# Patient Record
Sex: FEMALE | Race: WHITE | NOT HISPANIC OR LATINO | Employment: OTHER | URBAN - METROPOLITAN AREA
[De-identification: names, ages, dates, MRNs, and addresses within clinical notes are randomized per-mention and may not be internally consistent; named-entity substitution may affect disease eponyms.]

---

## 2017-01-24 RX ORDER — AMLODIPINE BESYLATE 10 MG/1
TABLET ORAL
Qty: 30 TABLET | Refills: 0 | Status: SHIPPED | OUTPATIENT
Start: 2017-01-24 | End: 2017-02-20 | Stop reason: SDUPTHER

## 2017-01-24 RX ORDER — LISINOPRIL 40 MG/1
TABLET ORAL
Qty: 30 TABLET | Refills: 0 | Status: SHIPPED | OUTPATIENT
Start: 2017-01-24 | End: 2017-02-20 | Stop reason: SDUPTHER

## 2017-02-20 RX ORDER — LISINOPRIL 40 MG/1
TABLET ORAL
Qty: 30 TABLET | Refills: 0 | Status: SHIPPED | OUTPATIENT
Start: 2017-02-20 | End: 2017-03-20 | Stop reason: SDUPTHER

## 2017-02-20 RX ORDER — AMLODIPINE BESYLATE 10 MG/1
TABLET ORAL
Qty: 30 TABLET | Refills: 0 | Status: SHIPPED | OUTPATIENT
Start: 2017-02-20 | End: 2017-03-07

## 2017-03-07 ENCOUNTER — OFFICE VISIT (OUTPATIENT)
Dept: FAMILY MEDICINE CLINIC | Facility: CLINIC | Age: 67
End: 2017-03-07

## 2017-03-07 VITALS
RESPIRATION RATE: 16 BRPM | DIASTOLIC BLOOD PRESSURE: 80 MMHG | HEART RATE: 85 BPM | SYSTOLIC BLOOD PRESSURE: 124 MMHG | TEMPERATURE: 97.7 F | BODY MASS INDEX: 28.93 KG/M2 | OXYGEN SATURATION: 98 % | WEIGHT: 180 LBS | HEIGHT: 66 IN

## 2017-03-07 DIAGNOSIS — R39.9 UTI SYMPTOMS: Primary | ICD-10-CM

## 2017-03-07 DIAGNOSIS — R10.9 ACUTE RIGHT FLANK PAIN: ICD-10-CM

## 2017-03-07 LAB
BILIRUB BLD-MCNC: NEGATIVE MG/DL
CLARITY, POC: CLEAR
COLOR UR: ABNORMAL
GLUCOSE UR STRIP-MCNC: NEGATIVE MG/DL
KETONES UR QL: NEGATIVE
LEUKOCYTE EST, POC: ABNORMAL
NITRITE UR-MCNC: NEGATIVE MG/ML
PH UR: 6 [PH] (ref 5–8)
PROT UR STRIP-MCNC: NEGATIVE MG/DL
RBC # UR STRIP: ABNORMAL /UL
SP GR UR: 1.02 (ref 1–1.03)
UROBILINOGEN UR QL: NORMAL

## 2017-03-07 PROCEDURE — 81003 URINALYSIS AUTO W/O SCOPE: CPT | Performed by: PHYSICIAN ASSISTANT

## 2017-03-07 PROCEDURE — 99213 OFFICE O/P EST LOW 20 MIN: CPT | Performed by: PHYSICIAN ASSISTANT

## 2017-03-07 RX ORDER — AMOXICILLIN AND CLAVULANATE POTASSIUM 875; 125 MG/1; MG/1
1 TABLET, FILM COATED ORAL 2 TIMES DAILY
Qty: 20 TABLET | Refills: 0 | Status: SHIPPED | OUTPATIENT
Start: 2017-03-07 | End: 2017-12-22

## 2017-03-07 NOTE — PROGRESS NOTES
Subjective   Leonard Best is a 66 y.o. female.     History of Present Illness   Leonard Best 66 y.o.female complains of urinary symptoms. she complains of back pain. She has had symptoms for 10 days. The symptoms are mild.  Patient denies fever, gross blood in urine, urgency, frequency, dysuria, nausea, vomiting and abdominal pain.  Patient has tried  Macrobid for relief of symptoms.  Patient does not have a history of recurrent UTI except when pregnant. Patient does not have a history of pyelonephritis.  She is improved but still there  Has one kidney that is smaller     Went to [facility] and no cx  2-28-17 had + nitrates, leuk, neg blood      The following portions of the patient's history were reviewed and updated as appropriate: allergies, current medications, past family history, past medical history, past social history, past surgical history and problem list.    Review of Systems   Constitutional: Negative for activity change, appetite change and unexpected weight change.   HENT: Negative for nosebleeds and trouble swallowing.    Eyes: Negative for pain and visual disturbance.   Respiratory: Negative for chest tightness, shortness of breath and wheezing.    Cardiovascular: Negative for chest pain and palpitations.   Gastrointestinal: Negative for abdominal pain and blood in stool.   Endocrine: Negative.    Genitourinary: Negative for difficulty urinating and hematuria.   Musculoskeletal: Positive for back pain. Negative for joint swelling.   Skin: Negative for color change and rash.   Allergic/Immunologic: Negative.    Neurological: Negative for syncope and speech difficulty.   Hematological: Negative for adenopathy.   Psychiatric/Behavioral: Negative for agitation and confusion.   All other systems reviewed and are negative.      Objective   Physical Exam   Constitutional: She is oriented to person, place, and time. She appears well-developed and well-nourished. No distress.   HENT:   Head: Normocephalic and  atraumatic.   Eyes: Conjunctivae and EOM are normal.   Neck: Neck supple.   Pulmonary/Chest: Effort normal.   Abdominal: Soft. Bowel sounds are normal. She exhibits no distension and no mass. There is no tenderness. There is no rebound and no guarding.   Musculoskeletal: Normal range of motion. She exhibits tenderness (mildly just below right flank).   No ROM pain   Neurological: She is alert and oriented to person, place, and time. She has normal reflexes.   Skin: Skin is warm and dry. No rash noted. She is not diaphoretic.   Psychiatric: She has a normal mood and affect. Her behavior is normal. Judgment and thought content normal.   Nursing note and vitals reviewed.      Assessment/Plan   Problems Addressed this Visit     None      Visit Diagnoses     UTI symptoms    -  Primary    Relevant Medications    amoxicillin-clavulanate (AUGMENTIN) 875-125 MG per tablet    Other Relevant Orders    POC Urinalysis Dipstick, Automated (Completed)    Urine Culture    Urinalysis With Microscopic    Acute right flank pain

## 2017-03-07 NOTE — PATIENT INSTRUCTIONS
ER if fever; sepsis    Urinary Tract Infection  Urinary tract infections (UTIs) can develop anywhere along your urinary tract. Your urinary tract is your body's drainage system for removing wastes and extra water. Your urinary tract includes two kidneys, two ureters, a bladder, and a urethra. Your kidneys are a pair of bean-shaped organs. Each kidney is about the size of your fist. They are located below your ribs, one on each side of your spine.  CAUSES  Infections are caused by microbes, which are microscopic organisms, including fungi, viruses, and bacteria. These organisms are so small that they can only be seen through a microscope. Bacteria are the microbes that most commonly cause UTIs.  SYMPTOMS   Symptoms of UTIs may vary by age and gender of the patient and by the location of the infection. Symptoms in young women typically include a frequent and intense urge to urinate and a painful, burning feeling in the bladder or urethra during urination. Older women and men are more likely to be tired, shaky, and weak and have muscle aches and abdominal pain. A fever may mean the infection is in your kidneys. Other symptoms of a kidney infection include pain in your back or sides below the ribs, nausea, and vomiting.  DIAGNOSIS  To diagnose a UTI, your caregiver will ask you about your symptoms. Your caregiver will also ask you to provide a urine sample. The urine sample will be tested for bacteria and white blood cells. White blood cells are made by your body to help fight infection.  TREATMENT   Typically, UTIs can be treated with medication. Because most UTIs are caused by a bacterial infection, they usually can be treated with the use of antibiotics. The choice of antibiotic and length of treatment depend on your symptoms and the type of bacteria causing your infection.  HOME CARE INSTRUCTIONS  · If you were prescribed antibiotics, take them exactly as your caregiver instructs you. Finish the medication even if  you feel better after you have only taken some of the medication.  · Drink enough water and fluids to keep your urine clear or pale yellow.  · Avoid caffeine, tea, and carbonated beverages. They tend to irritate your bladder.  · Empty your bladder often. Avoid holding urine for long periods of time.  · Empty your bladder before and after sexual intercourse.  · After a bowel movement, women should cleanse from front to back. Use each tissue only once.  SEEK MEDICAL CARE IF:   · You have back pain.  · You develop a fever.  · Your symptoms do not begin to resolve within 3 days.  SEEK IMMEDIATE MEDICAL CARE IF:   · You have severe back pain or lower abdominal pain.  · You develop chills.  · You have nausea or vomiting.  · You have continued burning or discomfort with urination.  MAKE SURE YOU:   · Understand these instructions.  · Will watch your condition.  · Will get help right away if you are not doing well or get worse.     This information is not intended to replace advice given to you by your health care provider. Make sure you discuss any questions you have with your health care provider.     Document Released: 09/27/2006 Document Revised: 09/07/2016 Document Reviewed: 11/07/2016  [employer] Interactive Patient Education ©2016 [employer]    Considering urology consult

## 2017-03-10 LAB
APPEARANCE UR: (no result)
BACTERIA #/AREA URNS HPF: ABNORMAL /[HPF]
BACTERIA UR CULT: ABNORMAL
BACTERIA UR CULT: ABNORMAL
BILIRUB UR QL STRIP: NEGATIVE
COLOR UR: YELLOW
CRYSTALS URNS MICRO: ABNORMAL
EPI CELLS #/AREA URNS HPF: ABNORMAL /HPF
GLUCOSE UR QL: NEGATIVE
HGB UR QL STRIP: NEGATIVE
KETONES UR QL STRIP: NEGATIVE
LEUKOCYTE ESTERASE UR QL STRIP: (no result)
MICRO URNS: (no result)
MUCOUS THREADS URNS QL MICRO: PRESENT
NITRITE UR QL STRIP: NEGATIVE
OTHER ANTIBIOTIC SUSC ISLT: ABNORMAL
PH UR STRIP: 6 [PH] (ref 5–7.5)
PROT UR QL STRIP: NEGATIVE
RBC #/AREA URNS HPF: ABNORMAL /HPF
SP GR UR: 1.01 (ref 1–1.03)
UNIDENT CRYS URNS QL MICRO: PRESENT
UROBILINOGEN UR STRIP-MCNC: 0.2 MG/DL (ref 0.2–1)
WBC #/AREA URNS HPF: ABNORMAL /HPF

## 2017-03-20 RX ORDER — AMLODIPINE BESYLATE 10 MG/1
TABLET ORAL
Qty: 30 TABLET | Refills: 0 | Status: SHIPPED | OUTPATIENT
Start: 2017-03-20 | End: 2017-04-20 | Stop reason: SDUPTHER

## 2017-03-20 RX ORDER — LISINOPRIL 40 MG/1
TABLET ORAL
Qty: 30 TABLET | Refills: 0 | Status: SHIPPED | OUTPATIENT
Start: 2017-03-20 | End: 2017-04-20 | Stop reason: SDUPTHER

## 2017-04-20 RX ORDER — LISINOPRIL 40 MG/1
TABLET ORAL
Qty: 30 TABLET | Refills: 0 | Status: SHIPPED | OUTPATIENT
Start: 2017-04-20 | End: 2017-05-18 | Stop reason: SDUPTHER

## 2017-04-20 RX ORDER — AMLODIPINE BESYLATE 10 MG/1
TABLET ORAL
Qty: 30 TABLET | Refills: 0 | Status: SHIPPED | OUTPATIENT
Start: 2017-04-20 | End: 2017-05-18 | Stop reason: SDUPTHER

## 2017-05-18 RX ORDER — LISINOPRIL 40 MG/1
TABLET ORAL
Qty: 30 TABLET | Refills: 0 | Status: SHIPPED | OUTPATIENT
Start: 2017-05-18 | End: 2017-06-14 | Stop reason: SDUPTHER

## 2017-05-18 RX ORDER — AMLODIPINE BESYLATE 10 MG/1
TABLET ORAL
Qty: 30 TABLET | Refills: 0 | Status: SHIPPED | OUTPATIENT
Start: 2017-05-18 | End: 2017-06-14 | Stop reason: SDUPTHER

## 2017-06-14 RX ORDER — AMLODIPINE BESYLATE 10 MG/1
TABLET ORAL
Qty: 30 TABLET | Refills: 0 | Status: SHIPPED | OUTPATIENT
Start: 2017-06-14 | End: 2017-07-18 | Stop reason: SDUPTHER

## 2017-06-14 RX ORDER — LISINOPRIL 40 MG/1
TABLET ORAL
Qty: 30 TABLET | Refills: 0 | Status: SHIPPED | OUTPATIENT
Start: 2017-06-14 | End: 2017-07-18 | Stop reason: SDUPTHER

## 2017-07-18 RX ORDER — LISINOPRIL 40 MG/1
TABLET ORAL
Qty: 30 TABLET | Refills: 0 | Status: SHIPPED | OUTPATIENT
Start: 2017-07-18 | End: 2017-08-14 | Stop reason: SDUPTHER

## 2017-07-18 RX ORDER — AMLODIPINE BESYLATE 10 MG/1
TABLET ORAL
Qty: 30 TABLET | Refills: 0 | Status: SHIPPED | OUTPATIENT
Start: 2017-07-18 | End: 2017-08-14 | Stop reason: SDUPTHER

## 2017-08-14 RX ORDER — LISINOPRIL 40 MG/1
TABLET ORAL
Qty: 30 TABLET | Refills: 0 | Status: SHIPPED | OUTPATIENT
Start: 2017-08-14 | End: 2017-09-07 | Stop reason: SDUPTHER

## 2017-08-14 RX ORDER — AMLODIPINE BESYLATE 10 MG/1
TABLET ORAL
Qty: 30 TABLET | Refills: 0 | Status: SHIPPED | OUTPATIENT
Start: 2017-08-14 | End: 2017-09-07 | Stop reason: SDUPTHER

## 2017-09-07 RX ORDER — LISINOPRIL 40 MG/1
TABLET ORAL
Qty: 30 TABLET | Refills: 0 | Status: SHIPPED | OUTPATIENT
Start: 2017-09-07 | End: 2017-10-05 | Stop reason: SDUPTHER

## 2017-09-07 RX ORDER — AMLODIPINE BESYLATE 10 MG/1
TABLET ORAL
Qty: 30 TABLET | Refills: 0 | Status: SHIPPED | OUTPATIENT
Start: 2017-09-07 | End: 2017-10-05 | Stop reason: SDUPTHER

## 2017-10-05 RX ORDER — LISINOPRIL 40 MG/1
TABLET ORAL
Qty: 30 TABLET | Refills: 0 | Status: SHIPPED | OUTPATIENT
Start: 2017-10-05 | End: 2017-11-05 | Stop reason: SDUPTHER

## 2017-10-05 RX ORDER — AMLODIPINE BESYLATE 10 MG/1
TABLET ORAL
Qty: 30 TABLET | Refills: 0 | Status: SHIPPED | OUTPATIENT
Start: 2017-10-05 | End: 2017-11-05 | Stop reason: SDUPTHER

## 2017-11-05 RX ORDER — AMLODIPINE BESYLATE 10 MG/1
TABLET ORAL
Qty: 30 TABLET | Refills: 0 | Status: SHIPPED | OUTPATIENT
Start: 2017-11-05 | End: 2017-12-06 | Stop reason: SDUPTHER

## 2017-11-05 RX ORDER — LISINOPRIL 40 MG/1
TABLET ORAL
Qty: 30 TABLET | Refills: 0 | Status: SHIPPED | OUTPATIENT
Start: 2017-11-05 | End: 2017-12-06 | Stop reason: SDUPTHER

## 2017-12-06 RX ORDER — AMLODIPINE BESYLATE 10 MG/1
TABLET ORAL
Qty: 15 TABLET | Refills: 0 | Status: SHIPPED | OUTPATIENT
Start: 2017-12-06 | End: 2018-01-09 | Stop reason: ALTCHOICE

## 2017-12-06 RX ORDER — LISINOPRIL 40 MG/1
TABLET ORAL
Qty: 15 TABLET | Refills: 0 | Status: SHIPPED | OUTPATIENT
Start: 2017-12-06 | End: 2017-12-26 | Stop reason: SDUPTHER

## 2017-12-22 ENCOUNTER — OFFICE VISIT (OUTPATIENT)
Dept: FAMILY MEDICINE CLINIC | Facility: CLINIC | Age: 67
End: 2017-12-22

## 2017-12-22 ENCOUNTER — APPOINTMENT (OUTPATIENT)
Dept: GENERAL RADIOLOGY | Facility: HOSPITAL | Age: 67
End: 2017-12-22

## 2017-12-22 ENCOUNTER — HOSPITAL ENCOUNTER (EMERGENCY)
Facility: HOSPITAL | Age: 67
Discharge: HOME | End: 2017-12-22
Attending: FAMILY MEDICINE | Admitting: FAMILY MEDICINE

## 2017-12-22 VITALS
SYSTOLIC BLOOD PRESSURE: 130 MMHG | BODY MASS INDEX: 31.34 KG/M2 | HEART RATE: 76 BPM | OXYGEN SATURATION: 98 % | HEIGHT: 66 IN | WEIGHT: 195 LBS | RESPIRATION RATE: 18 BRPM | DIASTOLIC BLOOD PRESSURE: 75 MMHG | TEMPERATURE: 98.2 F

## 2017-12-22 VITALS
OXYGEN SATURATION: 95 % | WEIGHT: 184 LBS | HEIGHT: 66 IN | BODY MASS INDEX: 29.57 KG/M2 | HEART RATE: 156 BPM | DIASTOLIC BLOOD PRESSURE: 88 MMHG | RESPIRATION RATE: 16 BRPM | SYSTOLIC BLOOD PRESSURE: 110 MMHG | TEMPERATURE: 97.8 F

## 2017-12-22 DIAGNOSIS — I10 ESSENTIAL HYPERTENSION: ICD-10-CM

## 2017-12-22 DIAGNOSIS — E78.2 MIXED HYPERLIPIDEMIA: ICD-10-CM

## 2017-12-22 DIAGNOSIS — I47.1 SVT (SUPRAVENTRICULAR TACHYCARDIA) (HCC): Primary | ICD-10-CM

## 2017-12-22 DIAGNOSIS — I47.1 SUPRAVENTRICULAR TACHYCARDIA (HCC): Primary | ICD-10-CM

## 2017-12-22 LAB
ALBUMIN SERPL-MCNC: 3.8 G/DL (ref 3.5–5.2)
ALBUMIN/GLOB SERPL: 1.1 G/DL
ALP SERPL-CCNC: 127 U/L (ref 39–117)
ALT SERPL W P-5'-P-CCNC: 16 U/L (ref 1–33)
ANION GAP SERPL CALCULATED.3IONS-SCNC: 13.6 MMOL/L
AST SERPL-CCNC: 20 U/L (ref 1–32)
BASOPHILS # BLD AUTO: 0.1 10*3/MM3 (ref 0–0.2)
BASOPHILS NFR BLD AUTO: 1.2 % (ref 0–1.5)
BILIRUB SERPL-MCNC: 0.3 MG/DL (ref 0.1–1.2)
BUN BLD-MCNC: 15 MG/DL (ref 8–23)
BUN/CREAT SERPL: 18.1 (ref 7–25)
CALCIUM SPEC-SCNC: 10.2 MG/DL (ref 8.6–10.5)
CHLORIDE SERPL-SCNC: 103 MMOL/L (ref 98–107)
CO2 SERPL-SCNC: 22.4 MMOL/L (ref 22–29)
CREAT BLD-MCNC: 0.83 MG/DL (ref 0.57–1)
DEPRECATED RDW RBC AUTO: 47 FL (ref 37–54)
EOSINOPHIL # BLD AUTO: 0.48 10*3/MM3 (ref 0–0.7)
EOSINOPHIL NFR BLD AUTO: 5.8 % (ref 0.3–6.2)
ERYTHROCYTE [DISTWIDTH] IN BLOOD BY AUTOMATED COUNT: 14 % (ref 11.7–13)
GFR SERPL CREATININE-BSD FRML MDRD: 69 ML/MIN/1.73
GLOBULIN UR ELPH-MCNC: 3.6 GM/DL
GLUCOSE BLD-MCNC: 101 MG/DL (ref 65–99)
HCT VFR BLD AUTO: 41.5 % (ref 35.6–45.5)
HGB BLD-MCNC: 13.3 G/DL (ref 11.9–15.5)
HOLD SPECIMEN: NORMAL
HOLD SPECIMEN: NORMAL
IMM GRANULOCYTES # BLD: 0.02 10*3/MM3 (ref 0–0.03)
IMM GRANULOCYTES NFR BLD: 0.2 % (ref 0–0.5)
LYMPHOCYTES # BLD AUTO: 2.35 10*3/MM3 (ref 0.9–4.8)
LYMPHOCYTES NFR BLD AUTO: 28.3 % (ref 19.6–45.3)
MAGNESIUM SERPL-MCNC: 2 MG/DL (ref 1.6–2.4)
MCH RBC QN AUTO: 29.4 PG (ref 26.9–32)
MCHC RBC AUTO-ENTMCNC: 32 G/DL (ref 32.4–36.3)
MCV RBC AUTO: 91.6 FL (ref 80.5–98.2)
MONOCYTES # BLD AUTO: 0.49 10*3/MM3 (ref 0.2–1.2)
MONOCYTES NFR BLD AUTO: 5.9 % (ref 5–12)
NEUTROPHILS # BLD AUTO: 4.86 10*3/MM3 (ref 1.9–8.1)
NEUTROPHILS NFR BLD AUTO: 58.6 % (ref 42.7–76)
PLATELET # BLD AUTO: 298 10*3/MM3 (ref 140–500)
PMV BLD AUTO: 10.5 FL (ref 6–12)
POTASSIUM BLD-SCNC: 4.2 MMOL/L (ref 3.5–5.2)
PROT SERPL-MCNC: 7.4 G/DL (ref 6–8.5)
RBC # BLD AUTO: 4.53 10*6/MM3 (ref 3.9–5.2)
SODIUM BLD-SCNC: 139 MMOL/L (ref 136–145)
TROPONIN T SERPL-MCNC: <0.01 NG/ML (ref 0–0.03)
TSH SERPL DL<=0.05 MIU/L-ACNC: 0.64 MIU/ML (ref 0.27–4.2)
WBC NRBC COR # BLD: 8.3 10*3/MM3 (ref 4.5–10.7)
WHOLE BLOOD HOLD SPECIMEN: NORMAL
WHOLE BLOOD HOLD SPECIMEN: NORMAL

## 2017-12-22 PROCEDURE — 84484 ASSAY OF TROPONIN QUANT: CPT | Performed by: FAMILY MEDICINE

## 2017-12-22 PROCEDURE — 83735 ASSAY OF MAGNESIUM: CPT | Performed by: FAMILY MEDICINE

## 2017-12-22 PROCEDURE — 84443 ASSAY THYROID STIM HORMONE: CPT | Performed by: FAMILY MEDICINE

## 2017-12-22 PROCEDURE — 93005 ELECTROCARDIOGRAM TRACING: CPT | Performed by: FAMILY MEDICINE

## 2017-12-22 PROCEDURE — 93000 ELECTROCARDIOGRAM COMPLETE: CPT | Performed by: PHYSICIAN ASSISTANT

## 2017-12-22 PROCEDURE — 99214 OFFICE O/P EST MOD 30 MIN: CPT | Performed by: PHYSICIAN ASSISTANT

## 2017-12-22 PROCEDURE — 93010 ELECTROCARDIOGRAM REPORT: CPT | Performed by: INTERNAL MEDICINE

## 2017-12-22 PROCEDURE — 99284 EMERGENCY DEPT VISIT MOD MDM: CPT

## 2017-12-22 PROCEDURE — 85025 COMPLETE CBC W/AUTO DIFF WBC: CPT | Performed by: FAMILY MEDICINE

## 2017-12-22 PROCEDURE — 80053 COMPREHEN METABOLIC PANEL: CPT | Performed by: FAMILY MEDICINE

## 2017-12-22 PROCEDURE — 71020 HC CHEST PA AND LATERAL: CPT

## 2017-12-22 RX ORDER — METOPROLOL SUCCINATE 25 MG/1
25 TABLET, EXTENDED RELEASE ORAL DAILY
Qty: 30 TABLET | Refills: 0 | Status: SHIPPED | OUTPATIENT
Start: 2017-12-22 | End: 2018-01-09 | Stop reason: SDUPTHER

## 2017-12-22 NOTE — PROGRESS NOTES
Procedure     ECG 12 Lead  Date/Time: 12/22/2017 10:03 AM  Performed by: NICOLE WAYNE  Authorized by: NICOLE WAYNE   Rhythm: ventricular tachycardia  Rate: tachycardic  Conduction: conduction normal  T Waves: T waves normal  QRS axis: normal  Clinical impression: abnormal ECG  Comments: EKG Interpretation Report    Heart rate:    150 beats/min, MA interval:  unable msec, QRS duration:  88 msec  QTu:288 msec, QTc:  455 msec

## 2017-12-22 NOTE — ED PROVIDER NOTES
" EMERGENCY DEPARTMENT ENCOUNTER    CHIEF COMPLAINT  Chief Complaint: Tachycardia  History given by: Pt  History limited by: Nothing  Room Number: 14/14  PMD: Felicia Gloria PA-C      HPI:  Pt is a 67 y.o. female with a hx of hypertension, vitamin D deficiency, and hyperlipidemia who presents complaining of tachycardia (155) onset PTA. She reports she was at her PCP's for a BP medication follow up PTA who suggested she report to the ED. She denies CP, SOA, dizziness, or any other symptoms at this time. She reports she has experienced similar episodes previously. She states her episodes like this usually resolve. She denies significant cardiac hx.    Duration:  PTA  Onset: sudden  Timing: constant  Location: heart  Radiation: none  Quality: \"155\"  Intensity/Severity: moderate  Progression: unchanged  Associated Symptoms: none  Aggravating Factors: none  Alleviating Factors: none  Previous Episodes: none  Treatment before arrival: Pt received no treatment PTA.    PAST MEDICAL HISTORY  Active Ambulatory Problems     Diagnosis Date Noted   • Hypertension 12/30/2015   • Right knee pain 12/30/2015   • Hyperlipidemia 07/11/2016   • Osteoarthritis, multiple sites 08/18/2010   • Primary osteoarthritis of right knee 08/15/2016     Resolved Ambulatory Problems     Diagnosis Date Noted   • No Resolved Ambulatory Problems     Past Medical History:   Diagnosis Date   • History of indigestion    • History of staph infection 1980   • History of transfusion    • Hyperlipidemia    • Hypertension    • Osteoarthritis of knee    • Osteoarthritis, multiple sites 08/18/2010   • Vitamin D deficiency        PAST SURGICAL HISTORY  Past Surgical History:   Procedure Laterality Date   • FRACTURE SURGERY      left wrist   • LAPAROSCOPIC TUBAL LIGATION     • VT TOTAL KNEE ARTHROPLASTY Right 8/15/2016    Procedure: RT TOTAL KNEE ARTHROPLASTY;  Surgeon: Marlon Wills MD;  Location: [facility];  Service: Orthopedics   • THYROID SURGERY  1986    " thyroid nodule removed--removed about a third of her thyroid   • TONSILLECTOMY     • WRIST SURGERY Left 03/2003       FAMILY HISTORY  Family History   Problem Relation Age of Onset   • Cirrhosis Father        SOCIAL HISTORY  Social History     Social History   • Marital status:      Spouse name: N/A   • Number of children: N/A   • Years of education: N/A     Occupational History   • Not on file.     Social History Main Topics   • Smoking status: Former Smoker     Packs/day: 1.00     Years: 20.00     Types: Cigarettes     Quit date: 03/2015   • Smokeless tobacco: Never Used   • Alcohol use 0.6 oz/week     1 Glasses of wine per week      Comment: OCCASIONAL   • Drug use: No   • Sexual activity: Defer     Other Topics Concern   • Not on file     Social History Narrative       ALLERGIES  Sulfa antibiotics; Ciprofloxacin; Levofloxacin; and Macrodantin [nitrofurantoin]    REVIEW OF SYSTEMS  Review of Systems   Constitutional: Negative for fever.   HENT: Negative for sore throat.    Eyes: Negative.    Respiratory: Negative for cough and shortness of breath.    Cardiovascular: Negative for chest pain.        Pt complains of tachycardia.   Gastrointestinal: Negative for abdominal pain, diarrhea and vomiting.   Genitourinary: Negative for dysuria.   Musculoskeletal: Negative for neck pain.   Skin: Negative for rash.   Allergic/Immunologic: Negative.    Neurological: Negative for weakness, numbness and headaches.   Hematological: Negative.    Psychiatric/Behavioral: Negative.    All other systems reviewed and are negative.      PHYSICAL EXAM  ED Triage Vitals   Temp Pulse Resp BP SpO2   -- -- -- -- --             Temp src Heart Rate Source Patient Position BP Location FiO2 (%)   -- -- -- -- --              Physical Exam   Constitutional: She is oriented to person, place, and time and well-developed, well-nourished, and in no distress. No distress.   HENT:   Head: Normocephalic and atraumatic.   Eyes: EOM are normal.  Pupils are equal, round, and reactive to light.   Neck: Normal range of motion. Neck supple.   Cardiovascular: Normal rate, regular rhythm and normal heart sounds.    Pulmonary/Chest: Effort normal and breath sounds normal. No respiratory distress.   Abdominal: Soft. There is no tenderness. There is no rebound and no guarding.   Musculoskeletal: Normal range of motion. She exhibits no edema.   Neurological: She is alert and oriented to person, place, and time. She has normal sensation and normal strength.   Skin: Skin is warm and dry. No rash noted.   Psychiatric: Mood and affect normal.   Nursing note and vitals reviewed.      LAB RESULTS  Lab Results (last 24 hours)     Procedure Component Value Units Date/Time    CBC & Differential [[id number]] Collected:  12/22/17 1226    Specimen:  Blood Updated:  12/22/17 1243    Narrative:       The following orders were created for panel order CBC & Differential.  Procedure                               Abnormality         Status                     ---------                               -----------         ------                     CBC Auto Differential[[id number]]        Abnormal            Final result                 Please view results for these tests on the individual orders.    Comprehensive Metabolic Panel [[id number]]  (Abnormal) Collected:  12/22/17 1226    Specimen:  Blood Updated:  12/22/17 1308     Glucose 101 (H) mg/dL      BUN 15 mg/dL      Creatinine 0.83 mg/dL      Sodium 139 mmol/L      Potassium 4.2 mmol/L      Chloride 103 mmol/L      CO2 22.4 mmol/L      Calcium 10.2 mg/dL      Total Protein 7.4 g/dL      Albumin 3.80 g/dL      ALT (SGPT) 16 U/L      AST (SGOT) 20 U/L      Alkaline Phosphatase 127 (H) U/L      Total Bilirubin 0.3 mg/dL      eGFR Non African Amer 69 mL/min/1.73      Globulin 3.6 gm/dL      A/G Ratio 1.1 g/dL      BUN/Creatinine Ratio 18.1     Anion Gap 13.6 mmol/L     Troponin [[id number]]  (Normal) Collected:  12/22/17 1226    Specimen:   Blood Updated:  12/22/17 1316     Troponin T <0.010 ng/mL     Narrative:       Troponin T Reference Ranges:  Less than 0.03 ng/mL:    Negative for AMI  0.03 to 0.09 ng/mL:      Indeterminant for AMI  Greater than 0.09 ng/mL: Positive for AMI    TSH [[id number]]  (Normal) Collected:  12/22/17 1226    Specimen:  Blood Updated:  12/22/17 1316     TSH 0.644 mIU/mL     Magnesium [[id number]]  (Normal) Collected:  12/22/17 1226    Specimen:  Blood Updated:  12/22/17 1308     Magnesium 2.0 mg/dL     CBC Auto Differential [[id number]]  (Abnormal) Collected:  12/22/17 1226    Specimen:  Blood Updated:  12/22/17 1243     WBC 8.30 10*3/mm3      RBC 4.53 10*6/mm3      Hemoglobin 13.3 g/dL      Hematocrit 41.5 %      MCV 91.6 fL      MCH 29.4 pg      MCHC 32.0 (L) g/dL      RDW 14.0 (H) %      RDW-SD 47.0 fl      MPV 10.5 fL      Platelets 298 10*3/mm3      Neutrophil % 58.6 %      Lymphocyte % 28.3 %      Monocyte % 5.9 %      Eosinophil % 5.8 %      Basophil % 1.2 %      Immature Grans % 0.2 %      Neutrophils, Absolute 4.86 10*3/mm3      Lymphocytes, Absolute 2.35 10*3/mm3      Monocytes, Absolute 0.49 10*3/mm3      Eosinophils, Absolute 0.48 10*3/mm3      Basophils, Absolute 0.10 10*3/mm3      Immature Grans, Absolute 0.02 10*3/mm3           I ordered the above labs and reviewed the results    RADIOLOGY  XR Chest 2 View   Final Result   No focal pulmonary consolidation. Follow-up as clinical   indications persist.       This report was finalized on 12/22/2017 1:11 PM by Dr. Greyson Gayle MD.               I ordered the above noted radiological studies. Interpreted by radiologist. Reviewed by me in PACS.       PROCEDURES  Procedures    EKG           EKG time: 1220  Rhythm/Rate: SVT, 152  P waves and KS: nml  QRS, axis: Q waves in lead 3   ST and T waves: Nonspecific ST Changes     Interpreted Contemporaneously by me, independently viewed  New rhythm compared to prior 6/14/2016    EKG           EKG time: 1226  Rhythm/Rate:  NSR. 88  P waves and OK: nml  QRS, axis: nml   ST and T waves: Nonspecific ST changes     Interpreted Contemporaneously by me, independently viewed  Unchanged compared to prior 6/14/2016      PROGRESS AND CONSULTS  ED Course     1233 Ordered CXR, TSH, CMP, Troponin, and Magnesium for further evaluation    1330 BP- 135/83 HR- 93 Temp- 98.2 °F (36.8 °C) (Oral) O2 sat- 97%. Rechecked the patient who is in NAD and is resting comfortably. Informed pt of unremarkable lab and imaging results including WBC, TSH, CXR, Troponin, and Magnesium.  She remains asymptomatic. Suggested pt follow up with PCP and Cardiology. Will discharge with metoprolol to replace amlodipine. Pt understands and agrees with the plan. All questions answered.    MEDICAL DECISION MAKING  Results were reviewed/discussed with the patient and they were also made aware of online access. Pt also made aware that some labs, such as cultures, will not be resulted during ER visit and follow up with PMD is necessary.     MDM  Number of Diagnoses or Management Options     Amount and/or Complexity of Data Reviewed  Clinical lab tests: ordered and reviewed (WBC - 8.30, TSH - .644, Magnesium - 2.0, Troponin - <.010)  Tests in the radiology section of CPT®: reviewed and ordered (CXR shows NAD.)  Tests in the medicine section of CPT®: ordered and reviewed (See EKG procedure note.)  Decide to obtain previous medical records or to obtain history from someone other than the patient: yes  Review and summarize past medical records: yes  Independent visualization of images, tracings, or specimens: yes    Patient Progress  Patient progress: stable         DIAGNOSIS  Final diagnoses:   SVT (supraventricular tachycardia)       DISPOSITION  DISCHARGE    Patient discharged in stable condition.    Reviewed implications of results, diagnosis, meds, responsibility to follow up, warning signs and symptoms of possible worsening, potential complications and reasons to return to  ER.    Patient/Family voiced understanding of above instructions.    Discussed plan for discharge, as there is no emergent indication for admission.  Pt/family is agreeable and understands need for follow up and repeat testing.  Pt is aware that discharge does not mean that nothing is wrong but it indicates no emergency is present that requires admission and they must continue care with follow-up as given below or physician of their choice.     FOLLOW-UP  Mackenzie Abreu MD  [address] [facility] [address]  [address]  [place] [address]  [phone]          Felicia Gloria PA-C  [address] [facility]  [id number]  [facility] [address]  [phone]      Call your MD for a recheck next week of your blood pressure.  We may need to adjust what you are on.         Medication List      New Prescriptions          metoprolol succinate XL 25 MG 24 hr tablet   Commonly known as:  TOPROL-XL   Take 1 tablet by mouth Daily.         Stop          amLODIPine 10 MG tablet   Commonly known as:  NORVASC               Latest Documented Vital Signs:  As of 1:29 PM  BP- 135/83 HR- 93 Temp- 98.2 °F (36.8 °C) (Oral) O2 sat- 97%    --  Documentation assistance provided by haven Trivedi for Dr. Mosqueda.  Information recorded by the scribe was done at my direction and has been verified and validated by me.     Raoul Trivedi  12/22/17 [id number]       Cheko Mosqueda MD  12/22/17 [id number]

## 2017-12-22 NOTE — PROGRESS NOTES
"Subjective   Leonard Best is a 67 y.o. female.     History of Present Illness   Leonard Best 67 y.o. female who presents today for med refill and heart rate is over 150 bpm.  She feels \"like her insides are shaking\".  She woke up this way and has had it in past.  Did EKG and is in SVT. I personally discussed this patient's care with Dr. Garcia.  We reviewed the patient history, exam findings, and plan.  He did approve this plan of care.    Send out by EMS now  EMS here and patient on way  NAD and no SOA    she has a history of   Patient Active Problem List   Diagnosis   • Hypertension   • Right knee pain   • Hyperlipidemia   • Osteoarthritis, multiple sites   • Primary osteoarthritis of right knee   .        The following portions of the patient's history were reviewed and updated as appropriate: allergies, current medications, past family history, past medical history, past social history, past surgical history and problem list.    Review of Systems   Constitutional: Negative for activity change, appetite change and unexpected weight change.   HENT: Negative for nosebleeds and trouble swallowing.    Eyes: Negative for pain and visual disturbance.   Respiratory: Negative for chest tightness, shortness of breath and wheezing.    Cardiovascular: Positive for palpitations. Negative for chest pain.   Gastrointestinal: Negative for abdominal pain and blood in stool.   Endocrine: Negative.    Genitourinary: Negative for difficulty urinating and hematuria.   Musculoskeletal: Positive for arthralgias. Negative for joint swelling.   Skin: Negative for color change and rash.   Allergic/Immunologic: Negative.    Neurological: Negative for syncope and speech difficulty.   Hematological: Negative for adenopathy.   Psychiatric/Behavioral: Negative for agitation and confusion.   All other systems reviewed and are negative.      Objective   Physical Exam   Constitutional: She is oriented to person, place, and time. She " appears well-developed and well-nourished. No distress (no SOA).   HENT:   Head: Normocephalic and atraumatic.   Eyes: Conjunctivae and EOM are normal. Pupils are equal, round, and reactive to light. Right eye exhibits no discharge. Left eye exhibits no discharge. No scleral icterus.   Neck: Normal range of motion. Neck supple. No tracheal deviation present. No thyromegaly present.   Cardiovascular: Regular rhythm, intact distal pulses and normal pulses.  Exam reveals gallop.    No murmur heard.  Very fast heart rate but regular   Pulmonary/Chest: Effort normal and breath sounds normal. No respiratory distress. She has no wheezes. She has no rales.   Musculoskeletal: Normal range of motion.   Neurological: She is alert and oriented to person, place, and time. She exhibits normal muscle tone. Coordination normal.   Skin: Skin is warm. No rash noted. No erythema. No pallor.   Psychiatric: She has a normal mood and affect. Her behavior is normal. Judgment and thought content normal.   Nursing note and vitals reviewed.      Assessment/Plan   Leonard was seen today for hypertension.    Diagnoses and all orders for this visit:    Supraventricular tachycardia  -     ECG 12 Lead

## 2017-12-26 RX ORDER — LISINOPRIL 40 MG/1
40 TABLET ORAL DAILY
Qty: 30 TABLET | Refills: 0 | Status: SHIPPED | OUTPATIENT
Start: 2017-12-26 | End: 2018-01-09 | Stop reason: SDUPTHER

## 2017-12-27 ENCOUNTER — TELEPHONE (OUTPATIENT)
Dept: SOCIAL WORK | Facility: HOSPITAL | Age: 67
End: 2017-12-27

## 2017-12-27 NOTE — TELEPHONE ENCOUNTER
F/u phone call; spoke w/patient who reports feeling better, no further episodes; filled RX and taking as prescribed; Scheduled a f/u appt next week w/cardiology. No further concerns at this time. Chaya Pereyra RN

## 2018-01-09 ENCOUNTER — OFFICE VISIT (OUTPATIENT)
Dept: FAMILY MEDICINE CLINIC | Facility: CLINIC | Age: 68
End: 2018-01-09

## 2018-01-09 VITALS
SYSTOLIC BLOOD PRESSURE: 130 MMHG | HEART RATE: 76 BPM | HEIGHT: 66 IN | TEMPERATURE: 98.9 F | OXYGEN SATURATION: 98 % | BODY MASS INDEX: 31.34 KG/M2 | DIASTOLIC BLOOD PRESSURE: 82 MMHG | RESPIRATION RATE: 16 BRPM | WEIGHT: 195 LBS

## 2018-01-09 DIAGNOSIS — Z09 HOSPITAL DISCHARGE FOLLOW-UP: Primary | ICD-10-CM

## 2018-01-09 DIAGNOSIS — I10 ESSENTIAL HYPERTENSION: ICD-10-CM

## 2018-01-09 DIAGNOSIS — I47.1 PAROXYSMAL SVT (SUPRAVENTRICULAR TACHYCARDIA) (HCC): ICD-10-CM

## 2018-01-09 DIAGNOSIS — Z12.31 ENCOUNTER FOR SCREENING MAMMOGRAM FOR BREAST CANCER: ICD-10-CM

## 2018-01-09 DIAGNOSIS — E78.2 MIXED HYPERLIPIDEMIA: ICD-10-CM

## 2018-01-09 PROBLEM — E55.9 VITAMIN D DEFICIENCY: Status: ACTIVE | Noted: 2018-01-09

## 2018-01-09 PROCEDURE — 99214 OFFICE O/P EST MOD 30 MIN: CPT | Performed by: PHYSICIAN ASSISTANT

## 2018-01-09 PROCEDURE — G0009 ADMIN PNEUMOCOCCAL VACCINE: HCPCS | Performed by: PHYSICIAN ASSISTANT

## 2018-01-09 PROCEDURE — 90732 PPSV23 VACC 2 YRS+ SUBQ/IM: CPT | Performed by: PHYSICIAN ASSISTANT

## 2018-01-09 RX ORDER — LISINOPRIL 40 MG/1
40 TABLET ORAL DAILY
Qty: 30 TABLET | Refills: 5 | Status: SHIPPED | OUTPATIENT
Start: 2018-01-09 | End: 2018-07-14 | Stop reason: SDUPTHER

## 2018-01-09 RX ORDER — METOPROLOL SUCCINATE 25 MG/1
25 TABLET, EXTENDED RELEASE ORAL DAILY
Qty: 30 TABLET | Refills: 5 | Status: SHIPPED | OUTPATIENT
Start: 2018-01-09 | End: 2018-07-14 | Stop reason: SDUPTHER

## 2018-01-09 NOTE — PROGRESS NOTES
Transitional Care Follow Up Visit  Subjective     Leonard Best is a 67 y.o. female who presents for a transitional care management visit.    Within 48 business hours after discharge our office contacted her via telephone to coordinate her care and needs.      I reviewed and discussed the details of that call along with the discharge summary, hospital problems, inpatient lab results, inpatient diagnostic studies, and consultation reports with Leonard.     Current outpatient and discharge medications have been reconciled for the patient.    No flowsheet data found.  Risk for Readmission (LACE) No Data Recorded    History of Present Illness   Course During Hospital Stay:  Sent by EMS from here to [facility] 12-22-17 for SVT  She had negative cardiac enzymes, normal TSH,   Lab Results   Component Value Date    GLUCOSE 101 (H) 12/22/2017    BUN 15 12/22/2017    CREATININE 0.83 12/22/2017    EGFRIFNONA 69 12/22/2017    BCR 18.1 12/22/2017    K 4.2 12/22/2017    CO2 22.4 12/22/2017    CALCIUM 10.2 12/22/2017    ALBUMIN 3.80 12/22/2017    LABIL2 1.1 12/22/2017    AST 20 12/22/2017    ALT 16 12/22/2017     I will need to update LFTs; and needs lipids; will show free T4  Her Amlodipine was changed to Toprol for rate control.  I need her to see Dr. Abreu for f/u.  No more symptomatic SVT     Had echo 9-7-15 at [facility] and EF 62%; grade I diastolic dysfunction    XR CHEST 2 VW-      HISTORY: Female who is 67 years-old,  supraventricular tachycardia      TECHNIQUE: Frontal and lateral views of the chest      COMPARISON: 06/14/2016      FINDINGS: Heart size is normal. Aorta is calcified. Pulmonary  vasculature is unremarkable. No focal pulmonary consolidation, pleural  effusion, or pneumothorax. Old granulomatous disease is seen. No acute  osseous process.      IMPRESSION:  No focal pulmonary consolidation. Follow-up as clinical  indications persist.  The following portions of the patient's history were reviewed and updated as appropriate:  allergies, current medications, past family history, past medical history, past social history, past surgical history and problem list.    Review of Systems   Constitutional: Negative for activity change, appetite change and unexpected weight change.   HENT: Positive for congestion. Negative for nosebleeds and trouble swallowing.    Eyes: Negative for pain and visual disturbance.   Respiratory: Negative for chest tightness, shortness of breath and wheezing.    Cardiovascular: Negative for chest pain and palpitations.   Gastrointestinal: Negative for abdominal pain and blood in stool.   Endocrine: Negative.    Genitourinary: Negative for difficulty urinating and hematuria.   Musculoskeletal: Negative for joint swelling.   Skin: Negative for color change and rash.   Allergic/Immunologic: Negative.    Neurological: Negative for syncope and speech difficulty.   Hematological: Negative for adenopathy.   Psychiatric/Behavioral: Negative for agitation and confusion.   All other systems reviewed and are negative.      Objective   Physical Exam   Constitutional: She is oriented to person, place, and time. She appears well-developed and well-nourished. No distress.   HENT:   Head: Normocephalic and atraumatic.   Eyes: Conjunctivae and EOM are normal. Pupils are equal, round, and reactive to light. Right eye exhibits no discharge. Left eye exhibits no discharge. No scleral icterus.   Neck: Normal range of motion. Neck supple. No tracheal deviation present. No thyromegaly present.   Cardiovascular: Normal rate, regular rhythm, normal heart sounds, intact distal pulses and normal pulses.  Exam reveals no gallop.    No murmur heard.  Pulmonary/Chest: Effort normal and breath sounds normal. No respiratory distress. She has no wheezes. She has no rales.   Musculoskeletal: Normal range of motion.   Lymphadenopathy:     She has no cervical adenopathy.   Neurological: She is alert and oriented to person, place, and time. She exhibits  normal muscle tone. Coordination normal.   Skin: Skin is warm. No rash noted. No erythema. No pallor.   Psychiatric: She has a normal mood and affect. Her behavior is normal. Judgment and thought content normal.   Nursing note and vitals reviewed.      Assessment/Plan   Problems Addressed this Visit        Cardiovascular and Mediastinum    Hypertension    Relevant Medications    metoprolol succinate XL (TOPROL-XL) 25 MG 24 hr tablet    lisinopril (PRINIVIL,ZESTRIL) 40 MG tablet    Other Relevant Orders    Comprehensive metabolic panel    Lipid panel    T4, Free    Vitamin D 25 Hydroxy    Hyperlipidemia    Relevant Orders    Comprehensive metabolic panel    Lipid panel    T4, Free    Vitamin D 25 Hydroxy      Other Visit Diagnoses     Hospital discharge follow-up    -  Primary    Relevant Orders    Comprehensive metabolic panel    Lipid panel    T4, Free    Vitamin D 25 Hydroxy    Encounter for screening mammogram for breast cancer        Relevant Orders    Mammo Screening Bilateral With CAD    Comprehensive metabolic panel    Lipid panel    T4, Free    Vitamin D 25 Hydroxy    Paroxysmal SVT (supraventricular tachycardia)        Relevant Medications    metoprolol succinate XL (TOPROL-XL) 25 MG 24 hr tablet    Other Relevant Orders    Ambulatory Referral to Cardiology    Comprehensive metabolic panel    Lipid panel    T4, Free    Vitamin D 25 Hydroxy

## 2018-01-09 NOTE — PATIENT INSTRUCTIONS
Low glycemic index diet  Exercise 30 minutes most days of the week  Make sure you get results on any labs or tests we ordered today  We discussed medications and how to take them as prescribed  Sleep 6-8 hours each night if possible  If you have not signed up for [employer], please activate your code ASAP from your After Visit Summary today    LDL goal <100  LDL goal if heart disease <70  HDL goal >60  Triglyceride goal <150  BP goal =<130/80  Fasting glucose <100

## 2018-02-06 ENCOUNTER — HOSPITAL ENCOUNTER (OUTPATIENT)
Dept: MAMMOGRAPHY | Facility: HOSPITAL | Age: 68
Discharge: HOME | End: 2018-02-06
Admitting: PHYSICIAN ASSISTANT

## 2018-02-06 DIAGNOSIS — Z12.31 ENCOUNTER FOR SCREENING MAMMOGRAM FOR BREAST CANCER: ICD-10-CM

## 2018-02-06 PROCEDURE — 77067 SCR MAMMO BI INCL CAD: CPT

## 2018-02-07 LAB
25(OH)D3+25(OH)D2 SERPL-MCNC: 26.3 NG/ML (ref 30–100)
ALBUMIN SERPL-MCNC: 4.6 G/DL (ref 3.5–5.2)
ALBUMIN/GLOB SERPL: 1.6 G/DL
ALP SERPL-CCNC: 130 U/L (ref 39–117)
ALT SERPL-CCNC: 10 U/L (ref 1–33)
AST SERPL-CCNC: 13 U/L (ref 1–32)
BILIRUB SERPL-MCNC: 0.4 MG/DL (ref 0.1–1.2)
BUN SERPL-MCNC: 23 MG/DL (ref 8–23)
BUN/CREAT SERPL: 28.4 (ref 7–25)
CALCIUM SERPL-MCNC: 9.7 MG/DL (ref 8.6–10.5)
CHLORIDE SERPL-SCNC: 101 MMOL/L (ref 98–107)
CHOLEST SERPL-MCNC: 346 MG/DL (ref 0–200)
CO2 SERPL-SCNC: 26.1 MMOL/L (ref 22–29)
CREAT SERPL-MCNC: 0.81 MG/DL (ref 0.57–1)
GFR SERPLBLD CREATININE-BSD FMLA CKD-EPI: 71 ML/MIN/1.73
GFR SERPLBLD CREATININE-BSD FMLA CKD-EPI: 86 ML/MIN/1.73
GLOBULIN SER CALC-MCNC: 2.8 GM/DL
GLUCOSE SERPL-MCNC: 89 MG/DL (ref 65–99)
HDLC SERPL-MCNC: 64 MG/DL (ref 40–60)
LDLC SERPL CALC-MCNC: 215 MG/DL (ref 0–100)
POTASSIUM SERPL-SCNC: 4.9 MMOL/L (ref 3.5–5.2)
PROT SERPL-MCNC: 7.4 G/DL (ref 6–8.5)
SODIUM SERPL-SCNC: 138 MMOL/L (ref 136–145)
T4 FREE SERPL-MCNC: 0.95 NG/DL (ref 0.93–1.7)
TRIGL SERPL-MCNC: 336 MG/DL (ref 0–150)
VLDLC SERPL CALC-MCNC: 67.2 MG/DL (ref 5–40)

## 2018-02-14 DIAGNOSIS — I10 ESSENTIAL HYPERTENSION: ICD-10-CM

## 2018-02-14 DIAGNOSIS — E78.2 MIXED HYPERLIPIDEMIA: Primary | ICD-10-CM

## 2018-02-14 RX ORDER — ROSUVASTATIN CALCIUM 20 MG/1
20 TABLET, COATED ORAL DAILY
Qty: 30 TABLET | Refills: 11 | Status: SHIPPED | OUTPATIENT
Start: 2018-02-14 | End: 2019-02-25 | Stop reason: SDUPTHER

## 2018-02-15 ENCOUNTER — OFFICE VISIT (OUTPATIENT)
Dept: CARDIOLOGY | Facility: CLINIC | Age: 68
End: 2018-02-15

## 2018-02-15 VITALS
HEART RATE: 75 BPM | DIASTOLIC BLOOD PRESSURE: 84 MMHG | BODY MASS INDEX: 31.66 KG/M2 | SYSTOLIC BLOOD PRESSURE: 136 MMHG | HEIGHT: 66 IN | WEIGHT: 197 LBS

## 2018-02-15 DIAGNOSIS — I47.1 PSVT (PAROXYSMAL SUPRAVENTRICULAR TACHYCARDIA) (HCC): Primary | ICD-10-CM

## 2018-02-15 DIAGNOSIS — I10 ESSENTIAL HYPERTENSION: ICD-10-CM

## 2018-02-15 PROBLEM — I47.10 PSVT (PAROXYSMAL SUPRAVENTRICULAR TACHYCARDIA): Status: ACTIVE | Noted: 2018-02-15

## 2018-02-15 PROCEDURE — 99204 OFFICE O/P NEW MOD 45 MIN: CPT | Performed by: INTERNAL MEDICINE

## 2018-02-15 PROCEDURE — 93000 ELECTROCARDIOGRAM COMPLETE: CPT | Performed by: INTERNAL MEDICINE

## 2018-02-15 NOTE — PROGRESS NOTES
Subjective:     Encounter Date:02/15/2018      Patient ID: Leonard Best is a 67 y.o. female.    Chief Complaint:SVT.  History of Present Illness    67-year-old female who presents today for evaluation of an SVT.  Patient said for many many years she's felt her heart race.  She can usually hold her breath and break the palpitations.  She said that she came in the other day to your office and was having a sensation of feeling jittery inside.  This is the typical sensation she has when her heart is racing.  By time she got to the emergency room her symptoms had resolved.  She was changed to a beta blocker and presents today for evaluation.  Patient had one brief episode several weeks ago but it did not last for prolonged period time.  EKG was read as atrial fibrillation however is very regular and her symptoms are more consistent with a possible AVNRT.    Review of Systems   All other systems reviewed and are negative.        ECG 12 Lead  Date/Time: 2/15/2018 2:51 PM  Performed by: DEJUAN BENITEZ  Authorized by: DEJUAN BENITEZ   Comparison: compared with previous ECG from 12/22/2017  Comparison to previous ECG: svt  Rhythm: sinus rhythm  Clinical impression: normal ECG               Objective:     Physical Exam   Constitutional: She is oriented to person, place, and time. She appears well-developed.   HENT:   Head: Normocephalic.   Eyes: Conjunctivae are normal.   Neck: Normal range of motion.   Cardiovascular: Normal rate, regular rhythm and normal heart sounds.    Pulmonary/Chest: Breath sounds normal.   Abdominal: Soft. Bowel sounds are normal.   Musculoskeletal: Normal range of motion. She exhibits no edema.   Neurological: She is alert and oriented to person, place, and time.   Skin: Skin is warm and dry.   Psychiatric: She has a normal mood and affect. Her behavior is normal.   Vitals reviewed.      Lab Review:       Assessment:          Diagnosis Plan   1. PSVT (paroxysmal supraventricular  tachycardia)            Plan:       1.  SVT. Review of EKG it very difficult to definitively tell what the rhythm is.  It is extremely regular and I do not think it represents atrial fibrillation.  Obviously could represent atrial flutter but again no signs of that and the fact that there is resolution with hold her breath it would be more consistent with a PSVT.  She usually does vagal maneuvers to break it.  At this point she is on a beta blocker doing relatively well.  I would continue this for now.  Her echo was unremarkable several years ago and she says this is been going on most of her life.  I did tell her the natural course is that it will occur more often and last longer.  One concern is she can always pass out or come close to it and therefore reviewed what to do if she goes into this fast heart rate.  I would continue beta blocker follow-up in one year sooner if she has any issues.  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  Hypertension blood pressures good  3.  Follow-up one year sooner if issues

## 2018-05-09 ENCOUNTER — RESULTS ENCOUNTER (OUTPATIENT)
Dept: FAMILY MEDICINE CLINIC | Facility: CLINIC | Age: 68
End: 2018-05-09

## 2018-05-09 DIAGNOSIS — I10 ESSENTIAL HYPERTENSION: ICD-10-CM

## 2018-05-09 DIAGNOSIS — E78.2 MIXED HYPERLIPIDEMIA: ICD-10-CM

## 2018-07-14 RX ORDER — LISINOPRIL 40 MG/1
TABLET ORAL
Qty: 30 TABLET | Refills: 0 | Status: SHIPPED | OUTPATIENT
Start: 2018-07-14 | End: 2018-08-14

## 2018-07-14 RX ORDER — METOPROLOL SUCCINATE 25 MG/1
TABLET, EXTENDED RELEASE ORAL
Qty: 30 TABLET | Refills: 0 | Status: SHIPPED | OUTPATIENT
Start: 2018-07-14 | End: 2018-08-14 | Stop reason: SDUPTHER

## 2018-08-14 RX ORDER — LISINOPRIL 40 MG/1
TABLET ORAL
Qty: 30 TABLET | Refills: 0 | Status: SHIPPED | OUTPATIENT
Start: 2018-08-14 | End: 2018-09-17 | Stop reason: SDUPTHER

## 2018-08-14 RX ORDER — METOPROLOL SUCCINATE 25 MG/1
TABLET, EXTENDED RELEASE ORAL
Qty: 30 TABLET | Refills: 0 | Status: SHIPPED | OUTPATIENT
Start: 2018-08-14 | End: 2018-09-17 | Stop reason: SDUPTHER

## 2018-09-17 RX ORDER — METOPROLOL SUCCINATE 25 MG/1
TABLET, EXTENDED RELEASE ORAL
Qty: 30 TABLET | Refills: 0 | Status: SHIPPED | OUTPATIENT
Start: 2018-09-17 | End: 2018-10-15 | Stop reason: SDUPTHER

## 2018-09-17 RX ORDER — LISINOPRIL 40 MG/1
TABLET ORAL
Qty: 30 TABLET | Refills: 0 | Status: SHIPPED | OUTPATIENT
Start: 2018-09-17 | End: 2018-10-15 | Stop reason: SDUPTHER

## 2018-10-15 RX ORDER — LISINOPRIL 40 MG/1
TABLET ORAL
Qty: 15 TABLET | Refills: 0 | Status: SHIPPED | OUTPATIENT
Start: 2018-10-15 | End: 2018-11-02 | Stop reason: SDUPTHER

## 2018-10-15 RX ORDER — METOPROLOL SUCCINATE 25 MG/1
TABLET, EXTENDED RELEASE ORAL
Qty: 15 TABLET | Refills: 0 | Status: SHIPPED | OUTPATIENT
Start: 2018-10-15 | End: 2018-11-02 | Stop reason: SDUPTHER

## 2018-11-02 ENCOUNTER — OFFICE VISIT (OUTPATIENT)
Dept: FAMILY MEDICINE CLINIC | Facility: CLINIC | Age: 68
End: 2018-11-02

## 2018-11-02 VITALS
WEIGHT: 195 LBS | DIASTOLIC BLOOD PRESSURE: 80 MMHG | BODY MASS INDEX: 31.34 KG/M2 | HEIGHT: 66 IN | RESPIRATION RATE: 16 BRPM | SYSTOLIC BLOOD PRESSURE: 132 MMHG | OXYGEN SATURATION: 96 % | TEMPERATURE: 98.4 F | HEART RATE: 76 BPM

## 2018-11-02 DIAGNOSIS — E55.9 VITAMIN D DEFICIENCY: ICD-10-CM

## 2018-11-02 DIAGNOSIS — I10 ESSENTIAL HYPERTENSION: Primary | ICD-10-CM

## 2018-11-02 DIAGNOSIS — M15.9 PRIMARY OSTEOARTHRITIS INVOLVING MULTIPLE JOINTS: ICD-10-CM

## 2018-11-02 DIAGNOSIS — E78.2 MIXED HYPERLIPIDEMIA: ICD-10-CM

## 2018-11-02 DIAGNOSIS — I47.1 PSVT (PAROXYSMAL SUPRAVENTRICULAR TACHYCARDIA) (HCC): ICD-10-CM

## 2018-11-02 PROCEDURE — 99213 OFFICE O/P EST LOW 20 MIN: CPT | Performed by: PHYSICIAN ASSISTANT

## 2018-11-02 PROCEDURE — G0439 PPPS, SUBSEQ VISIT: HCPCS | Performed by: PHYSICIAN ASSISTANT

## 2018-11-02 RX ORDER — METOPROLOL SUCCINATE 25 MG/1
25 TABLET, EXTENDED RELEASE ORAL DAILY
Qty: 30 TABLET | Refills: 5 | Status: SHIPPED | OUTPATIENT
Start: 2018-11-02 | End: 2019-05-02 | Stop reason: SDUPTHER

## 2018-11-02 RX ORDER — LISINOPRIL 40 MG/1
40 TABLET ORAL DAILY
Qty: 30 TABLET | Refills: 5 | Status: SHIPPED | OUTPATIENT
Start: 2018-11-02 | End: 2019-05-02 | Stop reason: SDUPTHER

## 2018-11-02 NOTE — PROGRESS NOTES
Subjective   Leonard Best is a 68 y.o. female.     History of Present Illness   Leonard Best 68 y.o. female who presents today for routine follow up check and medication refills.  she has a history of   Patient Active Problem List   Diagnosis   • Hypertension   • Right knee pain   • Hyperlipidemia   • Osteoarthritis, multiple sites   • Primary osteoarthritis of right knee   • Vitamin D deficiency   • PSVT (paroxysmal supraventricular tachycardia) (CMS/HCC)   .  Since the last visit, she has overall felt well.  She has Hypertenision and is well controlled on medication, Hyperlipidemia and here to discuss treatment and Vitamin D deficiency and will update labs to confirm level is at goal >30.  she has been compliant with current medications have reviewed them.  The patient had fatigue and weight gain from Toprol.  I did add Crestor and need f/u labs for this.  Has seen Cardio and does have SVT; f/u yearly    Results for orders placed or performed in visit on 01/09/18   Comprehensive metabolic panel   Result Value Ref Range    Glucose 89 65 - 99 mg/dL    BUN 23 8 - 23 mg/dL    Creatinine 0.81 0.57 - 1.00 mg/dL    eGFR Non African Am 71 >60 mL/min/1.73    eGFR African Am 86 >60 mL/min/1.73    BUN/Creatinine Ratio 28.4 (H) 7.0 - 25.0    Sodium 138 136 - 145 mmol/L    Potassium 4.9 3.5 - 5.2 mmol/L    Chloride 101 98 - 107 mmol/L    Total CO2 26.1 22.0 - 29.0 mmol/L    Calcium 9.7 8.6 - 10.5 mg/dL    Total Protein 7.4 6.0 - 8.5 g/dL    Albumin 4.60 3.50 - 5.20 g/dL    Globulin 2.8 gm/dL    A/G Ratio 1.6 g/dL    Total Bilirubin 0.4 0.1 - 1.2 mg/dL    Alkaline Phosphatase 130 (H) 39 - 117 U/L    AST (SGOT) 13 1 - 32 U/L    ALT (SGPT) 10 1 - 33 U/L   Lipid panel   Result Value Ref Range    Total Cholesterol 346 (H) 0 - 200 mg/dL    Triglycerides 336 (H) 0 - 150 mg/dL    HDL Cholesterol 64 (H) 40 - 60 mg/dL    VLDL Cholesterol 67.2 (H) 5 - 40 mg/dL    LDL Cholesterol  215 (H) 0 - 100 mg/dL   T4, Free   Result Value Ref  Range    Free T4 0.95 0.93 - 1.70 ng/dL   Vitamin D 25 Hydroxy   Result Value Ref Range    25 Hydroxy, Vitamin D 26.3 (L) 30.0 - 100.0 ng/mL     Can do low dose lung CT  Declines DEXA  She will do Cologuard  I will order labs for FEb  The following portions of the patient's history were reviewed and updated as appropriate: allergies, current medications, past family history, past medical history, past social history, past surgical history and problem list.    Review of Systems   Constitutional: Positive for fatigue and unexpected weight change. Negative for activity change and appetite change.   HENT: Negative for nosebleeds and trouble swallowing.    Eyes: Negative for pain and visual disturbance.   Respiratory: Negative for chest tightness, shortness of breath and wheezing.    Cardiovascular: Negative for chest pain and palpitations.   Gastrointestinal: Negative for abdominal pain and blood in stool.   Endocrine: Negative.    Genitourinary: Negative for difficulty urinating and hematuria.   Musculoskeletal: Positive for back pain. Negative for joint swelling.   Skin: Negative for color change and rash.   Allergic/Immunologic: Negative.    Neurological: Negative for syncope and speech difficulty.   Hematological: Negative for adenopathy.   Psychiatric/Behavioral: Negative for agitation and confusion.   All other systems reviewed and are negative.      Objective   Physical Exam   Constitutional: She is oriented to person, place, and time. She appears well-developed and well-nourished. No distress.   HENT:   Head: Normocephalic and atraumatic.   Eyes: Pupils are equal, round, and reactive to light. Conjunctivae and EOM are normal. Right eye exhibits no discharge. Left eye exhibits no discharge. No scleral icterus.   Neck: Normal range of motion. Neck supple. No tracheal deviation present. No thyromegaly present.   Cardiovascular: Normal rate, regular rhythm, normal heart sounds, intact distal pulses and normal  pulses.  Exam reveals no gallop.    No murmur heard.  Pulmonary/Chest: Effort normal and breath sounds normal. No respiratory distress. She has no wheezes. She has no rales.   Musculoskeletal: Normal range of motion.   Neurological: She is alert and oriented to person, place, and time. She exhibits normal muscle tone. Coordination normal.   Skin: Skin is warm. No rash noted. No erythema. No pallor.   Psychiatric: She has a normal mood and affect. Her behavior is normal. Judgment and thought content normal.   Nursing note and vitals reviewed.      Assessment/Plan   Leonard was seen today for hyperlipidemia.    Diagnoses and all orders for this visit:    Essential hypertension    Vitamin D deficiency    PSVT (paroxysmal supraventricular tachycardia) (CMS/[facility])    Primary osteoarthritis involving multiple joints    Mixed hyperlipidemia    Other orders  -     lisinopril (PRINIVIL,ZESTRIL) 40 MG tablet; Take 1 tablet by mouth Daily. For BP  -     metoprolol succinate XL (TOPROL-XL) 25 MG 24 hr tablet; Take 1 tablet by mouth Daily. For BP and heart

## 2018-11-02 NOTE — PATIENT INSTRUCTIONS
Low glycemic index diet  Exercise 30 minutes most days of the week  Make sure you get results on any labs or tests we ordered today  We discussed medications and how to take them as prescribed  Sleep 6-8 hours each night if possible  If you have not signed up for [employer], please activate your code ASAP from your After Visit Summary today    LDL goal <100  LDL goal if heart disease <70  HDL goal >60  Triglyceride goal <150  BP goal =<130/80  Fasting glucose <100            Exercising to Lose Weight  Exercising can help you to lose weight. In order to lose weight through exercise, you need to do vigorous-intensity exercise. You can tell that you are exercising with vigorous intensity if you are breathing very hard and fast and cannot hold a conversation while exercising.  Moderate-intensity exercise helps to maintain your current weight. You can tell that you are exercising at a moderate level if you have a higher heart rate and faster breathing, but you are still able to hold a conversation.  How often should I exercise?  Choose an activity that you enjoy and set realistic goals. Your health care provider can help you to make an activity plan that works for you. Exercise regularly as directed by your health care provider. This may include:  · Doing resistance training twice each week, such as:  ? Push-ups.  ? Sit-ups.  ? Lifting weights.  ? Using resistance bands.  · Doing a given intensity of exercise for a given amount of time. Choose from these options:  ? 150 minutes of moderate-intensity exercise every week.  ? 75 minutes of vigorous-intensity exercise every week.  ? A mix of moderate-intensity and vigorous-intensity exercise every week.    Children, pregnant women, people who are out of shape, people who are overweight, and older adults may need to consult a health care provider for individual recommendations. If you have any sort of medical condition, be sure to consult your health care provider before  starting a new exercise program.  What are some activities that can help me to lose weight?  · Walking at a rate of at least 4.5 miles an hour.  · Jogging or running at a rate of 5 miles per hour.  · Biking at a rate of at least 10 miles per hour.  · Lap swimming.  · Roller-skating or in-line skating.  · Cross-country skiing.  · Vigorous competitive sports, such as football, basketball, and soccer.  · Jumping rope.  · Aerobic dancing.  How can I be more active in my day-to-day activities?  · Use the stairs instead of the elevator.  · Take a walk during your lunch break.  · If you drive, park your car farther away from work or school.  · If you take public transportation, get off one stop early and walk the rest of the way.  · Make all of your phone calls while standing up and walking around.  · Get up, stretch, and walk around every 30 minutes throughout the day.  What guidelines should I follow while exercising?  · Do not exercise so much that you hurt yourself, feel dizzy, or get very short of breath.  · Consult your health care provider prior to starting a new exercise program.  · Wear comfortable clothes and shoes with good support.  · Drink plenty of water while you exercise to prevent dehydration or heat stroke. Body water is lost during exercise and must be replaced.  · Work out until you breathe faster and your heart beats faster.  This information is not intended to replace advice given to you by your health care provider. Make sure you discuss any questions you have with your health care provider.  Document Released: 01/20/2012 Document Revised: 05/25/2017 Document Reviewed: 05/21/2015  [employer]vier Interactive Patient Education © 2018 [employer]      Fall Prevention in the Home  Falls can cause injuries. They can happen to people of all ages. There are many things you can do to make your home safe and to help prevent falls.  What can I do on the outside of my home?  · Regularly fix the edges of walkways  and driveways and fix any cracks.  · Remove anything that might make you trip as you walk through a door, such as a raised step or threshold.  · Trim any bushes or trees on the path to your home.  · Use bright outdoor lighting.  · Clear any walking paths of anything that might make someone trip, such as rocks or tools.  · Regularly check to see if handrails are loose or broken. Make sure that both sides of any steps have handrails.  · Any raised decks and porches should have guardrails on the edges.  · Have any leaves, snow, or ice cleared regularly.  · Use sand or salt on walking paths during winter.  · Clean up any spills in your garage right away. This includes oil or grease spills.  What can I do in the bathroom?  · Use night lights.  · Install grab bars by the toilet and in the tub and shower. Do not use towel bars as grab bars.  · Use non-skid mats or decals in the tub or shower.  · If you need to sit down in the shower, use a plastic, non-slip stool.  · Keep the floor dry. Clean up any water that spills on the floor as soon as it happens.  · Remove soap buildup in the tub or shower regularly.  · Attach bath mats securely with double-sided non-slip rug tape.  · Do not have throw rugs and other things on the floor that can make you trip.  What can I do in the bedroom?  · Use night lights.  · Make sure that you have a light by your bed that is easy to reach.  · Do not use any sheets or blankets that are too big for your bed. They should not hang down onto the floor.  · Have a firm chair that has side arms. You can use this for support while you get dressed.  · Do not have throw rugs and other things on the floor that can make you trip.  What can I do in the kitchen?  · Clean up any spills right away.  · Avoid walking on wet floors.  · Keep items that you use a lot in easy-to-reach places.  · If you need to reach something above you, use a strong step stool that has a grab bar.  · Keep electrical cords out of the  way.  · Do not use floor polish or wax that makes floors slippery. If you must use wax, use non-skid floor wax.  · Do not have throw rugs and other things on the floor that can make you trip.  What can I do with my stairs?  · Do not leave any items on the stairs.  · Make sure that there are handrails on both sides of the stairs and use them. Fix handrails that are broken or loose. Make sure that handrails are as long as the stairways.  · Check any carpeting to make sure that it is firmly attached to the stairs. Fix any carpet that is loose or worn.  · Avoid having throw rugs at the top or bottom of the stairs. If you do have throw rugs, attach them to the floor with carpet tape.  · Make sure that you have a light switch at the top of the stairs and the bottom of the stairs. If you do not have them, ask someone to add them for you.  What else can I do to help prevent falls?  · Wear shoes that:  ? Do not have high heels.  ? Have rubber bottoms.  ? Are comfortable and fit you well.  ? Are closed at the toe. Do not wear sandals.  · If you use a stepladder:  ? Make sure that it is fully opened. Do not climb a closed stepladder.  ? Make sure that both sides of the stepladder are locked into place.  ? Ask someone to hold it for you, if possible.  · Clearly amol and make sure that you can see:  ? Any grab bars or handrails.  ? First and last steps.  ? Where the edge of each step is.  · Use tools that help you move around (mobility aids) if they are needed. These include:  ? Canes.  ? Walkers.  ? Scooters.  ? Crutches.  · Turn on the lights when you go into a dark area. Replace any light bulbs as soon as they burn out.  · Set up your furniture so you have a clear path. Avoid moving your furniture around.  · If any of your floors are uneven, fix them.  · If there are any pets around you, be aware of where they are.  · Review your medicines with your doctor. Some medicines can make you feel dizzy. This can increase your chance  of falling.  Ask your doctor what other things that you can do to help prevent falls.  This information is not intended to replace advice given to you by your health care provider. Make sure you discuss any questions you have with your health care provider.  Document Released: 10/14/2010 Document Revised: 05/25/2017 Document Reviewed: 01/22/2016  [employer] Interactive Patient Education © 2018 [employer]  Low glycemic index diet  Exercise 30 minutes most days of the week  Make sure you get results on any labs or tests we ordered today  We discussed medications and how to take them as prescribed  Sleep 6-8 hours each night if possible  If you have not signed up for [employer], please activate your code ASAP from your After Visit Summary today    LDL goal <100  LDL goal if heart disease <70  HDL goal >60  Triglyceride goal <150  BP goal =<130/80  Fasting glucose <100

## 2018-11-02 NOTE — PROGRESS NOTES
QUICK REFERENCE INFORMATION:  The ABCs of the Annual Wellness Visit    Subsequent Medicare Wellness Visit    HEALTH RISK ASSESSMENT    1950    Recent Hospitalizations:  No hospitalization(s) within the last year..        Current Medical Providers:  Patient Care Team:  Felicia Gloria PA-C as PCP - General  Felicia Gloria PA-C as PCP - Family Medicine  Felicia Gloria PA-C as PCP - Claims Attributed        Smoking Status:  History   Smoking Status   • Former Smoker   • Packs/day: 1.00   • Years: 20.00   • Types: Cigarettes   • Quit date: 03/2015   Smokeless Tobacco   • Never Used     Comment: CAFFEINE USE       Alcohol Consumption:  History   Alcohol Use   • 0.6 oz/week   • 1 Glasses of wine per week     Comment: OCCASIONAL       Depression Screen:   PHQ-2/PHQ-9 Depression Screening 11/2/2018   Little interest or pleasure in doing things 0   Feeling down, depressed, or hopeless -   Total Score 0       Health Habits and Functional and Cognitive Screening:  Functional & Cognitive Status 11/2/2018   Do you have difficulty preparing food and eating? No   Do you have difficulty bathing yourself, getting dressed or grooming yourself? No   Do you have difficulty using the toilet? No   Do you have difficulty moving around from place to place? No   Do you have trouble with steps or getting out of a bed or a chair? No   In the past year have you fallen or experienced a near fall? No   Current Diet Limited Junk Food   Dental Exam Up to date   Eye Exam Up to date   Exercise (times per week) 5 times per week   Current Exercise Activities Include Walking   Do you need help using the phone?  No   Are you deaf or do you have serious difficulty hearing?  No   Do you need help with transportation? No   Do you need help shopping? No   Do you need help preparing meals?  No   Do you need help with housework?  No   Do you need help with laundry? No   Do you need help taking your medications? No   Do you need help managing money? No    Do you ever drive or ride in a car without wearing a seat belt? No   Have you felt unusual stress, anger or loneliness in the last month? Yes   Who do you live with? Spouse   If you need help, do you have trouble finding someone available to you? No   Have you been bothered in the last four weeks by sexual problems? No   Do you have difficulty concentrating, remembering or making decisions? No           Does the patient have evidence of cognitive impairment? No    Aspirin use counseling: not on it      Recent Lab Results:  CMP:  Lab Results   Component Value Date    GLU 89 02/06/2018    BUN 23 02/06/2018    CREATININE 0.81 02/06/2018    EGFRIFNONA 71 02/06/2018    EGFRIFAFRI 86 02/06/2018    BCR 28.4 (H) 02/06/2018     02/06/2018    K 4.9 02/06/2018    CO2 26.1 02/06/2018    CALCIUM 9.7 02/06/2018    PROTENTOTREF 7.4 02/06/2018    ALBUMIN 4.60 02/06/2018    LABGLOBREF 2.8 02/06/2018    LABIL2 1.6 02/06/2018    BILITOT 0.4 02/06/2018    ALKPHOS 130 (H) 02/06/2018    AST 13 02/06/2018    ALT 10 02/06/2018     Lipid Panel:  Lab Results   Component Value Date    TRIG 336 (H) 02/06/2018    HDL 64 (H) 02/06/2018    VLDL 67.2 (H) 02/06/2018     HbA1c:       Visual Acuity:  No exam data present    Age-appropriate Screening Schedule:  Refer to the list below for future screening recommendations based on patient's age, sex and/or medical conditions. Orders for these recommended tests are listed in the plan section. The patient has been provided with a written plan.    Health Maintenance   Topic Date Due   • ZOSTER VACCINE (2 of 2) 09/05/2016   • DXA SCAN  07/11/2018   • MAMMOGRAM  02/06/2019   • LIPID PANEL  02/06/2019   • COLONOSCOPY  07/11/2026   • TDAP/TD VACCINES (2 - Td) 07/11/2026   • PNEUMOCOCCAL VACCINES (65+ LOW/MEDIUM RISK)  Completed   • INFLUENZA VACCINE  Excluded        Subjective   History of Present Illness    Leonard Best is a 68 y.o. female who presents for an Subsequent Wellness Visit.    The  "following portions of the patient's history were reviewed and updated as appropriate: allergies, current medications, past family history, past medical history, past social history, past surgical history and problem list.    Outpatient Medications Prior to Visit   Medication Sig Dispense Refill   • Calcium Carbonate-Vitamin D ([employer] CALCIUM/VIT D SOFT CHEWS PO) Take  by mouth.     • lisinopril (PRINIVIL,ZESTRIL) 40 MG tablet TAKE 1 TABLET BY MOUTH ONCE DAILY FOR BLOOD PRESSURE 15 tablet 0   • metoprolol succinate XL (TOPROL-XL) 25 MG 24 hr tablet TAKE 1 TABLET BY MOUTH ONCE DAILY FOR  BLOOD  PRESSURE  AND  HEART  RATE 15 tablet 0   • rosuvastatin (CRESTOR) 20 MG tablet Take 1 tablet by mouth Daily. For cholesterol 30 tablet 11     No facility-administered medications prior to visit.        Patient Active Problem List   Diagnosis   • Hypertension   • Right knee pain   • Hyperlipidemia   • Osteoarthritis, multiple sites   • Primary osteoarthritis of right knee   • Vitamin D deficiency   • PSVT (paroxysmal supraventricular tachycardia) (CMS/[facility])       Advance Care Planning:  has NO advance directive - information provided to the patient today    Identification of Risk Factors:  Risk factors include: weight  and caretaker stress.    Review of Systems    Compared to one year ago, the patient feels her physical health is the same.  Compared to one year ago, the patient feels her mental health is the same.    Objective     Physical Exam    Vitals:    11/02/18 1318   BP: 132/80   BP Location: Left arm   Patient Position: Sitting   Cuff Size: Large Adult   Pulse: 76   Resp: 16   Temp: 98.4 °F (36.9 °C)   TempSrc: Oral   SpO2: 96%   Weight: 88.5 kg (195 lb)   Height: 167.6 cm (66\")   PainSc: 0-No pain       Patient's Body mass index is 31.47 kg/m². BMI is above normal parameters. Recommendations include: exercise counseling.      Assessment/Plan   Patient Self-Management and Personalized Health Advice  The patient has been " provided with information about: exercise and designing advance directives and preventive services including:   · Advance directive, Nutrition counseling provided, Shingrix; DEXA declined; declines flu shot.    Visit Diagnoses:  No diagnosis found.    No orders of the defined types were placed in this encounter.      Outpatient Encounter Prescriptions as of 11/2/2018   Medication Sig Dispense Refill   • Calcium Carbonate-Vitamin D ([employer] CALCIUM/VIT D SOFT CHEWS PO) Take  by mouth.     • lisinopril (PRINIVIL,ZESTRIL) 40 MG tablet TAKE 1 TABLET BY MOUTH ONCE DAILY FOR BLOOD PRESSURE 15 tablet 0   • metoprolol succinate XL (TOPROL-XL) 25 MG 24 hr tablet TAKE 1 TABLET BY MOUTH ONCE DAILY FOR  BLOOD  PRESSURE  AND  HEART  RATE 15 tablet 0   • rosuvastatin (CRESTOR) 20 MG tablet Take 1 tablet by mouth Daily. For cholesterol 30 tablet 11     No facility-administered encounter medications on file as of 11/2/2018.        Reviewed use of high risk medication in the elderly: yes  Reviewed for potential of harmful drug interactions in the elderly: yes    Follow Up:  No Follow-up on file.     An After Visit Summary and PPPS with all of these plans were given to the patient.

## 2019-02-01 ENCOUNTER — RESULTS ENCOUNTER (OUTPATIENT)
Dept: FAMILY MEDICINE CLINIC | Facility: CLINIC | Age: 69
End: 2019-02-01

## 2019-02-01 DIAGNOSIS — E55.9 VITAMIN D DEFICIENCY: ICD-10-CM

## 2019-02-01 DIAGNOSIS — I47.1 PSVT (PAROXYSMAL SUPRAVENTRICULAR TACHYCARDIA) (HCC): ICD-10-CM

## 2019-02-01 DIAGNOSIS — M15.9 PRIMARY OSTEOARTHRITIS INVOLVING MULTIPLE JOINTS: ICD-10-CM

## 2019-02-01 DIAGNOSIS — I10 ESSENTIAL HYPERTENSION: ICD-10-CM

## 2019-02-01 DIAGNOSIS — E78.2 MIXED HYPERLIPIDEMIA: ICD-10-CM

## 2019-02-25 RX ORDER — ROSUVASTATIN CALCIUM 20 MG/1
20 TABLET, COATED ORAL DAILY
Qty: 15 TABLET | Refills: 0 | Status: SHIPPED | OUTPATIENT
Start: 2019-02-25 | End: 2019-07-19 | Stop reason: SDUPTHER

## 2019-03-18 ENCOUNTER — APPOINTMENT (OUTPATIENT)
Dept: CT IMAGING | Facility: HOSPITAL | Age: 69
End: 2019-03-18

## 2019-03-18 ENCOUNTER — HOSPITAL ENCOUNTER (EMERGENCY)
Facility: HOSPITAL | Age: 69
Discharge: HOME | End: 2019-03-19
Attending: EMERGENCY MEDICINE | Admitting: EMERGENCY MEDICINE

## 2019-03-18 DIAGNOSIS — N17.9 AKI (ACUTE KIDNEY INJURY) (HCC): ICD-10-CM

## 2019-03-18 DIAGNOSIS — N30.90 CYSTITIS: ICD-10-CM

## 2019-03-18 DIAGNOSIS — N20.1 RIGHT URETERAL STONE: Primary | ICD-10-CM

## 2019-03-18 LAB
ALBUMIN SERPL-MCNC: 4.5 G/DL (ref 3.5–5.2)
ALBUMIN/GLOB SERPL: 1.3 G/DL
ALP SERPL-CCNC: 112 U/L (ref 39–117)
ALT SERPL W P-5'-P-CCNC: 11 U/L (ref 1–33)
ANION GAP SERPL CALCULATED.3IONS-SCNC: 14.2 MMOL/L
AST SERPL-CCNC: 12 U/L (ref 1–32)
BASOPHILS # BLD AUTO: 0.04 10*3/MM3 (ref 0–0.2)
BASOPHILS NFR BLD AUTO: 0.2 % (ref 0–1.5)
BILIRUB SERPL-MCNC: 0.4 MG/DL (ref 0.2–1.2)
BUN BLD-MCNC: 34 MG/DL (ref 8–23)
BUN/CREAT SERPL: 16.3 (ref 7–25)
CALCIUM SPEC-SCNC: 10.2 MG/DL (ref 8.6–10.5)
CHLORIDE SERPL-SCNC: 105 MMOL/L (ref 98–107)
CO2 SERPL-SCNC: 19.8 MMOL/L (ref 22–29)
CREAT BLD-MCNC: 2.08 MG/DL (ref 0.57–1)
DEPRECATED RDW RBC AUTO: 47.8 FL (ref 37–54)
EOSINOPHIL # BLD AUTO: 0 10*3/MM3 (ref 0–0.4)
EOSINOPHIL NFR BLD AUTO: 0 % (ref 0.3–6.2)
ERYTHROCYTE [DISTWIDTH] IN BLOOD BY AUTOMATED COUNT: 13.2 % (ref 12.3–15.4)
GFR SERPL CREATININE-BSD FRML MDRD: 24 ML/MIN/1.73
GLOBULIN UR ELPH-MCNC: 3.5 GM/DL
GLUCOSE BLD-MCNC: 148 MG/DL (ref 65–99)
HCT VFR BLD AUTO: 43.3 % (ref 34–46.6)
HGB BLD-MCNC: 13.7 G/DL (ref 12–15.9)
IMM GRANULOCYTES # BLD AUTO: 0.1 10*3/MM3 (ref 0–0.05)
IMM GRANULOCYTES NFR BLD AUTO: 0.6 % (ref 0–0.5)
LIPASE SERPL-CCNC: 12 U/L (ref 13–60)
LYMPHOCYTES # BLD AUTO: 0.35 10*3/MM3 (ref 0.7–3.1)
LYMPHOCYTES NFR BLD AUTO: 2.1 % (ref 19.6–45.3)
MCH RBC QN AUTO: 31 PG (ref 26.6–33)
MCHC RBC AUTO-ENTMCNC: 31.6 G/DL (ref 31.5–35.7)
MCV RBC AUTO: 98 FL (ref 79–97)
MONOCYTES # BLD AUTO: 0.83 10*3/MM3 (ref 0.1–0.9)
MONOCYTES NFR BLD AUTO: 5 % (ref 5–12)
NEUTROPHILS # BLD AUTO: 15.16 10*3/MM3 (ref 1.4–7)
NEUTROPHILS NFR BLD AUTO: 92.1 % (ref 42.7–76)
NRBC BLD AUTO-RTO: 0 /100 WBC (ref 0–0)
PLATELET # BLD AUTO: 263 10*3/MM3 (ref 140–450)
PMV BLD AUTO: 9.1 FL (ref 6–12)
POTASSIUM BLD-SCNC: 4.1 MMOL/L (ref 3.5–5.2)
PROT SERPL-MCNC: 8 G/DL (ref 6–8.5)
RBC # BLD AUTO: 4.42 10*6/MM3 (ref 3.77–5.28)
SODIUM BLD-SCNC: 139 MMOL/L (ref 136–145)
WBC NRBC COR # BLD: 16.48 10*3/MM3 (ref 3.4–10.8)

## 2019-03-18 PROCEDURE — 96361 HYDRATE IV INFUSION ADD-ON: CPT

## 2019-03-18 PROCEDURE — 83690 ASSAY OF LIPASE: CPT | Performed by: EMERGENCY MEDICINE

## 2019-03-18 PROCEDURE — 85025 COMPLETE CBC W/AUTO DIFF WBC: CPT | Performed by: EMERGENCY MEDICINE

## 2019-03-18 PROCEDURE — 87086 URINE CULTURE/COLONY COUNT: CPT | Performed by: EMERGENCY MEDICINE

## 2019-03-18 PROCEDURE — 87088 URINE BACTERIA CULTURE: CPT | Performed by: EMERGENCY MEDICINE

## 2019-03-18 PROCEDURE — 81001 URINALYSIS AUTO W/SCOPE: CPT | Performed by: EMERGENCY MEDICINE

## 2019-03-18 PROCEDURE — 96376 TX/PRO/DX INJ SAME DRUG ADON: CPT

## 2019-03-18 PROCEDURE — 96374 THER/PROPH/DIAG INJ IV PUSH: CPT

## 2019-03-18 PROCEDURE — 99284 EMERGENCY DEPT VISIT MOD MDM: CPT

## 2019-03-18 PROCEDURE — 87186 SC STD MICRODIL/AGAR DIL: CPT | Performed by: EMERGENCY MEDICINE

## 2019-03-18 PROCEDURE — 25010000002 MORPHINE PER 10 MG: Performed by: EMERGENCY MEDICINE

## 2019-03-18 PROCEDURE — 80053 COMPREHEN METABOLIC PANEL: CPT | Performed by: EMERGENCY MEDICINE

## 2019-03-18 PROCEDURE — 74176 CT ABD & PELVIS W/O CONTRAST: CPT

## 2019-03-18 RX ORDER — SODIUM CHLORIDE 0.9 % (FLUSH) 0.9 %
10 SYRINGE (ML) INJECTION AS NEEDED
Status: DISCONTINUED | OUTPATIENT
Start: 2019-03-18 | End: 2019-03-19 | Stop reason: HOSPADM

## 2019-03-18 RX ORDER — ONDANSETRON 4 MG/1
4 TABLET, ORALLY DISINTEGRATING ORAL EVERY 8 HOURS PRN
Qty: 15 TABLET | Refills: 0 | Status: SHIPPED | OUTPATIENT
Start: 2019-03-18 | End: 2019-03-23

## 2019-03-18 RX ORDER — HYDROCODONE BITARTRATE AND ACETAMINOPHEN 5; 325 MG/1; MG/1
1 TABLET ORAL EVERY 6 HOURS PRN
Qty: 12 TABLET | Refills: 0 | Status: SHIPPED | OUTPATIENT
Start: 2019-03-18 | End: 2019-03-21

## 2019-03-18 RX ORDER — MORPHINE SULFATE 10 MG/ML
8 INJECTION INTRAMUSCULAR; INTRAVENOUS; SUBCUTANEOUS ONCE
Status: COMPLETED | OUTPATIENT
Start: 2019-03-18 | End: 2019-03-18

## 2019-03-18 RX ADMIN — MORPHINE SULFATE 8 MG: 10 INJECTION INTRAVENOUS at 22:29

## 2019-03-18 RX ADMIN — MORPHINE SULFATE 8 MG: 10 INJECTION INTRAVENOUS at 23:57

## 2019-03-18 RX ADMIN — SODIUM CHLORIDE, POTASSIUM CHLORIDE, SODIUM LACTATE AND CALCIUM CHLORIDE 1000 ML: 600; 310; 30; 20 INJECTION, SOLUTION INTRAVENOUS at 23:15

## 2019-03-19 VITALS
BODY MASS INDEX: 29.65 KG/M2 | OXYGEN SATURATION: 96 % | DIASTOLIC BLOOD PRESSURE: 94 MMHG | SYSTOLIC BLOOD PRESSURE: 166 MMHG | HEART RATE: 94 BPM | RESPIRATION RATE: 16 BRPM | TEMPERATURE: 99.3 F | HEIGHT: 68 IN

## 2019-03-19 LAB
BACTERIA UR QL AUTO: ABNORMAL /HPF
BILIRUB UR QL STRIP: NEGATIVE
CLARITY UR: CLEAR
COLOR UR: YELLOW
GLUCOSE UR STRIP-MCNC: NEGATIVE MG/DL
HGB UR QL STRIP.AUTO: ABNORMAL
HYALINE CASTS UR QL AUTO: ABNORMAL /LPF
KETONES UR QL STRIP: NEGATIVE
LEUKOCYTE ESTERASE UR QL STRIP.AUTO: ABNORMAL
NITRITE UR QL STRIP: NEGATIVE
PH UR STRIP.AUTO: 5.5 [PH] (ref 5–8)
PROT UR QL STRIP: ABNORMAL
RBC # UR: ABNORMAL /HPF
REF LAB TEST METHOD: ABNORMAL
SP GR UR STRIP: 1.02 (ref 1–1.03)
SQUAMOUS #/AREA URNS HPF: ABNORMAL /HPF
UROBILINOGEN UR QL STRIP: ABNORMAL
WBC UR QL AUTO: ABNORMAL /HPF

## 2019-03-19 RX ORDER — CEFDINIR 300 MG/1
300 CAPSULE ORAL EVERY 12 HOURS SCHEDULED
Status: COMPLETED | OUTPATIENT
Start: 2019-03-19 | End: 2019-03-19

## 2019-03-19 RX ORDER — CEFDINIR 300 MG/1
300 CAPSULE ORAL 2 TIMES DAILY
Qty: 28 CAPSULE | Refills: 0 | Status: SHIPPED | OUTPATIENT
Start: 2019-03-19 | End: 2019-04-02

## 2019-03-19 RX ORDER — CEFTRIAXONE SODIUM 1 G/50ML
1 INJECTION, SOLUTION INTRAVENOUS ONCE
Status: DISCONTINUED | OUTPATIENT
Start: 2019-03-19 | End: 2019-03-19

## 2019-03-19 RX ADMIN — CEFDINIR 300 MG: 300 CAPSULE ORAL at 00:58

## 2019-03-19 NOTE — ED TRIAGE NOTES
Pt was seen at [facility] earlier sent here for further evaluation. She was given 4mg zofran odt and toradol 15mg at the [facility]

## 2019-03-19 NOTE — ED PROVIDER NOTES
EMERGENCY DEPARTMENT ENCOUNTER    Room Number:  11/11  Date seen:  3/19/2019  Time seen: 9:52 PM  PCP: Felicia Gloria PA-C  Historian: patient      HPI:  Chief Complaint: radiating lower back pain  A complete HPI/ROS/PMH/PSH/SH/FH are unobtainable due to: n/a  Context: Leonard Best is a 68 y.o. female who presents to the ED c/o radiating lower back pain that started at 0345 this morning and progressively worsened throughout the day. Pt states that the pain radiates around her R flank into the R side of her abdomen. Pt also c/o nausea and vomiting with white sputum. Pt denies dysuria and blood in her urine. Pt states she has hx of HTN and smoking, but adds that she does not smoke anymore.       Pain Location: lower back  Radiation: around her R flank into the R side of her abdomen  Quality: radiating lower back pain  Intensity/Severity: moderate  Duration: since 0345 this morning  Onset quality: gradual  Timing: constant  Progression: worsening  Aggravating Factors: none  Alleviating Factors: none  Previous Episodes: none  Treatment before arrival: none  Associated Symptoms: R flank pain, R-sided abd pain, nausea, and vomiting (with white sputum)    PAST MEDICAL HISTORY  Active Ambulatory Problems     Diagnosis Date Noted   • Hypertension 12/30/2015   • Right knee pain 12/30/2015   • Hyperlipidemia 07/11/2016   • Osteoarthritis, multiple sites 08/18/2010   • Primary osteoarthritis of right knee 08/15/2016   • Vitamin D deficiency 01/09/2018   • PSVT (paroxysmal supraventricular tachycardia) (CMS/[facility]) 02/15/2018     Resolved Ambulatory Problems     Diagnosis Date Noted   • No Resolved Ambulatory Problems     Past Medical History:   Diagnosis Date   • History of indigestion    • History of staph infection 1980   • History of transfusion    • Hyperlipidemia    • Hypertension    • Kidney stone    • Mitral regurgitation    • Osteoarthritis of knee    • Osteoarthritis, multiple sites 08/18/2010   • PSVT (paroxysmal  supraventricular tachycardia) (CMS/HCC)    • Renal disorder    • Tricuspid regurgitation    • Vitamin D deficiency          PAST SURGICAL HISTORY  Past Surgical History:   Procedure Laterality Date   • CATARACT EXTRACTION     • FRACTURE SURGERY      left wrist   • LAPAROSCOPIC TUBAL LIGATION     • AL TOTAL KNEE ARTHROPLASTY Right 8/15/2016    Procedure: RT TOTAL KNEE ARTHROPLASTY;  Surgeon: Marlon Wills MD;  Location: [facility];  Service: Orthopedics   • THYROID SURGERY      thyroid nodule removed--removed about a third of her thyroid   • TONSILLECTOMY     • WRIST SURGERY Left 2003         FAMILY HISTORY  Family History   Problem Relation Age of Onset   • Cirrhosis Father          SOCIAL HISTORY  Social History     Socioeconomic History   • Marital status:      Spouse name: Not on file   • Number of children: Not on file   • Years of education: Not on file   • Highest education level: Not on file   Social Needs   • Financial resource strain: Not on file   • Food insecurity - worry: Not on file   • Food insecurity - inability: Not on file   • Transportation needs - medical: Not on file   • Transportation needs - non-medical: Not on file   Occupational History   • Not on file   Tobacco Use   • Smoking status: Former Smoker     Packs/day: 1.00     Years: 20.00     Pack years: 20.00     Types: Cigarettes     Last attempt to quit: 2015     Years since quittin.0   • Smokeless tobacco: Never Used   • Tobacco comment: CAFFEINE USE   Substance and Sexual Activity   • Alcohol use: Yes     Alcohol/week: 0.6 oz     Types: 1 Glasses of wine per week     Comment: OCCASIONAL   • Drug use: No   • Sexual activity: Defer     Birth control/protection: None   Other Topics Concern   • Not on file   Social History Narrative   • Not on file         ALLERGIES  Sulfa antibiotics; Ciprofloxacin; Levofloxacin; and Macrodantin [nitrofurantoin]        REVIEW OF SYSTEMS  Review of Systems   Constitutional: Negative for  fever.   HENT: Negative for sore throat.    Respiratory: Negative for shortness of breath.    Cardiovascular: Negative for chest pain.   Gastrointestinal: Positive for abdominal pain ( R-sided), nausea and vomiting ( white sputum).   Endocrine: Negative for polyuria.   Genitourinary: Positive for flank pain ( R-sided). Negative for dysuria and hematuria.   Musculoskeletal: Positive for back pain ( radiating lower back pain). Negative for neck pain.   Skin: Negative for rash.   Neurological: Negative for headaches.   All other systems reviewed and are negative.           PHYSICAL EXAM  ED Triage Vitals   Temp Heart Rate Resp BP SpO2   03/18/19 1945 03/18/19 1945 03/18/19 1945 03/18/19 2113 03/18/19 1945   99.3 °F (37.4 °C) 64 18 116/66 95 %      Temp src Heart Rate Source Patient Position BP Location FiO2 (%)   03/18/19 1945 03/18/19 1945 03/18/19 2113 03/18/19 2113 --   Tympanic Monitor Sitting Right arm          GENERAL: uncomfortable, nontoxic  HENT: nares patent  EYES: no scleral icterus  CV: regular rhythm, regular rate  RESPIRATORY: normal effort ctab  ABDOMEN: soft, RLQ tenderness, no rebound, no guarding, no CVA tenderness  MUSCULOSKELETAL: no deformity  NEURO: alert, DUQUE, FC  SKIN: warm, dry, no rash    Vital signs and nursing notes reviewed.      LAB RESULTS  Recent Results (from the past 24 hour(s))   Comprehensive Metabolic Panel    Collection Time: 03/18/19 10:19 PM   Result Value Ref Range    Glucose 148 (H) 65 - 99 mg/dL    BUN 34 (H) 8 - 23 mg/dL    Creatinine 2.08 (H) 0.57 - 1.00 mg/dL    Sodium 139 136 - 145 mmol/L    Potassium 4.1 3.5 - 5.2 mmol/L    Chloride 105 98 - 107 mmol/L    CO2 19.8 (L) 22.0 - 29.0 mmol/L    Calcium 10.2 8.6 - 10.5 mg/dL    Total Protein 8.0 6.0 - 8.5 g/dL    Albumin 4.50 3.50 - 5.20 g/dL    ALT (SGPT) 11 1 - 33 U/L    AST (SGOT) 12 1 - 32 U/L    Alkaline Phosphatase 112 39 - 117 U/L    Total Bilirubin 0.4 0.2 - 1.2 mg/dL    eGFR Non African Amer 24 (L) >60 mL/min/1.73     Globulin 3.5 gm/dL    A/G Ratio 1.3 g/dL    BUN/Creatinine Ratio 16.3 7.0 - 25.0    Anion Gap 14.2 mmol/L   Lipase    Collection Time: 03/18/19 10:19 PM   Result Value Ref Range    Lipase 12 (L) 13 - 60 U/L   CBC Auto Differential    Collection Time: 03/18/19 10:19 PM   Result Value Ref Range    WBC 16.48 (H) 3.40 - 10.80 10*3/mm3    RBC 4.42 3.77 - 5.28 10*6/mm3    Hemoglobin 13.7 12.0 - 15.9 g/dL    Hematocrit 43.3 34.0 - 46.6 %    MCV 98.0 (H) 79.0 - 97.0 fL    MCH 31.0 26.6 - 33.0 pg    MCHC 31.6 31.5 - 35.7 g/dL    RDW 13.2 12.3 - 15.4 %    RDW-SD 47.8 37.0 - 54.0 fl    MPV 9.1 6.0 - 12.0 fL    Platelets 263 140 - 450 10*3/mm3    Neutrophil % 92.1 (H) 42.7 - 76.0 %    Lymphocyte % 2.1 (L) 19.6 - 45.3 %    Monocyte % 5.0 5.0 - 12.0 %    Eosinophil % 0.0 (L) 0.3 - 6.2 %    Basophil % 0.2 0.0 - 1.5 %    Immature Grans % 0.6 (H) 0.0 - 0.5 %    Neutrophils, Absolute 15.16 (H) 1.40 - 7.00 10*3/mm3    Lymphocytes, Absolute 0.35 (L) 0.70 - 3.10 10*3/mm3    Monocytes, Absolute 0.83 0.10 - 0.90 10*3/mm3    Eosinophils, Absolute 0.00 0.00 - 0.40 10*3/mm3    Basophils, Absolute 0.04 0.00 - 0.20 10*3/mm3    Immature Grans, Absolute 0.10 (H) 0.00 - 0.05 10*3/mm3    nRBC 0.0 0.0 - 0.0 /100 WBC   Urinalysis With Culture If Indicated - Urine, Clean Catch    Collection Time: 03/18/19 11:54 PM   Result Value Ref Range    Color, UA Yellow Yellow, Straw    Appearance, UA Clear Clear    pH, UA 5.5 5.0 - 8.0    Specific Gravity, UA 1.023 1.005 - 1.030    Glucose, UA Negative Negative    Ketones, UA Negative Negative    Bilirubin, UA Negative Negative    Blood, UA Moderate (2+) (A) Negative    Protein,  mg/dL (2+) (A) Negative    Leuk Esterase, UA Small (1+) (A) Negative    Nitrite, UA Negative Negative    Urobilinogen, UA 0.2 E.U./dL 0.2 - 1.0 E.U./dL   Urinalysis, Microscopic Only - Urine, Clean Catch    Collection Time: 03/18/19 11:54 PM   Result Value Ref Range    RBC, UA 3-5 (A) None Seen, 0-2 /HPF    WBC, UA 31-50 (A) None  Seen, 0-2 /HPF    Bacteria, UA 4+ (A) None Seen /HPF    Squamous Epithelial Cells, UA 0-2 None Seen, 0-2 /HPF    Hyaline Casts, UA 0-2 None Seen /LPF    Methodology Automated Microscopy        Ordered the above labs and reviewed the results.        RADIOLOGY  CT Abdomen Pelvis Without Contrast   Final Result       1. Severe right-sided hydroureteronephrosis. This is secondary to at   least 2 stones seen within the distal right ureter. The more distal is   located approximately 4 mm from the right UVJ. There is extensive   periureteral and perinephric soft tissue stranding, with trace free   fluid also noted within the right upper quadrant.       Radiation dose reduction techniques were utilized, including automated   exposure control and exposure modulation based on body size.       This report was finalized on 3/18/2019 11:17 PM by Dr. Ginny Barba M.D.                 Ordered the above noted radiological studies. Reviewed by me in PACS.  Spoke with Dr. Barba (radiologist) regarding CT abd/pelvis scan results.          PROCEDURES  Procedures            MEDICATIONS GIVEN IN ER  Medications   sodium chloride 0.9 % flush 10 mL (not administered)   Morphine injection 8 mg (8 mg Intravenous Given 3/18/19 2229)   lactated ringers bolus 1,000 mL (0 mL Intravenous Stopped 3/19/19 0059)   Morphine injection 8 mg (8 mg Intravenous Given 3/18/19 2357)   cefdinir (OMNICEF) capsule 300 mg (300 mg Oral Given 3/19/19 0058)         PROGRESS AND CONSULTS     2157 Morphine injection ordered for pain management. CT A/P and Labs ordered for further evaluation.     2305 LR Bolus ordered for further tx.     2318 Call placed to Urology.     2341 Dr. Worley asked for patient to come to [place] office at 8am for management of stone.       MEDICAL DECISION MAKING      MDM  Number of Diagnoses or Management Options  OBINNA (acute kidney injury) (CMS/[facility]):   Cystitis:   Right ureteral stone:   Diagnosis management comments: Patient  with R distal UVJ stones with severe hydronephrosis. Urine with 4+ bacteria. She is not systemically ill and instead looks extremely comfortable on my multiple rechecks. I discussed case with Dr. Worley who plans to see patient at 8am at [facility]. I gave patient dose of antibiotics in ED. She is allergic to sulfa so no Flomax. Pain is well controlled. I believe that there is no additive benefit to admission if patient is systemically well appearing, tolerating PO, pain is controlled, and she is going to get definitive therapy with urology in 7 hours. UCx pending. She is historically sensitive to 3rd generation cephalosporins.        Amount and/or Complexity of Data Reviewed  Clinical lab tests: ordered and reviewed (WBC - 16.48)  Tests in the radiology section of CPT®: ordered and reviewed (Severe hydronephrosis with multiple distal R UVJ stones)  Decide to obtain previous medical records or to obtain history from someone other than the patient: yes  Discuss the patient with other providers: yes (Dr. Worley (urology))  Independent visualization of images, tracings, or specimens: yes (Severe hydronephrosis with multiple distal R UVJ stones)               DIAGNOSIS  Final diagnoses:   Right ureteral stone   Cystitis   OBINAN (acute kidney injury) (CMS/[facility])         DISPOSITION  Discharge            Latest Documented Vital Signs:  As of 1:46 AM  BP- 166/94 HR- 94 Temp- 99.3 °F (37.4 °C) (Tympanic) O2 sat- 96%        --  Documentation assistance provided by haven Ya for Dr. Faizan MD.  Information recorded by the scribe was done at my direction and has been verified and validated by me.                 Yuri Ya  03/18/19 [id number]       Trey Gloria II, MD  03/19/19 [id number]

## 2019-03-21 LAB — BACTERIA SPEC AEROBE CULT: ABNORMAL

## 2019-05-02 RX ORDER — METOPROLOL SUCCINATE 25 MG/1
TABLET, EXTENDED RELEASE ORAL
Qty: 30 TABLET | Refills: 0 | Status: SHIPPED | OUTPATIENT
Start: 2019-05-02 | End: 2019-06-06 | Stop reason: SDUPTHER

## 2019-05-02 RX ORDER — LISINOPRIL 40 MG/1
40 TABLET ORAL DAILY
Qty: 30 TABLET | Refills: 0 | Status: SHIPPED | OUTPATIENT
Start: 2019-05-02 | End: 2019-06-06 | Stop reason: SDUPTHER

## 2019-06-07 RX ORDER — LISINOPRIL 40 MG/1
40 TABLET ORAL DAILY
Qty: 30 TABLET | Refills: 0 | Status: SHIPPED | OUTPATIENT
Start: 2019-06-07 | End: 2019-07-09

## 2019-06-07 RX ORDER — METOPROLOL SUCCINATE 25 MG/1
TABLET, EXTENDED RELEASE ORAL
Qty: 30 TABLET | Refills: 0 | Status: SHIPPED | OUTPATIENT
Start: 2019-06-07 | End: 2019-07-09 | Stop reason: SDUPTHER

## 2019-07-09 RX ORDER — METOPROLOL SUCCINATE 25 MG/1
TABLET, EXTENDED RELEASE ORAL
Qty: 30 TABLET | Refills: 0 | Status: SHIPPED | OUTPATIENT
Start: 2019-07-09 | End: 2019-08-11 | Stop reason: SDUPTHER

## 2019-07-09 RX ORDER — LISINOPRIL 40 MG/1
TABLET ORAL
Qty: 30 TABLET | Refills: 0 | Status: SHIPPED | OUTPATIENT
Start: 2019-07-09 | End: 2019-07-20

## 2019-07-19 ENCOUNTER — OFFICE VISIT (OUTPATIENT)
Dept: FAMILY MEDICINE CLINIC | Facility: CLINIC | Age: 69
End: 2019-07-19

## 2019-07-19 VITALS
BODY MASS INDEX: 30.7 KG/M2 | HEART RATE: 68 BPM | DIASTOLIC BLOOD PRESSURE: 84 MMHG | HEIGHT: 66 IN | SYSTOLIC BLOOD PRESSURE: 124 MMHG | TEMPERATURE: 98.7 F | WEIGHT: 191 LBS | OXYGEN SATURATION: 96 % | RESPIRATION RATE: 16 BRPM

## 2019-07-19 DIAGNOSIS — E55.9 VITAMIN D DEFICIENCY: ICD-10-CM

## 2019-07-19 DIAGNOSIS — E03.9 HYPOTHYROIDISM, ACQUIRED: ICD-10-CM

## 2019-07-19 DIAGNOSIS — E53.8 LOW SERUM VITAMIN B12: ICD-10-CM

## 2019-07-19 DIAGNOSIS — D22.9 ATYPICAL NEVI: ICD-10-CM

## 2019-07-19 DIAGNOSIS — M15.9 PRIMARY OSTEOARTHRITIS INVOLVING MULTIPLE JOINTS: ICD-10-CM

## 2019-07-19 DIAGNOSIS — I10 ESSENTIAL HYPERTENSION: Primary | ICD-10-CM

## 2019-07-19 DIAGNOSIS — R79.9 ABNORMAL FINDING OF BLOOD CHEMISTRY: ICD-10-CM

## 2019-07-19 DIAGNOSIS — E53.8 FOLIC ACID DEFICIENCY: ICD-10-CM

## 2019-07-19 DIAGNOSIS — I47.1 PSVT (PAROXYSMAL SUPRAVENTRICULAR TACHYCARDIA) (HCC): ICD-10-CM

## 2019-07-19 DIAGNOSIS — N18.4 CKD (CHRONIC KIDNEY DISEASE) STAGE 4, GFR 15-29 ML/MIN (HCC): ICD-10-CM

## 2019-07-19 PROCEDURE — 99214 OFFICE O/P EST MOD 30 MIN: CPT | Performed by: PHYSICIAN ASSISTANT

## 2019-07-19 RX ORDER — CEPHALEXIN 250 MG/1
CAPSULE ORAL
Refills: 2 | COMMUNITY
Start: 2019-07-09 | End: 2019-11-07

## 2019-07-19 RX ORDER — ROSUVASTATIN CALCIUM 20 MG/1
20 TABLET, COATED ORAL DAILY
Qty: 30 TABLET | Refills: 11 | Status: SHIPPED | OUTPATIENT
Start: 2019-07-19 | End: 2019-07-20 | Stop reason: SDUPTHER

## 2019-07-19 NOTE — PATIENT INSTRUCTIONS

## 2019-07-19 NOTE — PROGRESS NOTES
Subjective   Leonard Best is a 69 y.o. female.     History of Present Illness   Leonard Best 69 y.o. female who presents today for routine follow up check and medication refills.  she has a history of   Patient Active Problem List   Diagnosis   • Hypertension   • Right knee pain   • Hyperlipidemia   • Osteoarthritis, multiple sites   • Primary osteoarthritis of right knee   • Vitamin D deficiency   • PSVT (paroxysmal supraventricular tachycardia) (CMS/HCC)   .  Since the last visit, she has overall felt fairly well.  She has Hypertenision and is well controlled on medication, Hyperlipidemia and working on this with diet and exercise and Vitamin D deficiency and will update labs to confirm level is at goal >30.  she has not been taking Crestor.  The patient denies medication side effects.  Some concern with recent depression and can consider SSRI; was on Celexa and would do Lexapro    Results for orders placed or performed during the hospital encounter of 03/18/19   Urine Culture - Urine, Urine, Clean Catch   Result Value Ref Range    Urine Culture (A)      >100,000 CFU/mL Klebsiella pneumoniae ssp pneumoniae       Susceptibility    Klebsiella pneumoniae ssp pneumoniae - ANDRA     Ampicillin 16 Resistant ug/ml     Ampicillin + Sulbactam 4 Susceptible ug/ml     Cefazolin <=4 Susceptible ug/ml     Cefepime <=1 Susceptible ug/ml     Ceftazidime <=1 Susceptible ug/ml     Ceftriaxone <=1 Susceptible ug/ml     Gentamicin <=1 Susceptible ug/ml     Levofloxacin <=0.12 Susceptible ug/ml     Nitrofurantoin 64 Intermediate ug/ml     Piperacillin + Tazobactam <=4 Susceptible ug/ml     Tetracycline <=1 Susceptible ug/ml     Trimethoprim + Sulfamethoxazole <=20 Susceptible ug/ml   Comprehensive Metabolic Panel   Result Value Ref Range    Glucose 148 (H) 65 - 99 mg/dL    BUN 34 (H) 8 - 23 mg/dL    Creatinine 2.08 (H) 0.57 - 1.00 mg/dL    Sodium 139 136 - 145 mmol/L    Potassium 4.1 3.5 - 5.2 mmol/L    Chloride 105 98 - 107  mmol/L    CO2 19.8 (L) 22.0 - 29.0 mmol/L    Calcium 10.2 8.6 - 10.5 mg/dL    Total Protein 8.0 6.0 - 8.5 g/dL    Albumin 4.50 3.50 - 5.20 g/dL    ALT (SGPT) 11 1 - 33 U/L    AST (SGOT) 12 1 - 32 U/L    Alkaline Phosphatase 112 39 - 117 U/L    Total Bilirubin 0.4 0.2 - 1.2 mg/dL    eGFR Non African Amer 24 (L) >60 mL/min/1.73    Globulin 3.5 gm/dL    A/G Ratio 1.3 g/dL    BUN/Creatinine Ratio 16.3 7.0 - 25.0    Anion Gap 14.2 mmol/L   Lipase   Result Value Ref Range    Lipase 12 (L) 13 - 60 U/L   CBC Auto Differential   Result Value Ref Range    WBC 16.48 (H) 3.40 - 10.80 10*3/mm3    RBC 4.42 3.77 - 5.28 10*6/mm3    Hemoglobin 13.7 12.0 - 15.9 g/dL    Hematocrit 43.3 34.0 - 46.6 %    MCV 98.0 (H) 79.0 - 97.0 fL    MCH 31.0 26.6 - 33.0 pg    MCHC 31.6 31.5 - 35.7 g/dL    RDW 13.2 12.3 - 15.4 %    RDW-SD 47.8 37.0 - 54.0 fl    MPV 9.1 6.0 - 12.0 fL    Platelets 263 140 - 450 10*3/mm3    Neutrophil % 92.1 (H) 42.7 - 76.0 %    Lymphocyte % 2.1 (L) 19.6 - 45.3 %    Monocyte % 5.0 5.0 - 12.0 %    Eosinophil % 0.0 (L) 0.3 - 6.2 %    Basophil % 0.2 0.0 - 1.5 %    Immature Grans % 0.6 (H) 0.0 - 0.5 %    Neutrophils, Absolute 15.16 (H) 1.40 - 7.00 10*3/mm3    Lymphocytes, Absolute 0.35 (L) 0.70 - 3.10 10*3/mm3    Monocytes, Absolute 0.83 0.10 - 0.90 10*3/mm3    Eosinophils, Absolute 0.00 0.00 - 0.40 10*3/mm3    Basophils, Absolute 0.04 0.00 - 0.20 10*3/mm3    Immature Grans, Absolute 0.10 (H) 0.00 - 0.05 10*3/mm3    nRBC 0.0 0.0 - 0.0 /100 WBC   Urinalysis With Culture If Indicated - Urine, Clean Catch   Result Value Ref Range    Color, UA Yellow Yellow, Straw    Appearance, UA Clear Clear    pH, UA 5.5 5.0 - 8.0    Specific Gravity, UA 1.023 1.005 - 1.030    Glucose, UA Negative Negative    Ketones, UA Negative Negative    Bilirubin, UA Negative Negative    Blood, UA Moderate (2+) (A) Negative    Protein,  mg/dL (2+) (A) Negative    Leuk Esterase, UA Small (1+) (A) Negative    Nitrite, UA Negative Negative     Urobilinogen, UA 0.2 E.U./dL 0.2 - 1.0 E.U./dL   Urinalysis, Microscopic Only - Urine, Clean Catch   Result Value Ref Range    RBC, UA 3-5 (A) None Seen, 0-2 /HPF    WBC, UA 31-50 (A) None Seen, 0-2 /HPF    Bacteria, UA 4+ (A) None Seen /HPF    Squamous Epithelial Cells, UA 0-2 None Seen, 0-2 /HPF    Hyaline Casts, UA 0-2 None Seen /LPF    Methodology Automated Microscopy      Nevi left cheek larger--refer to derm  Still back pain at times    I need her to see Dr Woods to f/u PVST hx  She is seeing Dr. Rincon for UTI and stones--on ab for 3mos    I still need her to take Crestor  I need f/u from March ER with stones --creatinine was 2----must have labs today to cont ACE.    Decline mammo and DEXA  Partial thyroidectomy for nodules  Consider CTA chest  The following portions of the patient's history were reviewed and updated as appropriate: allergies, current medications, past family history, past medical history, past social history, past surgical history and problem list.    Review of Systems   Constitutional: Negative for activity change, appetite change and unexpected weight change.   HENT: Negative for nosebleeds and trouble swallowing.    Eyes: Negative for pain and visual disturbance.   Respiratory: Negative for chest tightness, shortness of breath and wheezing.    Cardiovascular: Negative for chest pain and palpitations.   Gastrointestinal: Negative for abdominal pain and blood in stool.   Endocrine: Negative.    Genitourinary: Negative for difficulty urinating and hematuria.   Musculoskeletal: Negative for joint swelling.   Skin: Negative for color change and rash.   Allergic/Immunologic: Negative.    Neurological: Negative for syncope and speech difficulty.   Hematological: Negative for adenopathy.   Psychiatric/Behavioral: Negative for agitation and confusion.   All other systems reviewed and are negative.      Objective   Physical Exam   Constitutional: She is oriented to person, place, and time. She  appears well-developed and well-nourished. No distress.   HENT:   Head: Normocephalic and atraumatic.   Eyes: Conjunctivae and EOM are normal. Pupils are equal, round, and reactive to light. Right eye exhibits no discharge. Left eye exhibits no discharge. No scleral icterus.   Neck: Normal range of motion. Neck supple. No tracheal deviation present. No thyromegaly present.   Cardiovascular: Normal rate, regular rhythm, normal heart sounds, intact distal pulses and normal pulses. Exam reveals no gallop.   No murmur heard.  Pulmonary/Chest: Effort normal and breath sounds normal. No respiratory distress. She has no wheezes. She has no rales.   Musculoskeletal: Normal range of motion.   Neurological: She is alert and oriented to person, place, and time. She exhibits normal muscle tone. Coordination normal.   Skin: Skin is warm. No rash noted. No erythema. No pallor.   Psychiatric: She has a normal mood and affect. Her behavior is normal. Judgment and thought content normal.   Nursing note and vitals reviewed.      Assessment/Plan   Leonard was seen today for hypertension and med management.    Diagnoses and all orders for this visit:    Essential hypertension  -     Comprehensive metabolic panel  -     Lipid panel  -     CBC and Differential  -     TSH  -     Hemoglobin A1c  -     T3, Free  -     T4, Free  -     Vitamin B12  -     Folate  -     Vitamin D 25 Hydroxy  -     Ambulatory Referral to Dermatology    PSVT (paroxysmal supraventricular tachycardia) (CMS/HCC)  -     Comprehensive metabolic panel  -     Lipid panel  -     CBC and Differential  -     TSH  -     Hemoglobin A1c  -     T3, Free  -     T4, Free  -     Vitamin B12  -     Folate  -     Vitamin D 25 Hydroxy  -     Ambulatory Referral to Dermatology    Primary osteoarthritis involving multiple joints  -     Comprehensive metabolic panel  -     Lipid panel  -     CBC and Differential  -     TSH  -     Hemoglobin A1c  -     T3, Free  -     T4, Free  -      Vitamin B12  -     Folate  -     Vitamin D 25 Hydroxy  -     Ambulatory Referral to Dermatology    Vitamin D deficiency  -     Comprehensive metabolic panel  -     Lipid panel  -     CBC and Differential  -     TSH  -     Hemoglobin A1c  -     T3, Free  -     T4, Free  -     Vitamin B12  -     Folate  -     Vitamin D 25 Hydroxy  -     Ambulatory Referral to Dermatology    Atypical nevi  -     Ambulatory Referral to Dermatology    Abnormal finding of blood chemistry   -     Hemoglobin A1c        In summary, Leonard Best, was seen today.  she was seen for  Hypertenision and is well controlled on medication, Hyperlipidemia and working on this with diet and exercise and Vitamin D deficiency and will update labs to confirm level is at goal >30,  Restart Crestor  Labs and then refill bp med  F/u cardio

## 2019-07-20 PROBLEM — E53.8 LOW SERUM VITAMIN B12: Status: ACTIVE | Noted: 2019-07-20

## 2019-07-20 PROBLEM — E03.9 HYPOTHYROIDISM, ACQUIRED: Status: ACTIVE | Noted: 2019-07-20

## 2019-07-20 PROBLEM — E53.8 FOLIC ACID DEFICIENCY: Status: ACTIVE | Noted: 2019-07-20

## 2019-07-20 LAB
25(OH)D3+25(OH)D2 SERPL-MCNC: 30.8 NG/ML (ref 30–100)
ALBUMIN SERPL-MCNC: 4.2 G/DL (ref 3.5–5.2)
ALBUMIN/GLOB SERPL: 1.5 G/DL
ALP SERPL-CCNC: 112 U/L (ref 39–117)
ALT SERPL-CCNC: 8 U/L (ref 1–33)
AST SERPL-CCNC: 12 U/L (ref 1–32)
BASOPHILS # BLD AUTO: 0.09 10*3/MM3 (ref 0–0.2)
BASOPHILS NFR BLD AUTO: 1.3 % (ref 0–1.5)
BILIRUB SERPL-MCNC: <0.2 MG/DL (ref 0.2–1.2)
BUN SERPL-MCNC: 24 MG/DL (ref 8–23)
BUN/CREAT SERPL: 13 (ref 7–25)
CALCIUM SERPL-MCNC: 9.8 MG/DL (ref 8.6–10.5)
CHLORIDE SERPL-SCNC: 106 MMOL/L (ref 98–107)
CHOLEST SERPL-MCNC: 308 MG/DL (ref 0–200)
CO2 SERPL-SCNC: 20.3 MMOL/L (ref 22–29)
CREAT SERPL-MCNC: 1.84 MG/DL (ref 0.57–1)
EOSINOPHIL # BLD AUTO: 0.34 10*3/MM3 (ref 0–0.4)
EOSINOPHIL NFR BLD AUTO: 4.8 % (ref 0.3–6.2)
ERYTHROCYTE [DISTWIDTH] IN BLOOD BY AUTOMATED COUNT: 15.8 % (ref 12.3–15.4)
FOLATE SERPL-MCNC: 4.45 NG/ML (ref 4.78–24.2)
GLOBULIN SER CALC-MCNC: 2.8 GM/DL
GLUCOSE SERPL-MCNC: 86 MG/DL (ref 65–99)
HBA1C MFR BLD: 4.91 % (ref 4.8–5.6)
HCT VFR BLD AUTO: 43.3 % (ref 34–46.6)
HDLC SERPL-MCNC: 56 MG/DL (ref 40–60)
HGB BLD-MCNC: 13.3 G/DL (ref 12–15.9)
IMM GRANULOCYTES # BLD AUTO: 0.07 10*3/MM3 (ref 0–0.05)
IMM GRANULOCYTES NFR BLD AUTO: 1 % (ref 0–0.5)
LDLC SERPL CALC-MCNC: 217 MG/DL (ref 0–100)
LYMPHOCYTES # BLD AUTO: 1.73 10*3/MM3 (ref 0.7–3.1)
LYMPHOCYTES NFR BLD AUTO: 24.6 % (ref 19.6–45.3)
MCH RBC QN AUTO: 31.2 PG (ref 26.6–33)
MCHC RBC AUTO-ENTMCNC: 30.7 G/DL (ref 31.5–35.7)
MCV RBC AUTO: 101.6 FL (ref 79–97)
MONOCYTES # BLD AUTO: 0.52 10*3/MM3 (ref 0.1–0.9)
MONOCYTES NFR BLD AUTO: 7.4 % (ref 5–12)
NEUTROPHILS # BLD AUTO: 4.27 10*3/MM3 (ref 1.7–7)
NEUTROPHILS NFR BLD AUTO: 60.9 % (ref 42.7–76)
NRBC BLD AUTO-RTO: 0 /100 WBC (ref 0–0.2)
PLATELET # BLD AUTO: 294 10*3/MM3 (ref 140–450)
POTASSIUM SERPL-SCNC: 5.2 MMOL/L (ref 3.5–5.2)
PROT SERPL-MCNC: 7 G/DL (ref 6–8.5)
RBC # BLD AUTO: 4.26 10*6/MM3 (ref 3.77–5.28)
SODIUM SERPL-SCNC: 141 MMOL/L (ref 136–145)
T3FREE SERPL-MCNC: 2.1 PG/ML (ref 2–4.4)
T4 FREE SERPL-MCNC: 0.81 NG/DL (ref 0.93–1.7)
TRIGL SERPL-MCNC: 176 MG/DL (ref 0–150)
TSH SERPL DL<=0.005 MIU/L-ACNC: 0.92 MIU/ML (ref 0.27–4.2)
VIT B12 SERPL-MCNC: 320 PG/ML (ref 211–946)
VLDLC SERPL CALC-MCNC: 35.2 MG/DL
WBC # BLD AUTO: 7.02 10*3/MM3 (ref 3.4–10.8)

## 2019-07-20 RX ORDER — ROSUVASTATIN CALCIUM 20 MG/1
TABLET, COATED ORAL
Qty: 30 TABLET | Refills: 11 | Status: SHIPPED | OUTPATIENT
Start: 2019-07-20 | End: 2020-11-17

## 2019-07-20 RX ORDER — MAGNESIUM 200 MG
1000 TABLET ORAL DAILY
Qty: 90 EACH | Refills: 3 | Status: SHIPPED | OUTPATIENT
Start: 2019-07-20 | End: 2020-08-24

## 2019-07-20 RX ORDER — AMLODIPINE BESYLATE 5 MG/1
5 TABLET ORAL DAILY
Qty: 30 TABLET | Refills: 1 | Status: SHIPPED | OUTPATIENT
Start: 2019-07-20 | End: 2019-09-14 | Stop reason: SDUPTHER

## 2019-07-20 RX ORDER — LEVOTHYROXINE SODIUM 0.03 MG/1
25 TABLET ORAL DAILY
Qty: 30 TABLET | Refills: 11 | Status: SHIPPED | OUTPATIENT
Start: 2019-07-20 | End: 2020-03-14 | Stop reason: SDUPTHER

## 2019-07-20 RX ORDER — FOLIC ACID 1 MG/1
1 TABLET ORAL DAILY
Qty: 90 TABLET | Refills: 1 | Status: SHIPPED | OUTPATIENT
Start: 2019-07-20 | End: 2020-01-15

## 2019-07-26 LAB
BUN SERPL-MCNC: 20 MG/DL (ref 8–23)
BUN/CREAT SERPL: 11.8 (ref 7–25)
CALCIUM SERPL-MCNC: 9.6 MG/DL (ref 8.6–10.5)
CHLORIDE SERPL-SCNC: 105 MMOL/L (ref 98–107)
CO2 SERPL-SCNC: 23.1 MMOL/L (ref 22–29)
CREAT SERPL-MCNC: 1.7 MG/DL (ref 0.57–1)
GLUCOSE SERPL-MCNC: 111 MG/DL (ref 65–99)
POTASSIUM SERPL-SCNC: 4.6 MMOL/L (ref 3.5–5.2)
SODIUM SERPL-SCNC: 141 MMOL/L (ref 136–145)

## 2019-08-11 RX ORDER — METOPROLOL SUCCINATE 25 MG/1
TABLET, EXTENDED RELEASE ORAL
Qty: 30 TABLET | Refills: 0 | Status: SHIPPED | OUTPATIENT
Start: 2019-08-11 | End: 2019-09-10 | Stop reason: SDUPTHER

## 2019-09-10 RX ORDER — METOPROLOL SUCCINATE 25 MG/1
TABLET, EXTENDED RELEASE ORAL
Qty: 30 TABLET | Refills: 0 | Status: SHIPPED | OUTPATIENT
Start: 2019-09-10 | End: 2019-10-09 | Stop reason: SDUPTHER

## 2019-09-15 RX ORDER — AMLODIPINE BESYLATE 5 MG/1
TABLET ORAL
Qty: 30 TABLET | Refills: 0 | Status: SHIPPED | OUTPATIENT
Start: 2019-09-15 | End: 2019-10-09 | Stop reason: SDUPTHER

## 2019-10-09 RX ORDER — METOPROLOL SUCCINATE 25 MG/1
TABLET, EXTENDED RELEASE ORAL
Qty: 30 TABLET | Refills: 0 | Status: SHIPPED | OUTPATIENT
Start: 2019-10-09 | End: 2019-11-07 | Stop reason: SDUPTHER

## 2019-10-09 RX ORDER — AMLODIPINE BESYLATE 5 MG/1
TABLET ORAL
Qty: 30 TABLET | Refills: 0 | Status: SHIPPED | OUTPATIENT
Start: 2019-10-09 | End: 2019-11-07 | Stop reason: SDUPTHER

## 2019-11-07 ENCOUNTER — OFFICE VISIT (OUTPATIENT)
Dept: FAMILY MEDICINE CLINIC | Facility: CLINIC | Age: 69
End: 2019-11-07

## 2019-11-07 VITALS
HEIGHT: 66 IN | HEART RATE: 85 BPM | WEIGHT: 202 LBS | BODY MASS INDEX: 32.47 KG/M2 | OXYGEN SATURATION: 100 % | RESPIRATION RATE: 16 BRPM | SYSTOLIC BLOOD PRESSURE: 130 MMHG | DIASTOLIC BLOOD PRESSURE: 80 MMHG | TEMPERATURE: 98.4 F

## 2019-11-07 DIAGNOSIS — R60.0 PEDAL EDEMA: ICD-10-CM

## 2019-11-07 DIAGNOSIS — R00.0 SINUS TACHYCARDIA: ICD-10-CM

## 2019-11-07 DIAGNOSIS — R06.02 SHORTNESS OF BREATH: Primary | ICD-10-CM

## 2019-11-07 PROCEDURE — 71046 X-RAY EXAM CHEST 2 VIEWS: CPT | Performed by: PHYSICIAN ASSISTANT

## 2019-11-07 PROCEDURE — 99213 OFFICE O/P EST LOW 20 MIN: CPT | Performed by: PHYSICIAN ASSISTANT

## 2019-11-07 PROCEDURE — 93000 ELECTROCARDIOGRAM COMPLETE: CPT | Performed by: PHYSICIAN ASSISTANT

## 2019-11-07 RX ORDER — METOPROLOL SUCCINATE 25 MG/1
25 TABLET, EXTENDED RELEASE ORAL DAILY
Qty: 90 TABLET | Refills: 1 | Status: SHIPPED | OUTPATIENT
Start: 2019-11-07 | End: 2020-02-24 | Stop reason: SDUPTHER

## 2019-11-07 RX ORDER — AMLODIPINE BESYLATE 5 MG/1
5 TABLET ORAL DAILY
Qty: 90 TABLET | Refills: 1 | Status: SHIPPED | OUTPATIENT
Start: 2019-11-07 | End: 2020-02-24 | Stop reason: SDUPTHER

## 2019-11-07 NOTE — PROGRESS NOTES
Procedure     ECG 12 Lead  Date/Time: 11/7/2019 3:39 PM  Performed by: Felicia Gloria PA-C  Authorized by: Felicia Gloria PA-C   Comparison: compared with previous ECG from 2/15/2018  Similar to previous ECG  Rhythm: sinus rhythm  Rate: normal  Conduction: conduction normal  ST Segments: ST segments normal  T Waves: T waves normal  T inversion: V1 and III  QRS axis: normal  Other: no other findings    Clinical impression: normal ECG  Comments: EKG Interpretation Report    Heart rate:    81 beats/min, [place] interval:  162 msec, QRS duration:  88 msec  QTu:352 msec, QTc:  408 msec

## 2019-11-07 NOTE — PROGRESS NOTES
"Subjective   Leonard Best is a 69 y.o. female.     History of Present Illness   Leonard Best 69 y.o. female who presents for evaluation of SOA. Symptoms include new episodes of SOA.  This significant Saturday and was walking on side walk and had \"air hunger\".  Had to stop and rest; this lasted a few minutes and then fine; did not happen again.  Occas SOA with exertion, not every time. No hx VTE or fam VTE.  Had car trip Oct 15, 19..  Onset of symptoms was several days ago, not happened again but some SOA with exertion at times since that time. Patient denies chest pain, calf pain, sweats, jaw pain, arm pain.   Evaluation to date: none.    No smoking for 6 years  X-Ray  Interpretation report in house X-rays that I personally viewed    Relevant Clinical Issues/Diagnoses/Indications:  SOA and former smoker      Clinical Findings:  Normal heart size; no mass, no infiltrates          Comparative Data:  Not here          Date of Previous X-ray:    Change on current X-ray:      She has been to DR Woods in past and saw her after I sent her for SVT  She is noting more pedal edema is on feet in last few mos    Results for orders placed or performed in visit on 07/19/19   Comprehensive metabolic panel   Result Value Ref Range    Glucose 86 65 - 99 mg/dL    BUN 24 (H) 8 - 23 mg/dL    Creatinine 1.84 (H) 0.57 - 1.00 mg/dL    eGFR Non African Am 27 (L) >60 mL/min/1.73    eGFR African Am 33 (L) >60 mL/min/1.73    BUN/Creatinine Ratio 13.0 7.0 - 25.0    Sodium 141 136 - 145 mmol/L    Potassium 5.2 3.5 - 5.2 mmol/L    Chloride 106 98 - 107 mmol/L    Total CO2 20.3 (L) 22.0 - 29.0 mmol/L    Calcium 9.8 8.6 - 10.5 mg/dL    Total Protein 7.0 6.0 - 8.5 g/dL    Albumin 4.20 3.50 - 5.20 g/dL    Globulin 2.8 gm/dL    A/G Ratio 1.5 g/dL    Total Bilirubin <0.2 (L) 0.2 - 1.2 mg/dL    Alkaline Phosphatase 112 39 - 117 U/L    AST (SGOT) 12 1 - 32 U/L    ALT (SGPT) 8 1 - 33 U/L   Lipid panel   Result Value Ref Range    Total Cholesterol " 308 (H) 0 - 200 mg/dL    Triglycerides 176 (H) 0 - 150 mg/dL    HDL Cholesterol 56 40 - 60 mg/dL    VLDL Cholesterol 35.2 mg/dL    LDL Cholesterol  217 (H) 0 - 100 mg/dL   TSH   Result Value Ref Range    TSH 0.915 0.270 - 4.200 mIU/mL   Hemoglobin A1c   Result Value Ref Range    Hemoglobin A1C 4.91 4.80 - 5.60 %   T3, Free   Result Value Ref Range    T3, Free 2.1 2.0 - 4.4 pg/mL   T4, Free   Result Value Ref Range    Free T4 0.81 (L) 0.93 - 1.70 ng/dL   Vitamin B12   Result Value Ref Range    Vitamin B-12 320 211 - 946 pg/mL   Folate   Result Value Ref Range    Folate 4.45 (L) 4.78 - 24.20 ng/mL   Vitamin D 25 Hydroxy   Result Value Ref Range    25 Hydroxy, Vitamin D 30.8 30.0 - 100.0 ng/ml   Basic Metabolic Panel   Result Value Ref Range    Glucose 111 (H) 65 - 99 mg/dL    BUN 20 8 - 23 mg/dL    Creatinine 1.70 (H) 0.57 - 1.00 mg/dL    eGFR Non African Am 30 (L) >60 mL/min/1.73    eGFR  Am 36 (L) >60 mL/min/1.73    BUN/Creatinine Ratio 11.8 7.0 - 25.0    Sodium 141 136 - 145 mmol/L    Potassium 4.6 3.5 - 5.2 mmol/L    Chloride 105 98 - 107 mmol/L    Total CO2 23.1 22.0 - 29.0 mmol/L    Calcium 9.6 8.6 - 10.5 mg/dL   CBC and Differential   Result Value Ref Range    WBC 7.02 3.40 - 10.80 10*3/mm3    RBC 4.26 3.77 - 5.28 10*6/mm3    Hemoglobin 13.3 12.0 - 15.9 g/dL    Hematocrit 43.3 34.0 - 46.6 %    .6 (H) 79.0 - 97.0 fL    MCH 31.2 26.6 - 33.0 pg    MCHC 30.7 (L) 31.5 - 35.7 g/dL    RDW 15.8 (H) 12.3 - 15.4 %    Platelets 294 140 - 450 10*3/mm3    Neutrophil Rel % 60.9 42.7 - 76.0 %    Lymphocyte Rel % 24.6 19.6 - 45.3 %    Monocyte Rel % 7.4 5.0 - 12.0 %    Eosinophil Rel % 4.8 0.3 - 6.2 %    Basophil Rel % 1.3 0.0 - 1.5 %    Neutrophils Absolute 4.27 1.70 - 7.00 10*3/mm3    Lymphocytes Absolute 1.73 0.70 - 3.10 10*3/mm3    Monocytes Absolute 0.52 0.10 - 0.90 10*3/mm3    Eosinophils Absolute 0.34 0.00 - 0.40 10*3/mm3    Basophils Absolute 0.09 0.00 - 0.20 10*3/mm3    Immature Granulocyte Rel % 1.0 (H)  0.0 - 0.5 %    Immature Grans Absolute 0.07 (H) 0.00 - 0.05 10*3/mm3    nRBC 0.0 0.0 - 0.2 /100 WBC     Sees DR Daily CKD stage 3; he postulates from renal stones    Quit smoking 6 years ago  D/w DR Garcia and looked at xray with me and nothing acute  Refer cardio and ? If need stress test; can consider pulm if neg workup  ER if happens again  EKG ok  The following portions of the patient's history were reviewed and updated as appropriate: allergies, current medications, past family history, past medical history, past social history, past surgical history and problem list.    Review of Systems   Constitutional: Negative for activity change, appetite change and unexpected weight change.   HENT: Negative for nosebleeds and trouble swallowing.    Eyes: Negative for pain and visual disturbance.   Respiratory: Negative for chest tightness, shortness of breath and wheezing.    Cardiovascular: Negative for chest pain and palpitations.   Gastrointestinal: Negative for abdominal pain and blood in stool.   Endocrine: Negative.    Genitourinary: Negative for difficulty urinating and hematuria.   Musculoskeletal: Negative for joint swelling.   Skin: Negative for color change and rash.   Allergic/Immunologic: Negative.    Neurological: Negative for syncope and speech difficulty.   Hematological: Negative for adenopathy.   Psychiatric/Behavioral: Negative for agitation and confusion.   All other systems reviewed and are negative.      Objective   Physical Exam   Constitutional: She is oriented to person, place, and time. She appears well-developed and well-nourished. No distress.   HENT:   Head: Normocephalic and atraumatic.   Eyes: Conjunctivae and EOM are normal. Pupils are equal, round, and reactive to light. Right eye exhibits no discharge. Left eye exhibits no discharge. No scleral icterus.   Neck: Normal range of motion. Neck supple. No tracheal deviation present. No thyromegaly present.   Cardiovascular: Normal rate,  regular rhythm, normal heart sounds, intact distal pulses and normal pulses. Exam reveals no gallop.   No murmur heard.  Did have some sinus tachycardia but EKG fine   Pulmonary/Chest: Effort normal and breath sounds normal. No respiratory distress. She has no wheezes. She has no rales.   Musculoskeletal: Normal range of motion.   Neurological: She is alert and oriented to person, place, and time. She exhibits normal muscle tone. Coordination normal.   Skin: Skin is warm. No rash noted. No erythema. No pallor.   Psychiatric: She has a normal mood and affect. Her behavior is normal. Judgment and thought content normal.   Nursing note and vitals reviewed.      Assessment/Plan   Problems Addressed this Visit     None      Visit Diagnoses     Shortness of breath    -  Primary    Relevant Orders    XR Chest PA & Lateral (In Office)    Pedal edema              D/w DR Garcia and looked at xray with me and nothing acute  Refer cardio and ? If need stress test; can consider pulm if neg workup  ER if happens again  EKG ok

## 2020-01-15 RX ORDER — FOLIC ACID 1 MG/1
TABLET ORAL
Qty: 90 TABLET | Refills: 0 | Status: SHIPPED | OUTPATIENT
Start: 2020-01-15 | End: 2020-05-13

## 2020-02-24 ENCOUNTER — OFFICE VISIT (OUTPATIENT)
Dept: FAMILY MEDICINE CLINIC | Facility: CLINIC | Age: 70
End: 2020-02-24

## 2020-02-24 VITALS
HEIGHT: 66 IN | OXYGEN SATURATION: 98 % | SYSTOLIC BLOOD PRESSURE: 110 MMHG | DIASTOLIC BLOOD PRESSURE: 74 MMHG | BODY MASS INDEX: 32.47 KG/M2 | RESPIRATION RATE: 16 BRPM | TEMPERATURE: 98.4 F | WEIGHT: 202 LBS | HEART RATE: 76 BPM

## 2020-02-24 DIAGNOSIS — N18.4 CKD (CHRONIC KIDNEY DISEASE) STAGE 4, GFR 15-29 ML/MIN (HCC): ICD-10-CM

## 2020-02-24 DIAGNOSIS — E53.8 FOLIC ACID DEFICIENCY: ICD-10-CM

## 2020-02-24 DIAGNOSIS — E55.9 VITAMIN D DEFICIENCY: ICD-10-CM

## 2020-02-24 DIAGNOSIS — R82.90 ABNORMAL URINE ODOR: Primary | ICD-10-CM

## 2020-02-24 DIAGNOSIS — I47.1 PSVT (PAROXYSMAL SUPRAVENTRICULAR TACHYCARDIA) (HCC): ICD-10-CM

## 2020-02-24 DIAGNOSIS — R73.01 IMPAIRED FASTING GLUCOSE: ICD-10-CM

## 2020-02-24 DIAGNOSIS — E53.8 LOW SERUM VITAMIN B12: ICD-10-CM

## 2020-02-24 DIAGNOSIS — E78.2 MIXED HYPERLIPIDEMIA: ICD-10-CM

## 2020-02-24 DIAGNOSIS — I10 ESSENTIAL HYPERTENSION: ICD-10-CM

## 2020-02-24 DIAGNOSIS — E03.9 HYPOTHYROIDISM, ACQUIRED: ICD-10-CM

## 2020-02-24 PROBLEM — N20.0 KIDNEY STONE: Status: ACTIVE | Noted: 2019-09-05

## 2020-02-24 PROBLEM — N17.9 ACUTE KIDNEY FAILURE (HCC): Status: ACTIVE | Noted: 2019-09-05

## 2020-02-24 PROBLEM — N13.30 HYDRONEPHROSIS: Status: ACTIVE | Noted: 2019-09-05

## 2020-02-24 LAB
BILIRUB BLD-MCNC: NEGATIVE MG/DL
CLARITY, POC: CLEAR
COLOR UR: YELLOW
GLUCOSE UR STRIP-MCNC: NEGATIVE MG/DL
KETONES UR QL: NEGATIVE
LEUKOCYTE EST, POC: ABNORMAL
NITRITE UR-MCNC: NEGATIVE MG/ML
PH UR: 6 [PH] (ref 5–8)
PROT UR STRIP-MCNC: ABNORMAL MG/DL
RBC # UR STRIP: ABNORMAL /UL
SP GR UR: 1.02 (ref 1–1.03)
UROBILINOGEN UR QL: NORMAL

## 2020-02-24 PROCEDURE — 81003 URINALYSIS AUTO W/O SCOPE: CPT | Performed by: PHYSICIAN ASSISTANT

## 2020-02-24 PROCEDURE — 99214 OFFICE O/P EST MOD 30 MIN: CPT | Performed by: PHYSICIAN ASSISTANT

## 2020-02-24 PROCEDURE — G0439 PPPS, SUBSEQ VISIT: HCPCS | Performed by: PHYSICIAN ASSISTANT

## 2020-02-24 RX ORDER — METOPROLOL SUCCINATE 25 MG/1
25 TABLET, EXTENDED RELEASE ORAL DAILY
Qty: 90 TABLET | Refills: 1 | Status: SHIPPED | OUTPATIENT
Start: 2020-02-24 | End: 2021-02-27

## 2020-02-24 RX ORDER — CEPHALEXIN 500 MG/1
500 CAPSULE ORAL 2 TIMES DAILY
Qty: 14 CAPSULE | Refills: 0 | Status: SHIPPED | OUTPATIENT
Start: 2020-02-24 | End: 2020-06-04 | Stop reason: ALTCHOICE

## 2020-02-24 RX ORDER — AMLODIPINE BESYLATE 5 MG/1
5 TABLET ORAL DAILY
Qty: 90 TABLET | Refills: 1 | Status: SHIPPED | OUTPATIENT
Start: 2020-02-24 | End: 2021-01-21

## 2020-02-24 NOTE — PROGRESS NOTES
"Subjective   Leonard Best is a 69 y.o. female.     History of Present Illness    Since the last visit, she has overall felt fairly well.  She has Essential Hypertension and well controlled on current medication, Impaired fasting glucose and will monitor labs to watch for DMII, Hypothyroidism and must update labs to continue treatment, Vitamin D deficiency and will update labs for continued management and Mixed hyperlipidemia and we need to update labs to decide on treatment dose continuation.  she has been compliant with current medications have reviewed them.  The patient denies medication side effects.  Will refill medications. /74 (BP Location: Left arm, Patient Position: Sitting, Cuff Size: Large Adult)   Pulse 76   Temp 98.4 °F (36.9 °C) (Oral)   Resp 16   Ht 167.6 cm (66\")   Wt 91.6 kg (202 lb)   LMP  (LMP Unknown)   SpO2 98%   BMI 32.60 kg/m²  Hx PVST and overdue for 1 year follow-up with Dr. Woods.  She does have odor to her urine and I will have her do a urine POCT  We will send urine for culture even if the dipsticks negative. Seeing Dr LOS Saldivar for nephrology.  He thinks her potassium was elevated and renal functions elevated from her stress from kidney stones I did take her off lisinopril last July labs.  She is following up with Dr. Saldivar in June  Sees urologist also    No irregular heartbeat  Difficult to exercise due to back pain and suggest swimming or stationary bike.  Results for orders placed or performed in visit on 02/24/20   POC Urinalysis Dipstick, Automated   Result Value Ref Range    Color Yellow Yellow, Straw, Dark Yellow, Margie    Clarity, UA Clear Clear    Specific Gravity  1.025 1.005 - 1.030    pH, Urine 6.0 5.0 - 8.0    Leukocytes Small (1+) (A) Negative    Nitrite, UA Negative Negative    Protein,  mg/dL (A) Negative mg/dL    Glucose, UA Negative Negative, 1000 mg/dL (3+) mg/dL    Ketones, UA Negative Negative    Urobilinogen, UA Normal Normal    Bilirubin " Negative Negative    Blood, UA Small (A) Negative   Declines mammogram and DEXA  Had nodule thyroid removed  I have her on Crestor 20 mg but only doing a half a tablet due to renal functions.  Need to get follow-up labs and see if we can go up to 20 mg.  I also started her on thyroid medication and need follow-up labs to make sure her dose is appropriate.  Am asking her to follow-up with Dr. Woods for the PSVT  Also make sure to follow-up with her folic acid and B12 and I do have her taking that  She does have history low vitamin D and wants to go back on the once a week  The following portions of the patient's history were reviewed and updated as appropriate: allergies, current medications, past family history, past medical history, past social history, past surgical history and problem list.    Review of Systems   Musculoskeletal: Positive for back pain.       Objective   Physical Exam   Constitutional: She is oriented to person, place, and time. She appears well-developed and well-nourished. No distress.   HENT:   Head: Normocephalic and atraumatic.   Eyes: Pupils are equal, round, and reactive to light. Conjunctivae and EOM are normal. Right eye exhibits no discharge. Left eye exhibits no discharge. No scleral icterus.   Neck: Normal range of motion. Neck supple. No tracheal deviation present. No thyromegaly present.   Cardiovascular: Normal rate, regular rhythm, normal heart sounds, intact distal pulses and normal pulses. Exam reveals no gallop.   No murmur heard.  Pulmonary/Chest: Effort normal and breath sounds normal. No respiratory distress. She has no wheezes. She has no rales.   Musculoskeletal: Normal range of motion.   Neurological: She is alert and oriented to person, place, and time. She exhibits normal muscle tone. Coordination normal.   Skin: Skin is warm. No rash noted. No erythema. No pallor.   Psychiatric: She has a normal mood and affect. Her behavior is normal. Judgment and thought content  normal.   Nursing note and vitals reviewed.      Assessment/Plan   Problems Addressed this Visit        Cardiovascular and Mediastinum    Hypertension    Relevant Medications    metoprolol succinate XL (TOPROL-XL) 25 MG 24 hr tablet    amLODIPine (NORVASC) 5 MG tablet    Other Relevant Orders    Comprehensive metabolic panel    Lipid panel    CBC and Differential    TSH    Hemoglobin A1c    T3, Free    T4, Free    Vitamin B12    Folate    Vitamin D 25 Hydroxy    Ambulatory Referral to Cardiology    Hyperlipidemia    Relevant Orders    Comprehensive metabolic panel    Lipid panel    CBC and Differential    TSH    Hemoglobin A1c    T3, Free    T4, Free    Vitamin B12    Folate    Vitamin D 25 Hydroxy    Ambulatory Referral to Cardiology    PSVT (paroxysmal supraventricular tachycardia) (CMS/[facility])    Relevant Medications    metoprolol succinate XL (TOPROL-XL) 25 MG 24 hr tablet    amLODIPine (NORVASC) 5 MG tablet    Other Relevant Orders    Comprehensive metabolic panel    Lipid panel    CBC and Differential    TSH    Hemoglobin A1c    T3, Free    T4, Free    Vitamin B12    Folate    Vitamin D 25 Hydroxy    Ambulatory Referral to Cardiology       Digestive    Vitamin D deficiency    Relevant Orders    Comprehensive metabolic panel    Lipid panel    CBC and Differential    TSH    Hemoglobin A1c    T3, Free    T4, Free    Vitamin B12    Folate    Vitamin D 25 Hydroxy    Ambulatory Referral to Cardiology    Folic acid deficiency    Relevant Orders    Comprehensive metabolic panel    Lipid panel    CBC and Differential    TSH    Hemoglobin A1c    T3, Free    T4, Free    Vitamin B12    Folate    Vitamin D 25 Hydroxy    Ambulatory Referral to Cardiology       Endocrine    Hypothyroidism, acquired    Relevant Medications    metoprolol succinate XL (TOPROL-XL) 25 MG 24 hr tablet    Other Relevant Orders    Comprehensive metabolic panel    Lipid panel    CBC and Differential    TSH    Hemoglobin A1c    T3, Free    T4, Free     Vitamin B12    Folate    Vitamin D 25 Hydroxy    Ambulatory Referral to Cardiology       Genitourinary    CKD (chronic kidney disease) stage 4, GFR 15-29 ml/min (CMS/[facility])       Other    Low serum vitamin B12    Relevant Orders    Comprehensive metabolic panel    Lipid panel    CBC and Differential    TSH    Hemoglobin A1c    T3, Free    T4, Free    Vitamin B12    Folate    Vitamin D 25 Hydroxy    Ambulatory Referral to Cardiology      Other Visit Diagnoses     Abnormal urine odor    -  Primary    Relevant Orders    POC Urinalysis Dipstick, Automated (Completed)    Urinalysis With Microscopic - Urine, Clean Catch    Urine Culture - Urine, Urine, Clean Catch    Comprehensive metabolic panel    Lipid panel    CBC and Differential    TSH    Hemoglobin A1c    T3, Free    T4, Free    Vitamin B12    Folate    Vitamin D 25 Hydroxy    Ambulatory Referral to Cardiology    Impaired fasting glucose        Relevant Orders    Comprehensive metabolic panel    Lipid panel    CBC and Differential    TSH    Hemoglobin A1c    T3, Free    T4, Free    Vitamin B12    Folate    Vitamin D 25 Hydroxy    Ambulatory Referral to Cardiology          Plan, Leonard Best, was seen today.  she was seen for HTN and continue medication, Imparied fasting glucose and plan follow up labs, diet, and exercise, Hyperlipidemia and will continue current medication, Hypothyroidism and will need to update labs for continued treatment and Vitamin D deficiency and will update labs .  Check urine  F/u folic acid and B12 levels  Due for cardiology 1 year follow-up and put order in  Note that she is taking half a tab of Crestor and will see if can go up to a full tab after we get labs back  We will add on her UTI in addition to the above today and start her on Keflex for 7 days

## 2020-02-24 NOTE — PROGRESS NOTES
The ABCs of the Annual Wellness Visit  Subsequent Medicare Wellness Visit    Chief Complaint   Patient presents with   • Medicare Wellness-subsequent       Subjective   History of Present Illness:  Leonard Best is a 69 y.o. female who presents for a Subsequent Medicare Wellness Visit.    HEALTH RISK ASSESSMENT    Recent Hospitalizations:  No hospitalization(s) within the last year.    Current Medical Providers:  Patient Care Team:  Felicia Gloria PA-C as PCP - General  Felicia Gloria PA-C as PCP - Family Medicine  Felicia Gloria PA-C as PCP - Claims Attributed  Kvng Saldivar MD as Consulting Physician (Nephrology)    Smoking Status:  Social History     Tobacco Use   Smoking Status Former Smoker   • Packs/day: 1.00   • Years: 20.00   • Pack years: 20.00   • Types: Cigarettes   • Last attempt to quit: 2015   • Years since quittin.9   Smokeless Tobacco Never Used   Tobacco Comment    CAFFEINE USE       Alcohol Consumption:  Social History     Substance and Sexual Activity   Alcohol Use Yes   • Alcohol/week: 1.0 standard drinks   • Types: 1 Glasses of wine per week    Comment: OCCASIONAL       Depression Screen:   PHQ-2/PHQ-9 Depression Screening 2020   Little interest or pleasure in doing things 0   Feeling down, depressed, or hopeless 0   Total Score 0       Fall Risk Screen:  STEADI Fall Risk Assessment was completed, and patient is at LOW risk for falls.Assessment completed on:2020    Health Habits and Functional and Cognitive Screening:  Functional & Cognitive Status 2020   Do you have difficulty preparing food and eating? No   Do you have difficulty bathing yourself, getting dressed or grooming yourself? No   Do you have difficulty using the toilet? No   Do you have difficulty moving around from place to place? No   Do you have trouble with steps or getting out of a bed or a chair? No   Current Diet Frequent Junk Food   Dental Exam Up to date   Eye Exam Up to date   Exercise (times  per week) 0 times per week   Current Exercise Activities Include None   Do you need help using the phone?  No   Are you deaf or do you have serious difficulty hearing?  No   Do you need help with transportation? No   Do you need help shopping? No   Do you need help preparing meals?  No   Do you need help with housework?  No   Do you need help with laundry? No   Do you need help taking your medications? No   Do you need help managing money? No   Do you ever drive or ride in a car without wearing a seat belt? No   Have you felt unusual stress, anger or loneliness in the last month? No   Who do you live with? Spouse   If you need help, do you have trouble finding someone available to you? No   Have you been bothered in the last four weeks by sexual problems? No   Do you have difficulty concentrating, remembering or making decisions? No         Does the patient have evidence of cognitive impairment? No    Asprin use counseling:Does not need ASA (and currently is not on it)    Age-appropriate Screening Schedule:  Refer to the list below for future screening recommendations based on patient's age, sex and/or medical conditions. Orders for these recommended tests are listed in the plan section. The patient has been provided with a written plan.    Health Maintenance   Topic Date Due   • DXA SCAN  07/11/2018   • ZOSTER VACCINE (2 of 2) 07/27/2020 (Originally 9/5/2016)   • MAMMOGRAM  02/24/2022 (Originally 2/6/2019)   • LIPID PANEL  07/19/2020   • COLONOSCOPY  07/11/2026   • TDAP/TD VACCINES (2 - Td) 07/11/2026   • PNEUMOCOCCAL VACCINE (65+ HIGH RISK)  Completed   • INFLUENZA VACCINE  Discontinued          The following portions of the patient's history were reviewed and updated as appropriate: allergies, current medications, past family history, past medical history, past social history, past surgical history and problem list.    Outpatient Medications Prior to Visit   Medication Sig Dispense Refill   • amLODIPine (NORVASC)  "5 MG tablet Take 1 tablet by mouth Daily. For BP 90 tablet 1   • Cyanocobalamin (VITAMIN B-12) 1000 MCG sublingual tablet Place 1,000 mcg under the tongue Daily. One SL daily 90 each 3   • folic acid (FOLVITE) 1 MG tablet TAKE 1 TABLET BY MOUTH ONCE DAILY 90 tablet 0   • levothyroxine (SYNTHROID, LEVOTHROID) 25 MCG tablet Take 1 tablet by mouth Daily. One PO daily before breakfast for thyroid 30 tablet 11   • metoprolol succinate XL (TOPROL-XL) 25 MG 24 hr tablet Take 1 tablet by mouth Daily. For heart and BP 90 tablet 1   • rosuvastatin (CRESTOR) 20 MG tablet Go down to 1/2 tab PO daily For cholesterol 30 tablet 11     No facility-administered medications prior to visit.        Patient Active Problem List   Diagnosis   • Hypertension   • Right knee pain   • Hyperlipidemia   • Osteoarthritis, multiple sites   • Primary osteoarthritis of right knee   • Vitamin D deficiency   • PSVT (paroxysmal supraventricular tachycardia) (CMS/HCC)   • Hypothyroidism, acquired   • Folic acid deficiency   • Low serum vitamin B12   • Acute kidney failure (CMS/HCC)   • Hydronephrosis   • Kidney stone       Advanced Care Planning:  ACP discussion was held with the patient during this visit. Patient has an advance directive, copy requested.    Review of Systems    Compared to one year ago, the patient feels her physical health is the same.  Compared to one year ago, the patient feels her mental health is the same.    Reviewed chart for potential of high risk medication in the elderly: yes  Reviewed chart for potential of harmful drug interactions in the elderly:yes    Objective         Vitals:    02/24/20 1453   BP: 110/74   BP Location: Left arm   Patient Position: Sitting   Cuff Size: Large Adult   Pulse: 76   Resp: 16   Temp: 98.4 °F (36.9 °C)   TempSrc: Oral   SpO2: 98%   Weight: 91.6 kg (202 lb)   Height: 167.6 cm (66\")   PainSc: 0-No pain       Body mass index is 32.6 kg/m².  Discussed the patient's BMI with her. The BMI is above " average; BMI management plan is completed.    Physical Exam          Assessment/Plan   Medicare Risks and Personalized Health Plan  CMS Preventative Services Quick Reference  declines DEXA and mammo.     The above risks/problems have been discussed with the patient.  Pertinent information has been shared with the patient in the After Visit Summary.  Follow up plans and orders are seen below in the Assessment/Plan Section.    There are no diagnoses linked to this encounter.  Follow Up:  No follow-ups on file.     An After Visit Summary and PPPS were given to the patient.

## 2020-02-27 LAB
APPEARANCE UR: CLEAR
BACTERIA #/AREA URNS HPF: ABNORMAL /[HPF]
BACTERIA UR CULT: ABNORMAL
BACTERIA UR CULT: ABNORMAL
BILIRUB UR QL STRIP: NEGATIVE
COLOR UR: YELLOW
EPI CELLS #/AREA URNS HPF: ABNORMAL /HPF (ref 0–10)
GLUCOSE UR QL: NEGATIVE
HGB UR QL STRIP: ABNORMAL
KETONES UR QL STRIP: NEGATIVE
LEUKOCYTE ESTERASE UR QL STRIP: ABNORMAL
MICRO URNS: ABNORMAL
MUCOUS THREADS URNS QL MICRO: PRESENT
NITRITE UR QL STRIP: NEGATIVE
OTHER ANTIBIOTIC SUSC ISLT: ABNORMAL
PH UR STRIP: 6 [PH] (ref 5–7.5)
PROT UR QL STRIP: ABNORMAL
RBC #/AREA URNS HPF: ABNORMAL /HPF (ref 0–2)
SP GR UR: 1.02 (ref 1–1.03)
UROBILINOGEN UR STRIP-MCNC: 0.2 MG/DL (ref 0.2–1)
WBC #/AREA URNS HPF: >30 /HPF (ref 0–5)

## 2020-03-14 LAB
25(OH)D3+25(OH)D2 SERPL-MCNC: 34.1 NG/ML (ref 30–100)
ALBUMIN SERPL-MCNC: 4.1 G/DL (ref 3.5–5.2)
ALBUMIN/GLOB SERPL: 1.6 G/DL
ALP SERPL-CCNC: 139 U/L (ref 39–117)
ALT SERPL-CCNC: 12 U/L (ref 1–33)
AST SERPL-CCNC: 11 U/L (ref 1–32)
BASOPHILS # BLD AUTO: 0.11 10*3/MM3 (ref 0–0.2)
BASOPHILS NFR BLD AUTO: 1.1 % (ref 0–1.5)
BILIRUB SERPL-MCNC: 0.2 MG/DL (ref 0.2–1.2)
BUN SERPL-MCNC: 15 MG/DL (ref 8–23)
BUN/CREAT SERPL: 10.3 (ref 7–25)
CALCIUM SERPL-MCNC: 9.6 MG/DL (ref 8.6–10.5)
CHLORIDE SERPL-SCNC: 105 MMOL/L (ref 98–107)
CHOLEST SERPL-MCNC: 130 MG/DL (ref 0–200)
CO2 SERPL-SCNC: 23.3 MMOL/L (ref 22–29)
CREAT SERPL-MCNC: 1.46 MG/DL (ref 0.57–1)
EOSINOPHIL # BLD AUTO: 0.42 10*3/MM3 (ref 0–0.4)
EOSINOPHIL NFR BLD AUTO: 4.1 % (ref 0.3–6.2)
ERYTHROCYTE [DISTWIDTH] IN BLOOD BY AUTOMATED COUNT: 13.1 % (ref 12.3–15.4)
FOLATE SERPL-MCNC: >20 NG/ML (ref 4.78–24.2)
GLOBULIN SER CALC-MCNC: 2.6 GM/DL
GLUCOSE SERPL-MCNC: 122 MG/DL (ref 65–99)
HBA1C MFR BLD: 5.7 % (ref 4.8–5.6)
HCT VFR BLD AUTO: 39.9 % (ref 34–46.6)
HDLC SERPL-MCNC: 36 MG/DL (ref 40–60)
HGB BLD-MCNC: 13.1 G/DL (ref 12–15.9)
IMM GRANULOCYTES # BLD AUTO: 0.04 10*3/MM3 (ref 0–0.05)
IMM GRANULOCYTES NFR BLD AUTO: 0.4 % (ref 0–0.5)
LDLC SERPL CALC-MCNC: 42 MG/DL (ref 0–100)
LYMPHOCYTES # BLD AUTO: 2.79 10*3/MM3 (ref 0.7–3.1)
LYMPHOCYTES NFR BLD AUTO: 27.4 % (ref 19.6–45.3)
MCH RBC QN AUTO: 29.6 PG (ref 26.6–33)
MCHC RBC AUTO-ENTMCNC: 32.8 G/DL (ref 31.5–35.7)
MCV RBC AUTO: 90.3 FL (ref 79–97)
MONOCYTES # BLD AUTO: 0.8 10*3/MM3 (ref 0.1–0.9)
MONOCYTES NFR BLD AUTO: 7.9 % (ref 5–12)
NEUTROPHILS # BLD AUTO: 6.01 10*3/MM3 (ref 1.7–7)
NEUTROPHILS NFR BLD AUTO: 59.1 % (ref 42.7–76)
NRBC BLD AUTO-RTO: 0 /100 WBC (ref 0–0.2)
PLATELET # BLD AUTO: 358 10*3/MM3 (ref 140–450)
POTASSIUM SERPL-SCNC: 4.1 MMOL/L (ref 3.5–5.2)
PROT SERPL-MCNC: 6.7 G/DL (ref 6–8.5)
RBC # BLD AUTO: 4.42 10*6/MM3 (ref 3.77–5.28)
SODIUM SERPL-SCNC: 139 MMOL/L (ref 136–145)
T3FREE SERPL-MCNC: 3.2 PG/ML (ref 2–4.4)
T4 FREE SERPL-MCNC: 1.38 NG/DL (ref 0.93–1.7)
TRIGL SERPL-MCNC: 259 MG/DL (ref 0–150)
TSH SERPL DL<=0.005 MIU/L-ACNC: 0.15 UIU/ML (ref 0.27–4.2)
VIT B12 SERPL-MCNC: >2000 PG/ML (ref 211–946)
VLDLC SERPL CALC-MCNC: 51.8 MG/DL
WBC # BLD AUTO: 10.17 10*3/MM3 (ref 3.4–10.8)

## 2020-03-14 RX ORDER — LEVOTHYROXINE SODIUM 0.03 MG/1
25 TABLET ORAL DAILY
Qty: 30 TABLET | Refills: 11 | Status: SHIPPED | OUTPATIENT
Start: 2020-03-14 | End: 2021-04-01

## 2020-04-25 ENCOUNTER — RESULTS ENCOUNTER (OUTPATIENT)
Dept: FAMILY MEDICINE CLINIC | Facility: CLINIC | Age: 70
End: 2020-04-25

## 2020-04-25 DIAGNOSIS — E03.9 HYPOTHYROIDISM, ACQUIRED: ICD-10-CM

## 2020-05-13 RX ORDER — FOLIC ACID 1 MG/1
TABLET ORAL
Qty: 90 TABLET | Refills: 0 | Status: SHIPPED | OUTPATIENT
Start: 2020-05-13 | End: 2020-08-24

## 2020-06-04 ENCOUNTER — OFFICE VISIT (OUTPATIENT)
Dept: CARDIOLOGY | Facility: CLINIC | Age: 70
End: 2020-06-04

## 2020-06-04 VITALS
WEIGHT: 202 LBS | HEIGHT: 66 IN | OXYGEN SATURATION: 96 % | SYSTOLIC BLOOD PRESSURE: 132 MMHG | DIASTOLIC BLOOD PRESSURE: 84 MMHG | BODY MASS INDEX: 32.47 KG/M2 | HEART RATE: 77 BPM

## 2020-06-04 DIAGNOSIS — I47.1 PSVT (PAROXYSMAL SUPRAVENTRICULAR TACHYCARDIA) (HCC): Primary | ICD-10-CM

## 2020-06-04 DIAGNOSIS — I10 ESSENTIAL HYPERTENSION: ICD-10-CM

## 2020-06-04 PROCEDURE — 99213 OFFICE O/P EST LOW 20 MIN: CPT | Performed by: INTERNAL MEDICINE

## 2020-06-04 NOTE — PROGRESS NOTES
Subjective:     Encounter Date:06/04/2020      Patient ID: Leonard Best is a 69 y.o. female.    Chief Complaint: SVT and hypertension  History of Present Illness    69-year-old female who presents today for reevaluation.  Patient had a kidney stone surgery in April of last year.  From a heart standpoint she has been doing great.  I did warn her about the beta-blocker putting some weight on which it has.  She is had no signs of SVT her blood pressures been good and from a cardiovascular standpoint she says she is feeling great.    Review of Systems   All other systems reviewed and are negative.      Procedures       Objective:     Physical Exam   Constitutional: She is oriented to person, place, and time. She appears well-developed.   HENT:   Head: Normocephalic.   Eyes: Conjunctivae are normal.   Neck: Normal range of motion.   Cardiovascular: Normal rate, regular rhythm and normal heart sounds.   Pulmonary/Chest: Breath sounds normal.   Abdominal: Soft. Bowel sounds are normal.   Musculoskeletal: Normal range of motion. She exhibits no edema.   Neurological: She is alert and oriented to person, place, and time.   Skin: Skin is warm and dry.   Psychiatric: She has a normal mood and affect. Her behavior is normal.   Vitals reviewed.      Lab Review:       Assessment:          Diagnosis Plan   1. PSVT (paroxysmal supraventricular tachycardia) (CMS/HCC)     2. Essential hypertension            Plan:         1.  SVT.  Is been stable.  I told her to decrease her Toprol down to half a tablet a day see if she can remain stable on that.  Obviously the less medicine we get away with a better.  2.  Hypertension blood pressures good  3.  Follow-up 1 year sooner if issues.

## 2020-08-24 RX ORDER — FOLIC ACID 1 MG/1
TABLET ORAL
Qty: 90 TABLET | Refills: 0 | Status: SHIPPED | OUTPATIENT
Start: 2020-08-24 | End: 2021-01-21

## 2020-08-24 RX ORDER — MAGNESIUM 200 MG
TABLET ORAL
Qty: 60 EACH | Refills: 0 | Status: SHIPPED | OUTPATIENT
Start: 2020-08-24 | End: 2020-08-31

## 2020-08-31 RX ORDER — MAGNESIUM 200 MG
TABLET ORAL
Qty: 30 EACH | Refills: 0 | Status: SHIPPED | OUTPATIENT
Start: 2020-08-31 | End: 2021-03-04

## 2020-11-17 RX ORDER — ROSUVASTATIN CALCIUM 20 MG/1
TABLET, COATED ORAL
Qty: 30 TABLET | Refills: 0 | Status: SHIPPED | OUTPATIENT
Start: 2020-11-17 | End: 2021-01-11

## 2020-12-02 ENCOUNTER — APPOINTMENT (OUTPATIENT)
Dept: GENERAL RADIOLOGY | Facility: HOSPITAL | Age: 70
End: 2020-12-02

## 2020-12-02 ENCOUNTER — HOSPITAL ENCOUNTER (INPATIENT)
Facility: HOSPITAL | Age: 70
LOS: 4 days | Discharge: HOME | End: 2020-12-06
Attending: EMERGENCY MEDICINE | Admitting: INTERNAL MEDICINE

## 2020-12-02 DIAGNOSIS — U07.1 PNEUMONIA DUE TO COVID-19 VIRUS: Primary | ICD-10-CM

## 2020-12-02 DIAGNOSIS — G44.211 INTRACTABLE EPISODIC TENSION-TYPE HEADACHE: ICD-10-CM

## 2020-12-02 DIAGNOSIS — J96.01 ACUTE HYPOXEMIC RESPIRATORY FAILURE (HCC): ICD-10-CM

## 2020-12-02 DIAGNOSIS — J12.82 PNEUMONIA DUE TO COVID-19 VIRUS: Primary | ICD-10-CM

## 2020-12-02 PROBLEM — A41.9 SEPSIS, UNSPECIFIED ORGANISM: Status: ACTIVE | Noted: 2020-12-02

## 2020-12-02 PROBLEM — R11.2 NAUSEA & VOMITING: Status: ACTIVE | Noted: 2020-12-02

## 2020-12-02 PROBLEM — I50.32 CHRONIC DIASTOLIC CHF (CONGESTIVE HEART FAILURE) (HCC): Chronic | Status: ACTIVE | Noted: 2020-12-02

## 2020-12-02 LAB
ALBUMIN SERPL-MCNC: 4.2 G/DL (ref 3.5–5.2)
ALBUMIN/GLOB SERPL: 1.1 G/DL
ALP SERPL-CCNC: 148 U/L (ref 39–117)
ALT SERPL W P-5'-P-CCNC: 12 U/L (ref 1–33)
ANION GAP SERPL CALCULATED.3IONS-SCNC: 16.7 MMOL/L (ref 5–15)
ARTERIAL PATENCY WRIST A: POSITIVE
AST SERPL-CCNC: 24 U/L (ref 1–32)
ATMOSPHERIC PRESS: 760.6 MMHG
BASE EXCESS BLDA CALC-SCNC: -7.1 MMOL/L (ref 0–2)
BASOPHILS # BLD AUTO: 0.03 10*3/MM3 (ref 0–0.2)
BASOPHILS NFR BLD AUTO: 0.2 % (ref 0–1.5)
BDY SITE: ABNORMAL
BILIRUB SERPL-MCNC: 0.5 MG/DL (ref 0–1.2)
BUN SERPL-MCNC: 33 MG/DL (ref 8–23)
BUN/CREAT SERPL: 20.5 (ref 7–25)
CALCIUM SPEC-SCNC: 9.9 MG/DL (ref 8.6–10.5)
CHLORIDE SERPL-SCNC: 99 MMOL/L (ref 98–107)
CO2 SERPL-SCNC: 18.3 MMOL/L (ref 22–29)
CREAT SERPL-MCNC: 1.61 MG/DL (ref 0.57–1)
CRP SERPL-MCNC: 28.19 MG/DL (ref 0–0.5)
D-LACTATE SERPL-SCNC: 1.9 MMOL/L (ref 0.5–2)
DEPRECATED RDW RBC AUTO: 46.1 FL (ref 37–54)
EOSINOPHIL # BLD AUTO: 0 10*3/MM3 (ref 0–0.4)
EOSINOPHIL NFR BLD AUTO: 0 % (ref 0.3–6.2)
ERYTHROCYTE [DISTWIDTH] IN BLOOD BY AUTOMATED COUNT: 14.4 % (ref 12.3–15.4)
FERRITIN SERPL-MCNC: 436 NG/ML (ref 13–150)
GAS FLOW AIRWAY: 1 LPM
GFR SERPL CREATININE-BSD FRML MDRD: 32 ML/MIN/1.73
GLOBULIN UR ELPH-MCNC: 3.9 GM/DL
GLUCOSE SERPL-MCNC: 109 MG/DL (ref 65–99)
HCO3 BLDA-SCNC: 16.2 MMOL/L (ref 22–28)
HCT VFR BLD AUTO: 48.5 % (ref 34–46.6)
HGB BLD-MCNC: 16.1 G/DL (ref 12–15.9)
IMM GRANULOCYTES # BLD AUTO: 0.07 10*3/MM3 (ref 0–0.05)
IMM GRANULOCYTES NFR BLD AUTO: 0.6 % (ref 0–0.5)
LDH SERPL-CCNC: 318 U/L (ref 135–214)
LYMPHOCYTES # BLD AUTO: 1.08 10*3/MM3 (ref 0.7–3.1)
LYMPHOCYTES NFR BLD AUTO: 8.9 % (ref 19.6–45.3)
MCH RBC QN AUTO: 29.5 PG (ref 26.6–33)
MCHC RBC AUTO-ENTMCNC: 33.2 G/DL (ref 31.5–35.7)
MCV RBC AUTO: 89 FL (ref 79–97)
MODALITY: ABNORMAL
MONOCYTES # BLD AUTO: 1.09 10*3/MM3 (ref 0.1–0.9)
MONOCYTES NFR BLD AUTO: 9 % (ref 5–12)
NEUTROPHILS NFR BLD AUTO: 81.3 % (ref 42.7–76)
NEUTROPHILS NFR BLD AUTO: 9.82 10*3/MM3 (ref 1.7–7)
NRBC BLD AUTO-RTO: 0 /100 WBC (ref 0–0.2)
PCO2 BLDA: 27.1 MM HG (ref 35–45)
PH BLDA: 7.38 PH UNITS (ref 7.35–7.45)
PLATELET # BLD AUTO: 325 10*3/MM3 (ref 140–450)
PMV BLD AUTO: 10.3 FL (ref 6–12)
PO2 BLDA: 79.5 MM HG (ref 80–100)
POTASSIUM SERPL-SCNC: 4 MMOL/L (ref 3.5–5.2)
PROCALCITONIN SERPL-MCNC: 0.26 NG/ML (ref 0–0.25)
PROT SERPL-MCNC: 8.1 G/DL (ref 6–8.5)
QT INTERVAL: 327 MS
RBC # BLD AUTO: 5.45 10*6/MM3 (ref 3.77–5.28)
SAO2 % BLDCOA: 95.7 % (ref 92–99)
SET MECH RESP RATE: 26
SODIUM SERPL-SCNC: 134 MMOL/L (ref 136–145)
TROPONIN T SERPL-MCNC: <0.01 NG/ML (ref 0–0.03)
WBC # BLD AUTO: 12.09 10*3/MM3 (ref 3.4–10.8)

## 2020-12-02 PROCEDURE — 94799 UNLISTED PULMONARY SVC/PX: CPT

## 2020-12-02 PROCEDURE — 85025 COMPLETE CBC W/AUTO DIFF WBC: CPT | Performed by: EMERGENCY MEDICINE

## 2020-12-02 PROCEDURE — 80053 COMPREHEN METABOLIC PANEL: CPT | Performed by: EMERGENCY MEDICINE

## 2020-12-02 PROCEDURE — 83615 LACTATE (LD) (LDH) ENZYME: CPT | Performed by: EMERGENCY MEDICINE

## 2020-12-02 PROCEDURE — 82803 BLOOD GASES ANY COMBINATION: CPT

## 2020-12-02 PROCEDURE — 25010000002 MORPHINE PER 10 MG: Performed by: EMERGENCY MEDICINE

## 2020-12-02 PROCEDURE — 99285 EMERGENCY DEPT VISIT HI MDM: CPT

## 2020-12-02 PROCEDURE — 93005 ELECTROCARDIOGRAM TRACING: CPT | Performed by: EMERGENCY MEDICINE

## 2020-12-02 PROCEDURE — 71045 X-RAY EXAM CHEST 1 VIEW: CPT

## 2020-12-02 PROCEDURE — 84145 PROCALCITONIN (PCT): CPT | Performed by: EMERGENCY MEDICINE

## 2020-12-02 PROCEDURE — 36415 COLL VENOUS BLD VENIPUNCTURE: CPT

## 2020-12-02 PROCEDURE — 94640 AIRWAY INHALATION TREATMENT: CPT

## 2020-12-02 PROCEDURE — 25010000002 ENOXAPARIN PER 10 MG: Performed by: INTERNAL MEDICINE

## 2020-12-02 PROCEDURE — 93010 ELECTROCARDIOGRAM REPORT: CPT | Performed by: INTERNAL MEDICINE

## 2020-12-02 PROCEDURE — 99284 EMERGENCY DEPT VISIT MOD MDM: CPT

## 2020-12-02 PROCEDURE — 25010000002 ONDANSETRON PER 1 MG: Performed by: EMERGENCY MEDICINE

## 2020-12-02 PROCEDURE — 25010000002 DEXAMETHASONE SODIUM PHOSPHATE 10 MG/ML SOLUTION: Performed by: EMERGENCY MEDICINE

## 2020-12-02 PROCEDURE — 82728 ASSAY OF FERRITIN: CPT | Performed by: EMERGENCY MEDICINE

## 2020-12-02 PROCEDURE — 86140 C-REACTIVE PROTEIN: CPT | Performed by: EMERGENCY MEDICINE

## 2020-12-02 PROCEDURE — 36600 WITHDRAWAL OF ARTERIAL BLOOD: CPT

## 2020-12-02 PROCEDURE — XW033E5 INTRODUCTION OF REMDESIVIR ANTI-INFECTIVE INTO PERIPHERAL VEIN, PERCUTANEOUS APPROACH, NEW TECHNOLOGY GROUP 5: ICD-10-PCS | Performed by: INTERNAL MEDICINE

## 2020-12-02 PROCEDURE — 84484 ASSAY OF TROPONIN QUANT: CPT | Performed by: INTERNAL MEDICINE

## 2020-12-02 PROCEDURE — 83605 ASSAY OF LACTIC ACID: CPT | Performed by: EMERGENCY MEDICINE

## 2020-12-02 RX ORDER — MELATONIN
2000 DAILY
Status: DISCONTINUED | OUTPATIENT
Start: 2020-12-02 | End: 2020-12-03 | Stop reason: SDUPTHER

## 2020-12-02 RX ORDER — DEXAMETHASONE SODIUM PHOSPHATE 10 MG/ML
6 INJECTION, SOLUTION INTRAMUSCULAR; INTRAVENOUS ONCE
Status: COMPLETED | OUTPATIENT
Start: 2020-12-02 | End: 2020-12-02

## 2020-12-02 RX ORDER — NALOXONE HCL 0.4 MG/ML
0.4 VIAL (ML) INJECTION
Status: DISCONTINUED | OUTPATIENT
Start: 2020-12-02 | End: 2020-12-06 | Stop reason: HOSPADM

## 2020-12-02 RX ORDER — MELATONIN
1000 DAILY
Status: DISCONTINUED | OUTPATIENT
Start: 2020-12-02 | End: 2020-12-02

## 2020-12-02 RX ORDER — ACETAMINOPHEN 500 MG
1000 TABLET ORAL ONCE
Status: COMPLETED | OUTPATIENT
Start: 2020-12-02 | End: 2020-12-02

## 2020-12-02 RX ORDER — MORPHINE SULFATE 2 MG/ML
2 INJECTION, SOLUTION INTRAMUSCULAR; INTRAVENOUS EVERY 4 HOURS PRN
Status: DISCONTINUED | OUTPATIENT
Start: 2020-12-02 | End: 2020-12-06 | Stop reason: HOSPADM

## 2020-12-02 RX ORDER — HYDROCODONE BITARTRATE AND ACETAMINOPHEN 5; 325 MG/1; MG/1
1 TABLET ORAL EVERY 6 HOURS PRN
Status: DISCONTINUED | OUTPATIENT
Start: 2020-12-02 | End: 2020-12-06 | Stop reason: HOSPADM

## 2020-12-02 RX ORDER — ACETAMINOPHEN 325 MG/1
650 TABLET ORAL EVERY 4 HOURS PRN
Status: DISCONTINUED | OUTPATIENT
Start: 2020-12-02 | End: 2020-12-06 | Stop reason: HOSPADM

## 2020-12-02 RX ORDER — LEVOTHYROXINE SODIUM 0.03 MG/1
25 TABLET ORAL DAILY
Status: DISCONTINUED | OUTPATIENT
Start: 2020-12-02 | End: 2020-12-02

## 2020-12-02 RX ORDER — FOLIC ACID 1 MG/1
1000 TABLET ORAL DAILY
Status: DISCONTINUED | OUTPATIENT
Start: 2020-12-02 | End: 2020-12-06 | Stop reason: HOSPADM

## 2020-12-02 RX ORDER — BUDESONIDE AND FORMOTEROL FUMARATE DIHYDRATE 160; 4.5 UG/1; UG/1
2 AEROSOL RESPIRATORY (INHALATION)
Status: DISCONTINUED | OUTPATIENT
Start: 2020-12-02 | End: 2020-12-06 | Stop reason: HOSPADM

## 2020-12-02 RX ORDER — NITROGLYCERIN 0.4 MG/1
0.4 TABLET SUBLINGUAL
Status: DISCONTINUED | OUTPATIENT
Start: 2020-12-02 | End: 2020-12-06 | Stop reason: HOSPADM

## 2020-12-02 RX ORDER — MUSCLE RUB CREAM 100; 150 MG/G; MG/G
CREAM TOPICAL
Status: DISCONTINUED | OUTPATIENT
Start: 2020-12-02 | End: 2020-12-06 | Stop reason: HOSPADM

## 2020-12-02 RX ORDER — LEVOTHYROXINE SODIUM 0.03 MG/1
25 TABLET ORAL
Status: DISCONTINUED | OUTPATIENT
Start: 2020-12-03 | End: 2020-12-06 | Stop reason: HOSPADM

## 2020-12-02 RX ORDER — ACETAMINOPHEN 160 MG/5ML
650 SOLUTION ORAL EVERY 4 HOURS PRN
Status: DISCONTINUED | OUTPATIENT
Start: 2020-12-02 | End: 2020-12-06 | Stop reason: HOSPADM

## 2020-12-02 RX ORDER — CALCIUM CARBONATE 200(500)MG
2 TABLET,CHEWABLE ORAL 2 TIMES DAILY PRN
Status: DISCONTINUED | OUTPATIENT
Start: 2020-12-02 | End: 2020-12-06 | Stop reason: HOSPADM

## 2020-12-02 RX ORDER — CHOLECALCIFEROL (VITAMIN D3) 125 MCG
1000 CAPSULE ORAL DAILY
Status: DISCONTINUED | OUTPATIENT
Start: 2020-12-02 | End: 2020-12-06 | Stop reason: HOSPADM

## 2020-12-02 RX ORDER — ONDANSETRON 2 MG/ML
4 INJECTION INTRAMUSCULAR; INTRAVENOUS EVERY 6 HOURS PRN
Status: DISCONTINUED | OUTPATIENT
Start: 2020-12-02 | End: 2020-12-06 | Stop reason: HOSPADM

## 2020-12-02 RX ORDER — GUAIFENESIN AND DEXTROMETHORPHAN HYDROBROMIDE 600; 30 MG/1; MG/1
1 TABLET, EXTENDED RELEASE ORAL 2 TIMES DAILY PRN
Status: DISCONTINUED | OUTPATIENT
Start: 2020-12-02 | End: 2020-12-06 | Stop reason: HOSPADM

## 2020-12-02 RX ORDER — METOPROLOL SUCCINATE 25 MG/1
25 TABLET, EXTENDED RELEASE ORAL
Status: DISCONTINUED | OUTPATIENT
Start: 2020-12-02 | End: 2020-12-06 | Stop reason: HOSPADM

## 2020-12-02 RX ORDER — AMLODIPINE BESYLATE 5 MG/1
5 TABLET ORAL DAILY
Status: DISCONTINUED | OUTPATIENT
Start: 2020-12-03 | End: 2020-12-06 | Stop reason: HOSPADM

## 2020-12-02 RX ORDER — SODIUM CHLORIDE 9 MG/ML
125 INJECTION, SOLUTION INTRAVENOUS CONTINUOUS
Status: DISCONTINUED | OUTPATIENT
Start: 2020-12-02 | End: 2020-12-03

## 2020-12-02 RX ORDER — ALBUTEROL SULFATE 90 UG/1
2 AEROSOL, METERED RESPIRATORY (INHALATION)
Status: DISCONTINUED | OUTPATIENT
Start: 2020-12-02 | End: 2020-12-06 | Stop reason: HOSPADM

## 2020-12-02 RX ORDER — ACETAMINOPHEN 650 MG/1
650 SUPPOSITORY RECTAL EVERY 4 HOURS PRN
Status: DISCONTINUED | OUTPATIENT
Start: 2020-12-02 | End: 2020-12-06 | Stop reason: HOSPADM

## 2020-12-02 RX ORDER — ROSUVASTATIN CALCIUM 20 MG/1
20 TABLET, COATED ORAL DAILY
Status: DISCONTINUED | OUTPATIENT
Start: 2020-12-02 | End: 2020-12-06 | Stop reason: HOSPADM

## 2020-12-02 RX ORDER — ASCORBIC ACID 500 MG
2000 TABLET ORAL DAILY
Status: DISCONTINUED | OUTPATIENT
Start: 2020-12-02 | End: 2020-12-06 | Stop reason: HOSPADM

## 2020-12-02 RX ORDER — ONDANSETRON 2 MG/ML
4 INJECTION INTRAMUSCULAR; INTRAVENOUS ONCE
Status: COMPLETED | OUTPATIENT
Start: 2020-12-02 | End: 2020-12-02

## 2020-12-02 RX ORDER — MORPHINE SULFATE 2 MG/ML
4 INJECTION, SOLUTION INTRAMUSCULAR; INTRAVENOUS ONCE
Status: COMPLETED | OUTPATIENT
Start: 2020-12-02 | End: 2020-12-02

## 2020-12-02 RX ORDER — SODIUM CHLORIDE 0.9 % (FLUSH) 0.9 %
10 SYRINGE (ML) INJECTION AS NEEDED
Status: DISCONTINUED | OUTPATIENT
Start: 2020-12-02 | End: 2020-12-06 | Stop reason: HOSPADM

## 2020-12-02 RX ORDER — ONDANSETRON 4 MG/1
4 TABLET, FILM COATED ORAL EVERY 6 HOURS PRN
Status: DISCONTINUED | OUTPATIENT
Start: 2020-12-02 | End: 2020-12-06 | Stop reason: HOSPADM

## 2020-12-02 RX ORDER — ACETAMINOPHEN 325 MG/1
650 TABLET ORAL EVERY 6 HOURS PRN
Status: DISCONTINUED | OUTPATIENT
Start: 2020-12-02 | End: 2020-12-06 | Stop reason: HOSPADM

## 2020-12-02 RX ADMIN — ENOXAPARIN SODIUM 40 MG: 40 INJECTION SUBCUTANEOUS at 17:07

## 2020-12-02 RX ADMIN — ACETAMINOPHEN 1000 MG: 500 TABLET, FILM COATED ORAL at 12:07

## 2020-12-02 RX ADMIN — Medication 2000 UNITS: at 20:27

## 2020-12-02 RX ADMIN — BUDESONIDE AND FORMOTEROL FUMARATE DIHYDRATE 2 PUFF: 160; 4.5 AEROSOL RESPIRATORY (INHALATION) at 19:22

## 2020-12-02 RX ADMIN — SODIUM CHLORIDE 125 ML/HR: 9 INJECTION, SOLUTION INTRAVENOUS at 12:18

## 2020-12-02 RX ADMIN — SODIUM CHLORIDE 1000 ML: 9 INJECTION, SOLUTION INTRAVENOUS at 15:07

## 2020-12-02 RX ADMIN — REMDESIVIR 200 MG: 100 INJECTION, POWDER, LYOPHILIZED, FOR SOLUTION INTRAVENOUS at 17:07

## 2020-12-02 RX ADMIN — SODIUM CHLORIDE 125 ML/HR: 9 INJECTION, SOLUTION INTRAVENOUS at 21:30

## 2020-12-02 RX ADMIN — DEXAMETHASONE SODIUM PHOSPHATE 6 MG: 10 INJECTION, SOLUTION INTRAMUSCULAR; INTRAVENOUS at 14:53

## 2020-12-02 RX ADMIN — ONDANSETRON 4 MG: 2 INJECTION INTRAMUSCULAR; INTRAVENOUS at 14:50

## 2020-12-02 RX ADMIN — ROSUVASTATIN CALCIUM 20 MG: 20 TABLET, FILM COATED ORAL at 20:27

## 2020-12-02 RX ADMIN — OXYCODONE HYDROCHLORIDE AND ACETAMINOPHEN 2000 MG: 500 TABLET ORAL at 23:32

## 2020-12-02 RX ADMIN — FOLIC ACID 1000 MCG: 1 TABLET ORAL at 20:28

## 2020-12-02 RX ADMIN — Medication 1000 MCG: at 20:28

## 2020-12-02 RX ADMIN — MORPHINE SULFATE 4 MG: 2 INJECTION, SOLUTION INTRAMUSCULAR; INTRAVENOUS at 14:50

## 2020-12-02 NOTE — ED NOTES
Liz Cherry cell [phone]    Spoke to daughter liz, updated on current status, tests ordered as of this time. Asked to call back for update approx 2 hours. Identity and access code used for verification. placn to be admitted for covid. States her  was covid +       Mackenzie Toussaint, RN  12/02/20 1325       Mackenzie Toussaint RN  12/02/20 132

## 2020-12-02 NOTE — ED PROVIDER NOTES
EMERGENCY DEPARTMENT ENCOUNTER    Room Number:  S502/1  Date of encounter:  12/2/2020  PCP: Felicia Gloria, JOSIAH  Historian: Patient      HPI:  Chief Complaint: Headache, shortness of breath, COVID-19 positive  A complete HPI/ROS/PMH/PSH/SH/FH are unobtainable due to: N/A    Context: Leonard Best is a 70 y.o. female who presents to the ED c/o headache and dyspnea on exertion for the past 3 to 4 days.  She was at an [facility] care center earlier today and tested positive for COVID-19 (confirmed via epic).  Headache is severe, dyspnea is moderate-worsened with exertion.  She has had a dry nonproductive cough.  Not much in the way of fevers and chills.  Mild myalgias/arthralgias.  She also reports nausea, vomiting and diarrhea.  She is a former smoker with no prior diagnosis of emphysema/COPD.  She thinks she got COVID-19 from her  who in turn contracted it from his brother.    The patient was placed in a mask in triage, hand hygiene was performed before and after my interaction with the patient.  I wore a mask, safety glasses and gloves during my entire interaction with the patient.    PAST MEDICAL HISTORY  Active Ambulatory Problems     Diagnosis Date Noted   • Hypertension 12/30/2015   • Right knee pain 12/30/2015   • Hyperlipidemia 07/11/2016   • Osteoarthritis, multiple sites 08/18/2010   • Primary osteoarthritis of right knee 08/15/2016   • Vitamin D deficiency 01/09/2018   • PSVT (paroxysmal supraventricular tachycardia) (CMS/[facility]) 02/15/2018   • Hypothyroidism, acquired 07/20/2019   • Folic acid deficiency 07/20/2019   • Low serum vitamin B12 07/20/2019   • Acute kidney failure (CMS/[facility]) 09/05/2019   • Hydronephrosis 09/05/2019   • Kidney stone 09/05/2019   • CKD (chronic kidney disease) stage 4, GFR 15-29 ml/min (CMS/[facility]) 02/24/2020     Resolved Ambulatory Problems     Diagnosis Date Noted   • No Resolved Ambulatory Problems     Past Medical History:   Diagnosis Date   • History of indigestion    •  History of staph infection    • History of transfusion    • Mitral regurgitation    • Osteoarthritis of knee    • Renal disorder    • Tricuspid regurgitation          PAST SURGICAL HISTORY  Past Surgical History:   Procedure Laterality Date   • CATARACT EXTRACTION     • FRACTURE SURGERY      left wrist   • LAPAROSCOPIC TUBAL LIGATION     • NC TOTAL KNEE ARTHROPLASTY Right 8/15/2016    Procedure: RT TOTAL KNEE ARTHROPLASTY;  Surgeon: Marlon Wills MD;  Location: [facility];  Service: Orthopedics   • THYROID SURGERY      thyroid nodule removed--removed about a third of her thyroid   • TONSILLECTOMY     • WRIST SURGERY Left 2003         FAMILY HISTORY  Family History   Problem Relation Age of Onset   • Cirrhosis Father          SOCIAL HISTORY  Social History     Socioeconomic History   • Marital status:      Spouse name: Not on file   • Number of children: Not on file   • Years of education: Not on file   • Highest education level: Not on file   Tobacco Use   • Smoking status: Former Smoker     Packs/day: 1.00     Years: 20.00     Pack years: 20.00     Types: Cigarettes     Quit date: 2015     Years since quittin.7   • Smokeless tobacco: Never Used   • Tobacco comment: CAFFEINE USE   Substance and Sexual Activity   • Alcohol use: Yes     Alcohol/week: 1.0 standard drinks     Types: 1 Glasses of wine per week     Comment: OCCASIONAL   • Drug use: No   • Sexual activity: Defer     Birth control/protection: None         ALLERGIES  Sulfa antibiotics, Ciprofloxacin, Levofloxacin, and Macrodantin [nitrofurantoin]        REVIEW OF SYSTEMS  Review of Systems   Constitutional: Positive for fatigue. Negative for fever.   HENT: Negative for sore throat.    Eyes: Negative.    Respiratory: Positive for cough and shortness of breath.    Cardiovascular: Negative for chest pain.   Gastrointestinal: Positive for diarrhea, nausea and vomiting. Negative for abdominal pain.   Genitourinary: Negative for  dysuria.   Musculoskeletal: Negative for neck pain.   Skin: Negative for rash.   Allergic/Immunologic: Negative.    Neurological: Positive for headaches. Negative for weakness and numbness.   Hematological: Negative.    Psychiatric/Behavioral: Negative.    All other systems reviewed and are negative.       All systems reviewed and negative except for those discussed in HPI.       PHYSICAL EXAM    I have reviewed the triage vital signs and nursing notes.    ED Triage Vitals [12/02/20 1144]   Temp Heart Rate Resp BP SpO2   99.1 °F (37.3 °C) 114 20 160/100 93 %      Temp src Heart Rate Source Patient Position BP Location FiO2 (%)   Tympanic -- -- -- --       Physical Exam   Constitutional: Pt. is oriented to person, place, and time and well-developed, well-nourished, and in no distress. No distress.   HENT: Normocephalic and atraumatic,  EOM are normal. Pupils are equal, round, and reactive to light. Oropharynx moist/nonerythematous.  Neck: Normal range of motion. Neck supple. No JVD present. No tracheal deviation present. No thyromegaly present.   Cardiovascular: Normal rate, regular rhythm and normal heart sounds. Exam reveals no gallop and no friction rub.   No murmur heard.  Pulmonary/Chest: She is slightly tachypneic.  No stridor or wheezing.  She has rhonchi in the right lower lobe.  No rales.  Abdominal: Soft. Bowel sounds are normal. No distension. There is no tenderness. There is no rebound and no guarding.   Musculoskeletal: Normal range of motion. No edema, tenderness or deformity.   Neurological: Pt. is alert and oriented to person, place, and time. Pt. has normal sensation and normal strength. No cranial nerve deficit. GCS score is 15.   Skin: Skin is warm and dry. No rash noted. Pt. is not diaphoretic. No erythema.   Psychiatric: Mood, affect and judgment normal.   Nursing note and vitals reviewed.        LAB RESULTS  Recent Results (from the past 24 hour(s))   Lactic Acid, Plasma    Collection Time:  12/02/20 12:02 PM    Specimen: Blood   Result Value Ref Range    Lactate 1.9 0.5 - 2.0 mmol/L   CBC Auto Differential    Collection Time: 12/02/20 12:02 PM    Specimen: Blood   Result Value Ref Range    WBC 12.09 (H) 3.40 - 10.80 10*3/mm3    RBC 5.45 (H) 3.77 - 5.28 10*6/mm3    Hemoglobin 16.1 (H) 12.0 - 15.9 g/dL    Hematocrit 48.5 (H) 34.0 - 46.6 %    MCV 89.0 79.0 - 97.0 fL    MCH 29.5 26.6 - 33.0 pg    MCHC 33.2 31.5 - 35.7 g/dL    RDW 14.4 12.3 - 15.4 %    RDW-SD 46.1 37.0 - 54.0 fl    MPV 10.3 6.0 - 12.0 fL    Platelets 325 140 - 450 10*3/mm3    Neutrophil % 81.3 (H) 42.7 - 76.0 %    Lymphocyte % 8.9 (L) 19.6 - 45.3 %    Monocyte % 9.0 5.0 - 12.0 %    Eosinophil % 0.0 (L) 0.3 - 6.2 %    Basophil % 0.2 0.0 - 1.5 %    Immature Grans % 0.6 (H) 0.0 - 0.5 %    Neutrophils, Absolute 9.82 (H) 1.70 - 7.00 10*3/mm3    Lymphocytes, Absolute 1.08 0.70 - 3.10 10*3/mm3    Monocytes, Absolute 1.09 (H) 0.10 - 0.90 10*3/mm3    Eosinophils, Absolute 0.00 0.00 - 0.40 10*3/mm3    Basophils, Absolute 0.03 0.00 - 0.20 10*3/mm3    Immature Grans, Absolute 0.07 (H) 0.00 - 0.05 10*3/mm3    nRBC 0.0 0.0 - 0.2 /100 WBC   Comprehensive Metabolic Panel    Collection Time: 12/02/20 12:03 PM    Specimen: Blood   Result Value Ref Range    Glucose 109 (H) 65 - 99 mg/dL    BUN 33 (H) 8 - 23 mg/dL    Creatinine 1.61 (H) 0.57 - 1.00 mg/dL    Sodium 134 (L) 136 - 145 mmol/L    Potassium 4.0 3.5 - 5.2 mmol/L    Chloride 99 98 - 107 mmol/L    CO2 18.3 (L) 22.0 - 29.0 mmol/L    Calcium 9.9 8.6 - 10.5 mg/dL    Total Protein 8.1 6.0 - 8.5 g/dL    Albumin 4.20 3.50 - 5.20 g/dL    ALT (SGPT) 12 1 - 33 U/L    AST (SGOT) 24 1 - 32 U/L    Alkaline Phosphatase 148 (H) 39 - 117 U/L    Total Bilirubin 0.5 0.0 - 1.2 mg/dL    eGFR Non African Amer 32 (L) >60 mL/min/1.73    Globulin 3.9 gm/dL    A/G Ratio 1.1 g/dL    BUN/Creatinine Ratio 20.5 7.0 - 25.0    Anion Gap 16.7 (H) 5.0 - 15.0 mmol/L   Lactate Dehydrogenase    Collection Time: 12/02/20 12:03 PM     Specimen: Blood   Result Value Ref Range     (H) 135 - 214 U/L   Procalcitonin    Collection Time: 12/02/20 12:03 PM    Specimen: Blood   Result Value Ref Range    Procalcitonin 0.26 (H) 0.00 - 0.25 ng/mL   C-reactive Protein    Collection Time: 12/02/20 12:03 PM    Specimen: Blood   Result Value Ref Range    C-Reactive Protein 28.19 (H) 0.00 - 0.50 mg/dL   Ferritin    Collection Time: 12/02/20 12:03 PM    Specimen: Blood   Result Value Ref Range    Ferritin 436.00 (H) 13.00 - 150.00 ng/mL   ECG 12 Lead    Collection Time: 12/02/20 12:13 PM   Result Value Ref Range    QT Interval 327 ms   Blood Gas, Arterial -    Collection Time: 12/02/20 12:36 PM    Specimen: Arterial Blood   Result Value Ref Range    Site Arterial: right radial     Kb's Test Positive     pH, Arterial 7.383 7.350 - 7.450 pH units    pCO2, Arterial 27.1 (L) 35.0 - 45.0 mm Hg    pO2, Arterial 79.5 (L) 80.0 - 100.0 mm Hg    HCO3, Arterial 16.2 (L) 22.0 - 28.0 mmol/L    Base Excess, Arterial -7.1 (L) 0.0 - 2.0 mmol/L    O2 Saturation Calculated 95.7 92.0 - 99.0 %    Barometric Pressure for Blood Gas 760.6 mmHg    Modality Cannula     Flow Rate 1 lpm    Set [facility] Resp Rate 26        Ordered the above labs and independently reviewed the results.        RADIOLOGY  Xr Chest Ap    Result Date: 12/2/2020  CHEST SINGLE VIEW  HISTORY: Shortness of air, cough, COVID-19 positive.  COMPARISON: 2-view chest 12/22/2017.  FINDINGS: There is hazy peripheral predominant infiltrates within the right mid to lower lung consistent with COVID-19 infiltrate. The heart and mediastinal structures appear within normal limits. No perihilar edema is evident. Aortic vascular calcifications are present.      Hazy peripheral predominant infiltrates within the right mid to lower lung consistent with COVID-19 infiltrates.  This report was finalized on 12/2/2020 1:01 PM by Dr. Flako Clements M.D.        I ordered the above noted radiological studies. Reviewed by me and  discussed with radiologist.  See dictation for official radiology interpretation.      PROCEDURES    Procedures      MEDICATIONS GIVEN IN ER    Medications   sodium chloride 0.9 % flush 10 mL (has no administration in time range)   sodium chloride 0.9 % infusion (125 mL/hr Intravenous Currently Infusing 12/2/20 1507)   acetaminophen (TYLENOL) tablet 650 mg (has no administration in time range)   budesonide-formoterol (SYMBICORT) 160-4.5 MCG/ACT inhaler 2 puff (2 puffs Inhalation Not Given 12/2/20 1624)   dexamethasone (DECADRON) tablet 6 mg (has no administration in time range)   enoxaparin (LOVENOX) syringe 40 mg (has no administration in time range)   remdesivir 200 mg in sodium chloride 0.9 % 250 mL IVPB (powder vial) (has no administration in time range)     Followed by   remdesivir 100 mg in sodium chloride 0.9 % 250 mL IVPB (powder vial) (has no administration in time range)   Pharmacy Consult - Remdesivir (has no administration in time range)   ondansetron (ZOFRAN) injection 4 mg (has no administration in time range)   HYDROcodone-acetaminophen (NORCO) 5-325 MG per tablet 1 tablet (has no administration in time range)   metoprolol succinate XL (TOPROL-XL) 24 hr tablet 25 mg (has no administration in time range)   acetaminophen (TYLENOL) tablet 1,000 mg (1,000 mg Oral Given 12/2/20 1207)   dexamethasone sodium phosphate injection 6 mg (6 mg Intravenous Given 12/2/20 1453)   morphine injection 4 mg (4 mg Intravenous Given 12/2/20 1450)   ondansetron (ZOFRAN) injection 4 mg (4 mg Intravenous Given 12/2/20 1450)   sodium chloride 0.9 % bolus 1,000 mL (1,000 mL Intravenous New Bag 12/2/20 1507)         PROGRESS, DATA ANALYSIS, CONSULTS, AND MEDICAL DECISION MAKING    Any/all labs have been independently reviewed by me.  Any/all radiology studies have been reviewed by me and discussed with radiologist dictating the report.   EKG's independently viewed and interpreted by me.  Discussion below represents my analysis  of pertinent findings related to patient's condition, differential diagnosis, treatment plan and final disposition.      ED Course as of Dec 02 1626   Wed Dec 02, 2020   1220 EKG performed at 1213 and interpreted by me shows sinus tachycardia with a heart rate of 107 bpm.  The HI interval QRS complexes are normal.  There are nonspecific ST-T changes.  Rate has improved when compared to 12/22/2017-at that time she was in rapid atrial fibrillation.    [WC]   1250 pH, Arterial: 7.383 [WC]   1250 pCO2, Arterial(!): 27.1 [WC]   1250 pO2, Arterial(!): 79.5 [WC]   1250 HCO3, Arterial(!): 16.2 [WC]   1250 Flow Rate: 1 [WC]   1250 WBC(!): 12.09 [WC]   1250 Hemoglobin(!): 16.1 [WC]   1250 Hematocrit(!): 48.5 [WC]   1250 Platelets: 325 [WC]   1307 Chest x-ray independently viewed by me and interpreted by radiologist-see dictated report for official interpretation.  Briefly, there infiltrates in the right lower lobe consistent with a viral pneumonia.    [WC]   1310 Lactate: 1.9 [WC]   1316 C-Reactive Protein(!): 28.19 [WC]   1316 LDH(!): 318 [WC]   1316 BUN(!): 33 [WC]   1316 Creatinine(!): 1.61 [WC]   1318 Ferritin(!): 436.00 [WC]   1326 Procalcitonin(!): 0.26 [WC]   1345 Case discussed with Dr. Melissa Valadez ([facility])-she agrees to admit the patient to a telemetry bed.    [WC]      ED Course User Index  [WC] Eduardo Walsh MD       AS OF 16:26 EST VITALS:    BP - 162/96  HR - 83  TEMP - 99.1 °F (37.3 °C) (Tympanic)  02 SATS - 95%        DIAGNOSIS  Final diagnoses:   Pneumonia due to COVID-19 virus   Acute hypoxemic respiratory failure (CMS/HCC)         DISPOSITION  Admitted-telemetry           Eduardo Walsh MD  12/02/20 1992

## 2020-12-02 NOTE — ED TRIAGE NOTES
Was seen at [facility] and DX with COVID prior to arrival. C/O SOA and Cough     Mask placed on patient in triage. Triage staff wore appropriate PPE during interaction with patient.

## 2020-12-02 NOTE — PROGRESS NOTES
"[facility]  Clinical Pharmacy Department     Remdesivir Review Note    Leonard Best is a 70 y.o. female with confirmed COVID-19 infection on day 1 of hospitalization.     Consulting Provider:  Dr. Valadez  Date of Confirmed SARS-CoV-2: 2020 ([facility])  Date of Symptom Onset:   Planned Duration of Therapy: 5 days  Other Antimicrobials: None  Hydroxychloroquine or chloroquine prior to arrival: No    Allergies  Allergies as of 2020 - Reviewed 2020   Allergen Reaction Noted    Sulfa antibiotics Rash 2015    Ciprofloxacin Myalgia 2016    Levofloxacin Myalgia 2015    Macrodantin [nitrofurantoin] Swelling 2015       Microbiology:  Microbiology Results (last 10 days)       ** No results found for the last 240 hours. **            Radiology/Imagin/2 Chest XR: \"Hazy peripheral predominant infiltrates within the right mid to lower lung consistent with COVID-19 infiltrates. \"    Vitals/Labs/I&O  [unfilled]    Results from last 7 days   Lab Units 20  1202   WBC 10*3/mm3 12.09*     Results from last 7 days   Lab Units 20  1203   PROCALCITONIN ng/mL 0.26*     Results from last 7 days   Lab Units 20  1203   AST (SGOT) U/L 24      Results from last 7 days   Lab Units 20  1203   ALT (SGPT) U/L 12       Estimated Creatinine Clearance: 35.5 mL/min (A) (by C-G formula based on SCr of 1.61 mg/dL (H)).  Results from last 7 days   Lab Units 20  1203   BUN mg/dL 33*   CREATININE mg/dL 1.61*     Intake & Output (last 3 days)       None            Assessment/Plan:    Patient is hospitalized with confirmed, severe COVID-19 infection and started on remdesivir 200 mg IV once followed by 100 mg IV daily for 4 days (5 day total duration). All inclusions, exclusions, and monitoring requirements listed below have been reviewed.    Patient is hospitalized with confirmed COVID-19 infection  Patient is requiring ?2 L of oxygen to maintain oxygen saturations " of ?94%   Baseline and daily LFTs and Scr have been ordered prior to remdesivir initiation  ALT is not ? 5 times the upper limit of normal  Patient is not on concomitant hydroxychloroquine or chloroquine       Thank you for involving pharmacy in this patient's care. Please contact pharmacy with any questions or concerns.                           Pritesh Limon [facility]  Clinical Pharmacist  12/02/20 15:45 EST

## 2020-12-02 NOTE — ED NOTES
"Nursing report ED to floor  Leonard Best  70 y.o.  female    HPI (triage note):   Chief Complaint   Patient presents with   • Shortness of Breath   • Cough       Admitting doctor:   Melissa Valadez MD    Admitting diagnosis:   The primary encounter diagnosis was Pneumonia due to COVID-19 virus. A diagnosis of Acute hypoxemic respiratory failure (CMS/HCC) was also pertinent to this visit.    Code status:   Current Code Status     Date Active Code Status Order ID Comments User Context       Prior    Advance Care Planning Activity          Allergies:   Sulfa antibiotics, Ciprofloxacin, Levofloxacin, and Macrodantin [nitrofurantoin]    Weight:       12/02/20  1205   Weight: 81.6 kg (180 lb)       Most recent vitals:   Vitals:    12/02/20 1144 12/02/20 1205 12/02/20 1456   BP: 160/100  (!) 163/102   Pulse: 114 113 94   Resp: 20 20 16   Temp: 99.1 °F (37.3 °C)     TempSrc: Tympanic     SpO2: 93% 93% 94%   Weight:  81.6 kg (180 lb)    Height:  167.6 cm (66\")        Active LDAs/IV Access:   Lines, Drains & Airways    Active LDAs     Name:   Placement date:   Placement time:   Site:   Days:    Peripheral IV 12/02/20 1207 Right Antecubital   12/02/20    1207    Antecubital   less than 1                Labs (abnormal labs have a star):   Labs Reviewed   COMPREHENSIVE METABOLIC PANEL - Abnormal; Notable for the following components:       Result Value    Glucose 109 (*)     BUN 33 (*)     Creatinine 1.61 (*)     Sodium 134 (*)     CO2 18.3 (*)     Alkaline Phosphatase 148 (*)     eGFR Non  Amer 32 (*)     Anion Gap 16.7 (*)     All other components within normal limits    Narrative:     GFR Normal >60  Chronic Kidney Disease <60  Kidney Failure <15     LACTATE DEHYDROGENASE - Abnormal; Notable for the following components:     (*)     All other components within normal limits   PROCALCITONIN - Abnormal; Notable for the following components:    Procalcitonin 0.26 (*)     All other components within normal " "limits    Narrative:     As a Marker for Sepsis (Non-Neonates):   1. <0.5 ng/mL represents a low risk of severe sepsis and/or septic shock.  1. >2 ng/mL represents a high risk of severe sepsis and/or septic shock.    As a Marker for Lower Respiratory Tract Infections that require antibiotic therapy:  PCT on Admission     Antibiotic Therapy             6-12 Hrs later  > 0.5                Strongly Recommended            >0.25 - <0.5         Recommended  0.1 - 0.25           Discouraged                   Remeasure/reassess PCT  <0.1                 Strongly Discouraged          Remeasure/reassess PCT      As 28 day mortality risk marker: \"Change in Procalcitonin Result\" (> 80 % or <=80 %) if Day 0 (or Day 1) and Day 4 values are available. Refer to http://www."[email or web address]   Change in PCT <=80 %   A decrease of PCT levels below or equal to 80 % defines a positive change in PCT test result representing a higher risk for 28-day all-cause mortality of patients diagnosed with severe sepsis or septic shock.  Change in PCT > 80 %   A decrease of PCT levels of more than 80 % defines a negative change in PCT result representing a lower risk for 28-day all-cause mortality of patients diagnosed with severe sepsis or septic shock.                Results may be falsely decreased if patient taking Biotin.    C-REACTIVE PROTEIN - Abnormal; Notable for the following components:    C-Reactive Protein 28.19 (*)     All other components within normal limits   FERRITIN - Abnormal; Notable for the following components:    Ferritin 436.00 (*)     All other components within normal limits    Narrative:     Results may be falsely decreased if patient taking Biotin.     CBC WITH AUTO DIFFERENTIAL - Abnormal; Notable for the following components:    WBC 12.09 (*)     RBC 5.45 (*)     Hemoglobin 16.1 (*)     Hematocrit 48.5 (*)     Neutrophil % 81.3 (*)     Lymphocyte % 8.9 (*)     Eosinophil % 0.0 (*)     Immature Grans % 0.6 (*) "     Neutrophils, Absolute 9.82 (*)     Monocytes, Absolute 1.09 (*)     Immature Grans, Absolute 0.07 (*)     All other components within normal limits   BLOOD GAS, ARTERIAL - Abnormal; Notable for the following components:    pCO2, Arterial 27.1 (*)     pO2, Arterial 79.5 (*)     HCO3, Arterial 16.2 (*)     Base Excess, Arterial -7.1 (*)     All other components within normal limits   LACTIC ACID, PLASMA - Normal   BLOOD GAS, ARTERIAL   CBC AND DIFFERENTIAL    Narrative:     The following orders were created for panel order CBC & Differential.  Procedure                               Abnormality         Status                     ---------                               -----------         ------                     CBC Auto Differential[[id number]]        Abnormal            Final result                 Please view results for these tests on the individual orders.       EKG:   ECG 12 Lead   Preliminary Result   HEART RATE= 107  bpm   RR Interval= 564  ms   VA Interval= 136  ms   P Horizontal Axis= 13  deg   P Front Axis= 50  deg   QRSD Interval= 86  ms   QT Interval= 327  ms   QRS Axis= -1  deg   T Wave Axis= -13  deg   - BORDERLINE ECG -   Sinus tachycardia   Borderline T abnormalities, diffuse leads   Electronically Signed By:    Date and Time of Study: 2020-12-02 12:13:16          Meds given in ED:   Medications   sodium chloride 0.9 % flush 10 mL (has no administration in time range)   sodium chloride 0.9 % infusion (125 mL/hr Intravenous Currently Infusing 12/2/20 1507)   sodium chloride 0.9 % bolus 1,000 mL (1,000 mL Intravenous New Bag 12/2/20 1507)   acetaminophen (TYLENOL) tablet 650 mg (has no administration in time range)   budesonide-formoterol (SYMBICORT) 160-4.5 MCG/ACT inhaler 2 puff (has no administration in time range)   dexamethasone (DECADRON) tablet 6 mg (has no administration in time range)   enoxaparin (LOVENOX) syringe 40 mg (has no administration in time range)   remdesivir 200 mg in sodium  chloride 0.9 % 250 mL IVPB (powder vial) (has no administration in time range)     Followed by   remdesivir 100 mg in sodium chloride 0.9 % 250 mL IVPB (powder vial) (has no administration in time range)   Pharmacy Consult - Remdesivir (has no administration in time range)   ondansetron (ZOFRAN) injection 4 mg (has no administration in time range)   HYDROcodone-acetaminophen (NORCO) 5-325 MG per tablet 1 tablet (has no administration in time range)   metoprolol succinate XL (TOPROL-XL) 24 hr tablet 25 mg (has no administration in time range)   acetaminophen (TYLENOL) tablet 1,000 mg (1,000 mg Oral Given 20 1207)   dexamethasone sodium phosphate injection 6 mg (6 mg Intravenous Given 20 1453)   morphine injection 4 mg (4 mg Intravenous Given 20 1450)   ondansetron (ZOFRAN) injection 4 mg (4 mg Intravenous Given 20 1450)       Imaging results:  Xr Chest Ap    Result Date: 2020  Hazy peripheral predominant infiltrates within the right mid to lower lung consistent with COVID-19 infiltrates.  This report was finalized on 2020 1:01 PM by Dr. Flako Clements M.D.        Ambulatory status:   - ad yana    Social issues:   Social History     Socioeconomic History   • Marital status:      Spouse name: Not on file   • Number of children: Not on file   • Years of education: Not on file   • Highest education level: Not on file   Tobacco Use   • Smoking status: Former Smoker     Packs/day: 1.00     Years: 20.00     Pack years: 20.00     Types: Cigarettes     Quit date: 2015     Years since quittin.7   • Smokeless tobacco: Never Used   • Tobacco comment: CAFFEINE USE   Substance and Sexual Activity   • Alcohol use: Yes     Alcohol/week: 1.0 standard drinks     Types: 1 Glasses of wine per week     Comment: OCCASIONAL   • Drug use: No   • Sexual activity: Defer     Birth control/protection: None    Nursing report ED to floor       Young, Ny, RN  [phone]

## 2020-12-02 NOTE — ED NOTES
Pt noted to have mask on when this RN entered the room.  This RN wore appropriate PPE throughout our encounter. Hand hygiene performed upon entering and exiting room.       Ny Herrera RN  12/02/20 5405

## 2020-12-03 LAB
ALBUMIN SERPL-MCNC: 3.2 G/DL (ref 3.5–5.2)
ALBUMIN/GLOB SERPL: 1.1 G/DL
ALP SERPL-CCNC: 104 U/L (ref 39–117)
ALT SERPL W P-5'-P-CCNC: 11 U/L (ref 1–33)
ANION GAP SERPL CALCULATED.3IONS-SCNC: 11.7 MMOL/L (ref 5–15)
AST SERPL-CCNC: 17 U/L (ref 1–32)
BASOPHILS # BLD AUTO: 0.01 10*3/MM3 (ref 0–0.2)
BASOPHILS NFR BLD AUTO: 0.2 % (ref 0–1.5)
BILIRUB SERPL-MCNC: 0.2 MG/DL (ref 0–1.2)
BUN SERPL-MCNC: 30 MG/DL (ref 8–23)
BUN/CREAT SERPL: 26.5 (ref 7–25)
CALCIUM SPEC-SCNC: 8.6 MG/DL (ref 8.6–10.5)
CHLORIDE SERPL-SCNC: 108 MMOL/L (ref 98–107)
CK SERPL-CCNC: 65 U/L (ref 20–180)
CO2 SERPL-SCNC: 17.3 MMOL/L (ref 22–29)
CREAT SERPL-MCNC: 1.13 MG/DL (ref 0.57–1)
CRP SERPL-MCNC: 20.15 MG/DL (ref 0–0.5)
D DIMER PPP FEU-MCNC: 0.75 MCGFEU/ML (ref 0–0.49)
DEPRECATED RDW RBC AUTO: 46 FL (ref 37–54)
EOSINOPHIL # BLD AUTO: 0 10*3/MM3 (ref 0–0.4)
EOSINOPHIL NFR BLD AUTO: 0 % (ref 0.3–6.2)
ERYTHROCYTE [DISTWIDTH] IN BLOOD BY AUTOMATED COUNT: 14.2 % (ref 12.3–15.4)
FERRITIN SERPL-MCNC: 382 NG/ML (ref 13–150)
FIBRINOGEN PPP-MCNC: 510 MG/DL (ref 219–464)
GFR SERPL CREATININE-BSD FRML MDRD: 48 ML/MIN/1.73
GLOBULIN UR ELPH-MCNC: 2.8 GM/DL
GLUCOSE SERPL-MCNC: 117 MG/DL (ref 65–99)
HCT VFR BLD AUTO: 38.9 % (ref 34–46.6)
HGB BLD-MCNC: 12.9 G/DL (ref 12–15.9)
IMM GRANULOCYTES # BLD AUTO: 0.04 10*3/MM3 (ref 0–0.05)
IMM GRANULOCYTES NFR BLD AUTO: 0.7 % (ref 0–0.5)
LDH SERPL-CCNC: 247 U/L (ref 135–214)
LYMPHOCYTES # BLD AUTO: 0.78 10*3/MM3 (ref 0.7–3.1)
LYMPHOCYTES NFR BLD AUTO: 14.2 % (ref 19.6–45.3)
MAGNESIUM SERPL-MCNC: 2.2 MG/DL (ref 1.6–2.4)
MCH RBC QN AUTO: 29.8 PG (ref 26.6–33)
MCHC RBC AUTO-ENTMCNC: 33.2 G/DL (ref 31.5–35.7)
MCV RBC AUTO: 89.8 FL (ref 79–97)
MONOCYTES # BLD AUTO: 0.3 10*3/MM3 (ref 0.1–0.9)
MONOCYTES NFR BLD AUTO: 5.5 % (ref 5–12)
NEUTROPHILS NFR BLD AUTO: 4.36 10*3/MM3 (ref 1.7–7)
NEUTROPHILS NFR BLD AUTO: 79.4 % (ref 42.7–76)
NRBC BLD AUTO-RTO: 0 /100 WBC (ref 0–0.2)
PLATELET # BLD AUTO: 266 10*3/MM3 (ref 140–450)
PMV BLD AUTO: 10.4 FL (ref 6–12)
POTASSIUM SERPL-SCNC: 4.4 MMOL/L (ref 3.5–5.2)
PROT SERPL-MCNC: 6 G/DL (ref 6–8.5)
RBC # BLD AUTO: 4.33 10*6/MM3 (ref 3.77–5.28)
SODIUM SERPL-SCNC: 137 MMOL/L (ref 136–145)
TROPONIN T SERPL-MCNC: <0.01 NG/ML (ref 0–0.03)
WBC # BLD AUTO: 5.49 10*3/MM3 (ref 3.4–10.8)

## 2020-12-03 PROCEDURE — 80053 COMPREHEN METABOLIC PANEL: CPT | Performed by: INTERNAL MEDICINE

## 2020-12-03 PROCEDURE — 84484 ASSAY OF TROPONIN QUANT: CPT | Performed by: INTERNAL MEDICINE

## 2020-12-03 PROCEDURE — 86140 C-REACTIVE PROTEIN: CPT | Performed by: INTERNAL MEDICINE

## 2020-12-03 PROCEDURE — 85025 COMPLETE CBC W/AUTO DIFF WBC: CPT | Performed by: INTERNAL MEDICINE

## 2020-12-03 PROCEDURE — 82550 ASSAY OF CK (CPK): CPT | Performed by: INTERNAL MEDICINE

## 2020-12-03 PROCEDURE — 94799 UNLISTED PULMONARY SVC/PX: CPT

## 2020-12-03 PROCEDURE — 83615 LACTATE (LD) (LDH) ENZYME: CPT | Performed by: INTERNAL MEDICINE

## 2020-12-03 PROCEDURE — 85384 FIBRINOGEN ACTIVITY: CPT | Performed by: INTERNAL MEDICINE

## 2020-12-03 PROCEDURE — 83735 ASSAY OF MAGNESIUM: CPT | Performed by: INTERNAL MEDICINE

## 2020-12-03 PROCEDURE — 82728 ASSAY OF FERRITIN: CPT | Performed by: INTERNAL MEDICINE

## 2020-12-03 PROCEDURE — 25010000002 ENOXAPARIN PER 10 MG: Performed by: INTERNAL MEDICINE

## 2020-12-03 PROCEDURE — 85379 FIBRIN DEGRADATION QUANT: CPT | Performed by: INTERNAL MEDICINE

## 2020-12-03 PROCEDURE — 87040 BLOOD CULTURE FOR BACTERIA: CPT | Performed by: INTERNAL MEDICINE

## 2020-12-03 PROCEDURE — 63710000001 DEXAMETHASONE PER 0.25 MG: Performed by: INTERNAL MEDICINE

## 2020-12-03 RX ORDER — MELATONIN
1000 DAILY
Status: DISCONTINUED | OUTPATIENT
Start: 2020-12-04 | End: 2020-12-06 | Stop reason: HOSPADM

## 2020-12-03 RX ORDER — MELATONIN
1000 DAILY
Status: DISCONTINUED | OUTPATIENT
Start: 2020-12-03 | End: 2020-12-03

## 2020-12-03 RX ADMIN — REMDESIVIR 100 MG: 100 INJECTION, POWDER, LYOPHILIZED, FOR SOLUTION INTRAVENOUS at 12:51

## 2020-12-03 RX ADMIN — METOPROLOL SUCCINATE 25 MG: 25 TABLET, EXTENDED RELEASE ORAL at 08:08

## 2020-12-03 RX ADMIN — ENOXAPARIN SODIUM 40 MG: 40 INJECTION SUBCUTANEOUS at 06:03

## 2020-12-03 RX ADMIN — ENOXAPARIN SODIUM 40 MG: 40 INJECTION SUBCUTANEOUS at 17:05

## 2020-12-03 RX ADMIN — ROSUVASTATIN CALCIUM 20 MG: 20 TABLET, FILM COATED ORAL at 08:07

## 2020-12-03 RX ADMIN — LEVOTHYROXINE SODIUM 25 MCG: 25 TABLET ORAL at 08:07

## 2020-12-03 RX ADMIN — Medication 2000 UNITS: at 08:07

## 2020-12-03 RX ADMIN — BUDESONIDE AND FORMOTEROL FUMARATE DIHYDRATE 2 PUFF: 160; 4.5 AEROSOL RESPIRATORY (INHALATION) at 08:01

## 2020-12-03 RX ADMIN — FOLIC ACID 1000 MCG: 1 TABLET ORAL at 08:07

## 2020-12-03 RX ADMIN — OXYCODONE HYDROCHLORIDE AND ACETAMINOPHEN 2000 MG: 500 TABLET ORAL at 08:07

## 2020-12-03 RX ADMIN — AMLODIPINE BESYLATE 5 MG: 5 TABLET ORAL at 08:07

## 2020-12-03 RX ADMIN — DEXAMETHASONE 6 MG: 4 TABLET ORAL at 08:08

## 2020-12-03 RX ADMIN — BUDESONIDE AND FORMOTEROL FUMARATE DIHYDRATE 2 PUFF: 160; 4.5 AEROSOL RESPIRATORY (INHALATION) at 20:15

## 2020-12-03 RX ADMIN — Medication 1000 MCG: at 08:08

## 2020-12-03 RX ADMIN — SODIUM CHLORIDE 125 ML/HR: 9 INJECTION, SOLUTION INTRAVENOUS at 08:05

## 2020-12-03 RX ADMIN — HYDROCODONE BITARTRATE AND ACETAMINOPHEN 1 TABLET: 5; 325 TABLET ORAL at 12:53

## 2020-12-03 NOTE — PLAN OF CARE
Goal Outcome Evaluation:  Plan of Care Reviewed With: patient  Progress: improving  Outcome Summary: Patient states she feels a little better today than yesterday. Day 2 of remdesivir given today and PO decadron given per order. She remains on 4L NC and tolerating well. She does have a dry cough on and off noted today. She has been ambulating in the room ad yana with no issues. She complained of a headache once during the shift and was treated with PRN norco. Will ctm

## 2020-12-03 NOTE — PLAN OF CARE
Problem: Adult Inpatient Plan of Care  Goal: Plan of Care Review  12/3/2020 0510 by Denis Crisostomo RN  Outcome: Ongoing, Progressing  Flowsheets (Taken 12/3/2020 0510)  Progress: no change  Outcome Summary: Patient here from ED, only distressed noted while active. Sleep apnea noted while sleeping, O2 increaqsed to 4 lpm to maintain saturation >90%.  12/3/2020 0507 by Denis Crisostomo RN  Outcome: Ongoing, Progressing  Flowsheets (Taken 12/3/2020 0507)  Progress: no change  Plan of Care Reviewed With: patient     Problem: Fall Injury Risk  Goal: Absence of Fall and Fall-Related Injury  12/3/2020 0510 by Denis Crisostomo RN  Outcome: Ongoing, Progressing  12/3/2020 0507 by Denis Crisostomo RN  Outcome: Ongoing, Progressing  Intervention: Identify and Manage Contributors to Fall Injury Risk  Recent Flowsheet Documentation  Taken 12/2/2020 2000 by Denis Crisostomo RN  Medication Review/Management: medications reviewed  Intervention: Promote Injury-Free Environment  Recent Flowsheet Documentation  Taken 12/3/2020 0400 by Denis Crisostomo RN  Safety Promotion/Fall Prevention:   activity supervised   fall prevention program maintained  Taken 12/3/2020 0200 by Denis Crisostomo RN  Safety Promotion/Fall Prevention: safety round/check completed  Taken 12/3/2020 0000 by Denis Crisostomo RN  Safety Promotion/Fall Prevention: safety round/check completed  Taken 12/2/2020 2200 by Denis Crisostomo RN  Safety Promotion/Fall Prevention: safety round/check completed  Taken 12/2/2020 2000 by Denis Crisostomo RN  Safety Promotion/Fall Prevention:   activity supervised   fall prevention program maintained   Goal Outcome Evaluation:  Plan of Care Reviewed With: patient  Progress: no change

## 2020-12-03 NOTE — PROGRESS NOTES
[facility] HOSPITALIST    PROGRESS NOTE    Name:  Leonard Best   Age:  70 y.o.  Sex:  female  :  1950  MRN:  [id number]   Visit Number:  [id number]  Admission Date:  2020  Date Of Service:  20  Primary Care Physician:  Felicia Gloria PA-C     LOS: 1 day :  Patient Care Team:  Felicia Gloria PA-C as PCP - General  Felicia Gloria PA-C as PCP - Family Medicine  Kvng Saldivar MD as Consulting Physician (Nephrology)  Murtaza Woods MD as Consulting Physician (Cardiology)  Denis Worley MD as Consulting Physician (Urology):    History taken from:     patient chart    Chief Complaint:      Dyspnea    Subjective     Interval History:     Patient seen and examined today.  Reviewed history and physical, lab work, radiology, chart.    Patient is a 70-year-old female with history of paroxysmal SVT who comes in with shortness of breath, fatigue, nausea, vomiting, diarrhea.  This has been going on since Saturday she states.  In the emergency room she was noted to have a few minutes of tachycardia in the 170s.  She currently is in sinus rhythm with rate controlled.  She is noted to be hypoxia.  Chest x-ray did show right-sided pneumonia.  Patient was admitted and placed on Decadron.  Remdesivir has been started patient is agreeable.  Patient was receiving IV fluids will discontinue these as her creatinine has improved down to 1.13.    Patient states she feels better.  She still has dyspnea, worse on exertion.  She denies any chest pressure, nausea, vomiting, diarrhea at present.  Home medications have been restarted.    Review of Systems:     All systems were reviewed and negative except for:  Respiratory: positive for  cough, dry and shortness of air    Objective     Vital Signs:    Temp:  [97.1 °F (36.2 °C)-98 °F (36.7 °C)] 97.1 °F (36.2 °C)  Heart Rate:  [72-94] 76  Resp:  [16-20] 16  BP: (143-163)/() 152/97    Physical Exam:    General: Alert and oriented x4,  mild distress.  Heart: Regular rate and rhythm without murmur rub or thrill  Lungs: Rhonchi noted on the right.  No use of accessory muscles respiration.  Abdomen: Soft/nontender/nondistended.  No paraspinal megaly is noted.  MSK: 4/5 strength in upper/lower extremities bilaterally.     Results Review:      I reviewed the patient's new clinical results.    Labs:    Lab Results (last 24 hours)     Procedure Component Value Units Date/Time    Blood Culture - Blood, Arm, Left [[id number]] Collected: 12/03/20 1059    Specimen: Blood from Arm, Left Updated: 12/03/20 1137    Blood Culture - Blood, Hand, Right [[id number]] Collected: 12/03/20 1100    Specimen: Blood from Hand, Right Updated: 12/03/20 1137    C-reactive Protein [[id number]]  (Abnormal) Collected: 12/03/20 0221    Specimen: Blood Updated: 12/03/20 0511     C-Reactive Protein 20.15 mg/dL     Troponin [[id number]]  (Normal) Collected: 12/03/20 0221    Specimen: Blood Updated: 12/03/20 0430     Troponin T <0.010 ng/mL     Narrative:      Troponin T Reference Range:  <= 0.03 ng/mL-   Negative for AMI  >0.03 ng/mL-     Abnormal for myocardial necrosis.  Clinicians would have to utilize clinical acumen, EKG, Troponin and serial changes to determine if it is an Acute Myocardial Infarction or myocardial injury due to an underlying chronic condition.       Results may be falsely decreased if patient taking Biotin.      Ferritin [[id number]]  (Abnormal) Collected: 12/03/20 0221    Specimen: Blood Updated: 12/03/20 0430     Ferritin 382.00 ng/mL     Narrative:      Results may be falsely decreased if patient taking Biotin.      Fibrinogen [[id number]]  (Abnormal) Collected: 12/03/20 0221    Specimen: Blood Updated: 12/03/20 0426     Fibrinogen 510 mg/dL     Comprehensive Metabolic Panel [[id number]]  (Abnormal) Collected: 12/03/20 0221    Specimen: Blood Updated: 12/03/20 0426     Glucose 117 mg/dL      BUN 30 mg/dL      Creatinine 1.13 mg/dL      Sodium 137 mmol/L       Potassium 4.4 mmol/L      Chloride 108 mmol/L      CO2 17.3 mmol/L      Calcium 8.6 mg/dL      Total Protein 6.0 g/dL      Albumin 3.20 g/dL      ALT (SGPT) 11 U/L      AST (SGOT) 17 U/L      Alkaline Phosphatase 104 U/L      Total Bilirubin 0.2 mg/dL      eGFR Non African Amer 48 mL/min/1.73      Globulin 2.8 gm/dL      A/G Ratio 1.1 g/dL      BUN/Creatinine Ratio 26.5     Anion Gap 11.7 mmol/L     Narrative:      GFR Normal >60  Chronic Kidney Disease <60  Kidney Failure <15      D-dimer, Quantitative [[id number]]  (Abnormal) Collected: 12/03/20 0221    Specimen: Blood Updated: 12/03/20 0426     D-Dimer, Quantitative 0.75 MCGFEU/mL     Narrative:      The Stago D-Dimer test used in conjunction with a clinical pretest probability (PTP) assessment model, has been approved by the FDA to rule out the presence of venous thromboembolism (VTE) in outpatients suspected of deep venous thrombosis (DVT) or pulmonary embolism (PE). The cut-off for negative predictive value is <0.50 MCGFEU/mL.    Magnesium [[id number]]  (Normal) Collected: 12/03/20 0221    Specimen: Blood Updated: 12/03/20 0422     Magnesium 2.2 mg/dL     CK [[id number]]  (Normal) Collected: 12/03/20 0221    Specimen: Blood Updated: 12/03/20 0422     Creatine Kinase 65 U/L     Lactate Dehydrogenase [[id number]]  (Abnormal) Collected: 12/03/20 0221    Specimen: Blood Updated: 12/03/20 0422      U/L     CBC & Differential [[id number]]  (Abnormal) Collected: 12/03/20 0221    Specimen: Blood Updated: 12/03/20 0352    Narrative:      The following orders were created for panel order CBC & Differential.  Procedure                               Abnormality         Status                     ---------                               -----------         ------                     CBC Auto Differential[[id number]]        Abnormal            Final result                 Please view results for these tests on the individual orders.    CBC Auto Differential [[id number]]   (Abnormal) Collected: 12/03/20 0221    Specimen: Blood Updated: 12/03/20 0352     WBC 5.49 10*3/mm3      RBC 4.33 10*6/mm3      Hemoglobin 12.9 g/dL      Hematocrit 38.9 %      MCV 89.8 fL      MCH 29.8 pg      MCHC 33.2 g/dL      RDW 14.2 %      RDW-SD 46.0 fl      MPV 10.4 fL      Platelets 266 10*3/mm3      Neutrophil % 79.4 %      Lymphocyte % 14.2 %      Monocyte % 5.5 %      Eosinophil % 0.0 %      Basophil % 0.2 %      Immature Grans % 0.7 %      Neutrophils, Absolute 4.36 10*3/mm3      Lymphocytes, Absolute 0.78 10*3/mm3      Monocytes, Absolute 0.30 10*3/mm3      Eosinophils, Absolute 0.00 10*3/mm3      Basophils, Absolute 0.01 10*3/mm3      Immature Grans, Absolute 0.04 10*3/mm3      nRBC 0.0 /100 WBC     Troponin [[id number]]  (Normal) Collected: 12/02/20 2033    Specimen: Blood from Hand, Left Updated: 12/02/20 2106     Troponin T <0.010 ng/mL     Narrative:      Troponin T Reference Range:  <= 0.03 ng/mL-   Negative for AMI  >0.03 ng/mL-     Abnormal for myocardial necrosis.  Clinicians would have to utilize clinical acumen, EKG, Troponin and serial changes to determine if it is an Acute Myocardial Infarction or myocardial injury due to an underlying chronic condition.       Results may be falsely decreased if patient taking Biotin.      Procalcitonin [[id number]]  (Abnormal) Collected: 12/02/20 1203    Specimen: Blood Updated: 12/02/20 1320     Procalcitonin 0.26 ng/mL     Narrative:      As a Marker for Sepsis (Non-Neonates):   1. <0.5 ng/mL represents a low risk of severe sepsis and/or septic shock.  1. >2 ng/mL represents a high risk of severe sepsis and/or septic shock.    As a Marker for Lower Respiratory Tract Infections that require antibiotic therapy:  PCT on Admission     Antibiotic Therapy             6-12 Hrs later  > 0.5                Strongly Recommended            >0.25 - <0.5         Recommended  0.1 - 0.25           Discouraged                   Remeasure/reassess PCT  <0.1                "  Strongly Discouraged          Remeasure/reassess PCT      As 28 day mortality risk marker: \"Change in Procalcitonin Result\" (> 80 % or <=80 %) if Day 0 (or Day 1) and Day 4 values are available. Refer to http://www.[facility]-pct-calculator.com/   Change in PCT <=80 %   A decrease of PCT levels below or equal to 80 % defines a positive change in PCT test result representing a higher risk for 28-day all-cause mortality of patients diagnosed with severe sepsis or septic shock.  Change in PCT > 80 %   A decrease of PCT levels of more than 80 % defines a negative change in PCT result representing a lower risk for 28-day all-cause mortality of patients diagnosed with severe sepsis or septic shock.                Results may be falsely decreased if patient taking Biotin.     Ferritin [[id number]]  (Abnormal) Collected: 12/02/20 1203    Specimen: Blood Updated: 12/02/20 1317     Ferritin 436.00 ng/mL     Narrative:      Results may be falsely decreased if patient taking Biotin.      Comprehensive Metabolic Panel [[id number]]  (Abnormal) Collected: 12/02/20 1203    Specimen: Blood Updated: 12/02/20 1314     Glucose 109 mg/dL      BUN 33 mg/dL      Creatinine 1.61 mg/dL      Sodium 134 mmol/L      Potassium 4.0 mmol/L      Chloride 99 mmol/L      CO2 18.3 mmol/L      Calcium 9.9 mg/dL      Total Protein 8.1 g/dL      Albumin 4.20 g/dL      ALT (SGPT) 12 U/L      AST (SGOT) 24 U/L      Alkaline Phosphatase 148 U/L      Total Bilirubin 0.5 mg/dL      eGFR Non African Amer 32 mL/min/1.73      Globulin 3.9 gm/dL      A/G Ratio 1.1 g/dL      BUN/Creatinine Ratio 20.5     Anion Gap 16.7 mmol/L     Narrative:      GFR Normal >60  Chronic Kidney Disease <60  Kidney Failure <15      Lactate Dehydrogenase [[id number]]  (Abnormal) Collected: 12/02/20 1203    Specimen: Blood Updated: 12/02/20 1314      U/L     C-reactive Protein [[id number]]  (Abnormal) Collected: 12/02/20 1203    Specimen: Blood Updated: 12/02/20 1314     C-Reactive " Protein 28.19 mg/dL     Lactic Acid, Plasma [[id number]]  (Normal) Collected: 12/02/20 1202    Specimen: Blood Updated: 12/02/20 1310     Lactate 1.9 mmol/L            Radiology:    Imaging Results (Last 24 Hours)     ** No results found for the last 24 hours. **          Medication Review:     amLODIPine, 5 mg, Oral, Daily  budesonide-formoterol, 2 puff, Inhalation, BID - RT  cholecalciferol, 2,000 Units, Oral, Daily  dexamethasone, 6 mg, Oral, Daily With Breakfast  enoxaparin, 40 mg, Subcutaneous, Q12H  folic acid, 1,000 mcg, Oral, Daily  levothyroxine, 25 mcg, Oral, Once per day on Mon Tue Wed Thu Fri Sat  metoprolol succinate XL, 25 mg, Oral, Q24H  remdesivir, 100 mg, Intravenous, Q24H  rosuvastatin, 20 mg, Oral, Daily  vitamin B-12, 1,000 mcg, Oral, Daily  vitamin C, 2,000 mg, Oral, Daily        Pharmacy Consult - Remdesivir,         Assessment/Plan     Problem List Items Addressed This Visit        Other    Pneumonia due to COVID-19 virus - Primary      Other Visit Diagnoses     Acute hypoxemic respiratory failure (CMS/HCC)              1.  Covid-19 pneumonia, present on admission  2.  Acute respiratory failure with hypoxia  3.  Paroxysmal SVT, restart beta-blocker  4.  High anion gap metabolic acidosis, due to dehydration  5.  Chronic kidney disease stage IIIb  6.  Hypothyroidism  7.  Chronic diastolic congestive heart failure  8.  Essential hypertension, uncontrolled    Plan:    We will continue with Decadron and remdesivir.  Discontinue IV fluids at present.  Restart home medications.  Monitor inflammatory markers.  Monitor oxygen status closely.  Lovenox for DVT prophylaxis.  Further recommendations will depend on the clinical course.    Seb Worley,   12/03/20  13:04 EST

## 2020-12-03 NOTE — PROGRESS NOTES
Discharge Planning Assessment  [facility]     Patient Name: Leonard Best  MRN: [id number]  Today's Date: 12/3/2020    Admit Date: 12/2/2020    Discharge Needs Assessment     Row Name 12/03/20 7527       Living Environment    Lives With  spouse    Current Living Arrangements  home/apartment/condo    Primary Care Provided by  self    Able to Return to Prior Arrangements  yes       Transition Planning    Patient/Family Anticipates Transition to  home with family    Patient/Family Anticipated Services at Transition  none    Transportation Anticipated  family or friend will provide       Discharge Needs Assessment    Equipment Currently Used at Home  none    Concerns to be Addressed  denies needs/concerns at this time;adjustment to diagnosis/illness;basic needs    Anticipated Changes Related to Illness  none    Equipment Needed After Discharge  none        Discharge Plan     Row Name 12/03/20 7326       Plan    Patient/Family in Agreement with Plan  yes    Plan Comments  CCP spoke with pt by phone to discuss dc planning. Confirmed face sheet and primary pharmacy as [facility] in [facility].  Pt states she lives with spouse in [facility]. No DME.  Plans are home, states spouse was dx'd with Covid about a week ago.  CCP will cont to follow for dc needs.  Hiral Norman RN    Row Name 12/03/20 9158       Plan    Plan  Plans are home        Continued Care and Services - Admitted Since 12/2/2020    Coordination has not been started for this encounter.       Expected Discharge Date and Time     Expected Discharge Date Expected Discharge Time    Dec 5, 2020         Demographic Summary    No documentation.       Functional Status     Row Name 12/03/20 6357       Functional Status    Usual Activity Tolerance  good    Current Activity Tolerance  moderate       Functional Status, IADL    Medications  independent        Psychosocial    No documentation.       Abuse/Neglect    No documentation.       Legal     Row Name 12/03/20  1357       Financial/Legal    Who Manages Finances if Patient Unable  No living will or HCS        Substance Abuse    No documentation.       Patient Forms    No documentation.           Hiral Norman RN

## 2020-12-04 LAB
ALBUMIN SERPL-MCNC: 3.3 G/DL (ref 3.5–5.2)
ALBUMIN/GLOB SERPL: 1.2 G/DL
ALP SERPL-CCNC: 103 U/L (ref 39–117)
ALT SERPL W P-5'-P-CCNC: 11 U/L (ref 1–33)
ANION GAP SERPL CALCULATED.3IONS-SCNC: 9.7 MMOL/L (ref 5–15)
AST SERPL-CCNC: 18 U/L (ref 1–32)
BASOPHILS # BLD AUTO: 0.02 10*3/MM3 (ref 0–0.2)
BASOPHILS NFR BLD AUTO: 0.3 % (ref 0–1.5)
BILIRUB SERPL-MCNC: 0.2 MG/DL (ref 0–1.2)
BUN SERPL-MCNC: 36 MG/DL (ref 8–23)
BUN/CREAT SERPL: 34.3 (ref 7–25)
CALCIUM SPEC-SCNC: 9.1 MG/DL (ref 8.6–10.5)
CHLORIDE SERPL-SCNC: 112 MMOL/L (ref 98–107)
CK SERPL-CCNC: 48 U/L (ref 20–180)
CO2 SERPL-SCNC: 18.3 MMOL/L (ref 22–29)
CREAT SERPL-MCNC: 1.05 MG/DL (ref 0.57–1)
CRP SERPL-MCNC: 9.95 MG/DL (ref 0–0.5)
DEPRECATED RDW RBC AUTO: 46.8 FL (ref 37–54)
EOSINOPHIL # BLD AUTO: 0 10*3/MM3 (ref 0–0.4)
EOSINOPHIL NFR BLD AUTO: 0 % (ref 0.3–6.2)
ERYTHROCYTE [DISTWIDTH] IN BLOOD BY AUTOMATED COUNT: 15.1 % (ref 12.3–15.4)
FERRITIN SERPL-MCNC: 427 NG/ML (ref 13–150)
GFR SERPL CREATININE-BSD FRML MDRD: 52 ML/MIN/1.73
GLOBULIN UR ELPH-MCNC: 2.7 GM/DL
GLUCOSE SERPL-MCNC: 107 MG/DL (ref 65–99)
HCT VFR BLD AUTO: 53.9 % (ref 34–46.6)
HGB BLD-MCNC: 18.4 G/DL (ref 12–15.9)
IMM GRANULOCYTES # BLD AUTO: 0.06 10*3/MM3 (ref 0–0.05)
IMM GRANULOCYTES NFR BLD AUTO: 1 % (ref 0–0.5)
LYMPHOCYTES # BLD AUTO: 0.54 10*3/MM3 (ref 0.7–3.1)
LYMPHOCYTES NFR BLD AUTO: 9 % (ref 19.6–45.3)
MCH RBC QN AUTO: 30.3 PG (ref 26.6–33)
MCHC RBC AUTO-ENTMCNC: 34.1 G/DL (ref 31.5–35.7)
MCV RBC AUTO: 88.7 FL (ref 79–97)
MONOCYTES # BLD AUTO: 0.35 10*3/MM3 (ref 0.1–0.9)
MONOCYTES NFR BLD AUTO: 5.9 % (ref 5–12)
NEUTROPHILS NFR BLD AUTO: 5.01 10*3/MM3 (ref 1.7–7)
NEUTROPHILS NFR BLD AUTO: 83.8 % (ref 42.7–76)
NRBC BLD AUTO-RTO: 0.2 /100 WBC (ref 0–0.2)
PLATELET # BLD AUTO: 172 10*3/MM3 (ref 140–450)
PMV BLD AUTO: 10 FL (ref 6–12)
POTASSIUM SERPL-SCNC: 4.2 MMOL/L (ref 3.5–5.2)
PROT SERPL-MCNC: 6 G/DL (ref 6–8.5)
RBC # BLD AUTO: 6.08 10*6/MM3 (ref 3.77–5.28)
SODIUM SERPL-SCNC: 140 MMOL/L (ref 136–145)
WBC # BLD AUTO: 5.98 10*3/MM3 (ref 3.4–10.8)

## 2020-12-04 PROCEDURE — 82728 ASSAY OF FERRITIN: CPT | Performed by: INTERNAL MEDICINE

## 2020-12-04 PROCEDURE — 85025 COMPLETE CBC W/AUTO DIFF WBC: CPT | Performed by: INTERNAL MEDICINE

## 2020-12-04 PROCEDURE — 82550 ASSAY OF CK (CPK): CPT | Performed by: INTERNAL MEDICINE

## 2020-12-04 PROCEDURE — 94799 UNLISTED PULMONARY SVC/PX: CPT

## 2020-12-04 PROCEDURE — 25010000002 ENOXAPARIN PER 10 MG: Performed by: INTERNAL MEDICINE

## 2020-12-04 PROCEDURE — 80053 COMPREHEN METABOLIC PANEL: CPT | Performed by: INTERNAL MEDICINE

## 2020-12-04 PROCEDURE — 86140 C-REACTIVE PROTEIN: CPT | Performed by: INTERNAL MEDICINE

## 2020-12-04 PROCEDURE — 63710000001 DEXAMETHASONE PER 0.25 MG: Performed by: INTERNAL MEDICINE

## 2020-12-04 RX ADMIN — DEXAMETHASONE 6 MG: 4 TABLET ORAL at 08:23

## 2020-12-04 RX ADMIN — ENOXAPARIN SODIUM 40 MG: 40 INJECTION SUBCUTANEOUS at 17:02

## 2020-12-04 RX ADMIN — FOLIC ACID 1000 MCG: 1 TABLET ORAL at 08:24

## 2020-12-04 RX ADMIN — ROSUVASTATIN CALCIUM 20 MG: 20 TABLET, FILM COATED ORAL at 20:09

## 2020-12-04 RX ADMIN — BUDESONIDE AND FORMOTEROL FUMARATE DIHYDRATE 2 PUFF: 160; 4.5 AEROSOL RESPIRATORY (INHALATION) at 20:20

## 2020-12-04 RX ADMIN — LEVOTHYROXINE SODIUM 25 MCG: 25 TABLET ORAL at 08:23

## 2020-12-04 RX ADMIN — Medication 1000 MCG: at 08:24

## 2020-12-04 RX ADMIN — METOPROLOL SUCCINATE 25 MG: 25 TABLET, EXTENDED RELEASE ORAL at 08:24

## 2020-12-04 RX ADMIN — Medication 1000 UNITS: at 08:23

## 2020-12-04 RX ADMIN — ENOXAPARIN SODIUM 40 MG: 40 INJECTION SUBCUTANEOUS at 05:57

## 2020-12-04 RX ADMIN — HYDROCODONE BITARTRATE AND ACETAMINOPHEN 1 TABLET: 5; 325 TABLET ORAL at 12:18

## 2020-12-04 RX ADMIN — AMLODIPINE BESYLATE 5 MG: 5 TABLET ORAL at 08:23

## 2020-12-04 RX ADMIN — BUDESONIDE AND FORMOTEROL FUMARATE DIHYDRATE 2 PUFF: 160; 4.5 AEROSOL RESPIRATORY (INHALATION) at 06:48

## 2020-12-04 RX ADMIN — REMDESIVIR 100 MG: 100 INJECTION, POWDER, LYOPHILIZED, FOR SOLUTION INTRAVENOUS at 12:09

## 2020-12-04 RX ADMIN — OXYCODONE HYDROCHLORIDE AND ACETAMINOPHEN 2000 MG: 500 TABLET ORAL at 08:23

## 2020-12-04 NOTE — PLAN OF CARE
Goal Outcome Evaluation:  Plan of Care Reviewed With: patient  Progress: no change  Outcome Summary: Vitals as charted, c/o headache that was treated w/ PRN pain meds, A&Ox4, NC 5L, up ad yana, will continue to monitor.

## 2020-12-04 NOTE — PLAN OF CARE
Goal Outcome Evaluation:  Plan of Care Reviewed With: patient  Progress: improving  Outcome Summary: Tollerates 4 lpm/NC well no complaints of pain during shift. VSS, will continue to monitor.

## 2020-12-04 NOTE — PROGRESS NOTES
[facility] HOSPITALIST    PROGRESS NOTE    Name:  Leonard Best   Age:  70 y.o.  Sex:  female  :  1950  MRN:  [id number]   Visit Number:  [id number]  Admission Date:  2020  Date Of Service:  20  Primary Care Physician:  Felicia Gloria PA-C     LOS: 2 days :  Patient Care Team:  Felicia Gloria PA-C as PCP - General  Felicia Gloria PA-C as PCP - Family Medicine  Kvng Saldivar MD as Consulting Physician (Nephrology)  Murtaza Woods MD as Consulting Physician (Cardiology)  Denis Worley MD as Consulting Physician (Urology):    History taken from:     patient chart    Chief Complaint:      Dyspnea    Subjective     Interval History:     Patient seen and examined again today.    Patient is a 70-year-old female with history of paroxysmal SVT who comes in with shortness of breath, fatigue, nausea, vomiting, diarrhea.  This has been going on since Saturday she states.  She is noted to have hypoxia.  Chest x-ray did show right-sided pneumonia.  Patient was admitted and placed on Decadron as well as remdesivir.  Patient is increased her fluid intake, her creatinine is improved.    Patient states she feels better.  She still has dyspnea, worse on exertion.  She denies any chest pressure, nausea, vomiting, diarrhea at present.  5 L of O2.  Reviewed inflammatory markers, CRP is improved.    Review of Systems:     All systems were reviewed and negative except for:  Respiratory: positive for  cough, dry and shortness of air    Objective     Vital Signs:    Temp:  [96.4 °F (35.8 °C)-97.1 °F (36.2 °C)] 96.5 °F (35.8 °C)  Heart Rate:  [70-76] 70  Resp:  [16-20] 18  BP: (135-147)/(87-98) 147/98    Physical Exam:    General: Alert and oriented x4, mild distress.  Heart: Regular rate and rhythm without murmur rub or thrill  Lungs: Rhonchi noted on the right.  No use of accessory muscles respiration.  Abdomen: Soft/nontender/nondistended.  No paraspinal megaly is noted.  MSK:  4/5 strength in upper/lower extremities bilaterally.     Results Review:      I reviewed the patient's new clinical results.    Labs:    Lab Results (last 24 hours)     Procedure Component Value Units Date/Time    Blood Culture - Blood, Arm, Left [[id number]] Collected: 12/03/20 1059    Specimen: Blood from Arm, Left Updated: 12/04/20 1146     Blood Culture No growth at 24 hours    Blood Culture - Blood, Hand, Right [[id number]] Collected: 12/03/20 1100    Specimen: Blood from Hand, Right Updated: 12/04/20 1146     Blood Culture No growth at 24 hours    Ferritin [[id number]]  (Abnormal) Collected: 12/04/20 0521    Specimen: Blood Updated: 12/04/20 0710     Ferritin 427.00 ng/mL     Narrative:      Results may be falsely decreased if patient taking Biotin.      Comprehensive Metabolic Panel [[id number]]  (Abnormal) Collected: 12/04/20 0521    Specimen: Blood Updated: 12/04/20 0706     Glucose 107 mg/dL      BUN 36 mg/dL      Creatinine 1.05 mg/dL      Sodium 140 mmol/L      Potassium 4.2 mmol/L      Chloride 112 mmol/L      CO2 18.3 mmol/L      Calcium 9.1 mg/dL      Total Protein 6.0 g/dL      Albumin 3.30 g/dL      ALT (SGPT) 11 U/L      AST (SGOT) 18 U/L      Alkaline Phosphatase 103 U/L      Total Bilirubin 0.2 mg/dL      eGFR Non African Amer 52 mL/min/1.73      Globulin 2.7 gm/dL      A/G Ratio 1.2 g/dL      BUN/Creatinine Ratio 34.3     Anion Gap 9.7 mmol/L     Narrative:      GFR Normal >60  Chronic Kidney Disease <60  Kidney Failure <15      CK [[id number]]  (Normal) Collected: 12/04/20 0521    Specimen: Blood Updated: 12/04/20 0705     Creatine Kinase 48 U/L     C-reactive Protein [[id number]]  (Abnormal) Collected: 12/04/20 0521    Specimen: Blood Updated: 12/04/20 0705     C-Reactive Protein 9.95 mg/dL     CBC & Differential [[id number]]  (Abnormal) Collected: 12/04/20 0521    Specimen: Blood Updated: 12/04/20 0647    Narrative:      The following orders were created for panel order CBC &  Differential.  Procedure                               Abnormality         Status                     ---------                               -----------         ------                     CBC Auto Differential[[id number]]        Abnormal            Final result                 Please view results for these tests on the individual orders.    CBC Auto Differential [[id number]]  (Abnormal) Collected: 12/04/20 0521    Specimen: Blood Updated: 12/04/20 0647     WBC 5.98 10*3/mm3      RBC 6.08 10*6/mm3      Hemoglobin 18.4 g/dL      Hematocrit 53.9 %      MCV 88.7 fL      MCH 30.3 pg      MCHC 34.1 g/dL      RDW 15.1 %      RDW-SD 46.8 fl      MPV 10.0 fL      Platelets 172 10*3/mm3      Neutrophil % 83.8 %      Lymphocyte % 9.0 %      Monocyte % 5.9 %      Eosinophil % 0.0 %      Basophil % 0.3 %      Immature Grans % 1.0 %      Neutrophils, Absolute 5.01 10*3/mm3      Lymphocytes, Absolute 0.54 10*3/mm3      Monocytes, Absolute 0.35 10*3/mm3      Eosinophils, Absolute 0.00 10*3/mm3      Basophils, Absolute 0.02 10*3/mm3      Immature Grans, Absolute 0.06 10*3/mm3      nRBC 0.2 /100 WBC            Radiology:    Imaging Results (Last 24 Hours)     ** No results found for the last 24 hours. **          Medication Review:     amLODIPine, 5 mg, Oral, Daily  budesonide-formoterol, 2 puff, Inhalation, BID - RT  cholecalciferol, 1,000 Units, Oral, Daily  dexamethasone, 6 mg, Oral, Daily With Breakfast  enoxaparin, 40 mg, Subcutaneous, Q12H  folic acid, 1,000 mcg, Oral, Daily  levothyroxine, 25 mcg, Oral, Once per day on Mon Tue Wed Thu Fri Sat  metoprolol succinate XL, 25 mg, Oral, Q24H  remdesivir, 100 mg, Intravenous, Q24H  rosuvastatin, 20 mg, Oral, Daily  vitamin B-12, 1,000 mcg, Oral, Daily  vitamin C, 2,000 mg, Oral, Daily        Pharmacy Consult - Remdesivir,         Assessment/Plan     Problem List Items Addressed This Visit        Other    Pneumonia due to COVID-19 virus - Primary      Other Visit Diagnoses     Acute  hypoxemic respiratory failure (CMS/HCC)              1.  Covid-19 pneumonia, present on admission  2.  Acute respiratory failure with hypoxia  3.  Paroxysmal SVT, restart beta-blocker  4.  High anion gap metabolic acidosis, due to dehydration, this is resolved.  5.  Chronic kidney disease stage IIIb  6.  Hypothyroidism, continue Synthroid.  7.  Chronic diastolic congestive heart failure, stable.  8.  Essential hypertension, uncontrolled    Plan:    We will continue with Decadron and remdesivir.  Titrate oxygen as needed.  Restart home medications.  Monitor inflammatory markers.  Monitor oxygen status closely.  Lovenox for DVT prophylaxis.  Further recommendations will depend on the clinical course.    Seb Worley,   12/04/20  14:18 EST

## 2020-12-04 NOTE — PAYOR COMM NOTE
"Michael Best (70 y.o. Female)     PLEASE SEE ATTACHED CLINICAL REVIEW.     REF#[id number]    PLEASE CALL   OR  [phone] WITH INPT AUTH AND DAYS APPROVED.     THANK YOU    ELLEN MASON LPN CCP    Date of Birth Social Security Number Address Home Phone MRN    1950  [id number] [facility] [address] [phone] [id number]    Confucianist Marital Status          None        Admission Date Admission Type Admitting Provider Attending Provider Department, Room/Bed    12/2/20 Emergency Melissa Valadez MD Hinsberg, Francis J, DO [address], [address]/1    Discharge Date Discharge Disposition Discharge Destination                       Attending Provider: Seb Worley DO    Allergies: Sulfa Antibiotics, Ciprofloxacin, Levofloxacin, Macrodantin [Nitrofurantoin]    Isolation: Enh Drop/Con   Infection: COVID (confirmed) (12/02/20)   Code Status: CPR    Ht: 167.6 cm (66\")   Wt: 89.8 kg (197 lb 15.6 oz)    Admission Cmt: None   Principal Problem: None                Active Insurance as of 12/2/2020     Primary Coverage     Payor Plan Insurance Group Employer/Plan Group    [employer] COMMERCIAL AETNA [id number]     Payor Plan Address Payor Plan Phone Number Payor Plan Fax Number Effective Dates    [address] [phone]  1/1/2016 - None Entered    [facility] [id number]       Subscriber Name Subscriber Birth Date Member ID       JORGE BEST 6/27/1957 [id number]           Secondary Coverage     Payor Plan Insurance Group Employer/Plan Group    MEDICARE MEDICARE A & B      Payor Plan Address Payor Plan Phone Number Payor Plan Fax Number Effective Dates    [address] [phone]  6/1/2016 - None Entered    [facility] [address]       Subscriber Name Subscriber Birth Date Member ID       MICHAEL BEST 1950 [id number]                 Emergency Contacts      (Rel.) Home Phone Work Phone Mobile Phone    Marlon Best " (Spouse) [phone] -- [phone]    CAL SAUNDERS (Daughter) [phone] -- --    NasirLiz yeager (Daughter) -- -- --               History & Physical      Melissa Valadez MD at 12/02/20 0018          PCP: Felicia Gloria, JOSIAH    Chief complaint   Chief Complaint   Patient presents with   • Shortness of Breath   • Cough       HPI  Patient is a 70 y.o. FEMALE presents with a history of paroxysmal SVT who presents to the ER due to shortness of air, fatigue, nausea vomiting diarrhea.  Patient states that she has had shortness of air mostly with exertion for the last 3 to 4 days.  She had fatigue and malaise.  She said a headache moderate to severe, Tylenol has not seemed to make it better, fever makes it worse, frontal, no radiation, associated with some nausea and some vomiting and she had a diarrhea in an accident today.  She said decreased oral intake and she can only keep down a little bit of water.  She denies any inhalers or nebulizers or oxygen at home.  She says she is been unable to keep her medications down the last couple days.  Patient denies any chest pain.    After evaluating the patient and while I was still in the ER the nurse came up to me and said that the patient had a few minute run of tachycardia of heart rate into the 170s it was regular and narrow complex and by the time she got her PPE on the patient had converted back.      PAST MEDICAL HISTORY  Past Medical History:   Diagnosis Date   • Disease of thyroid gland    • Elevated cholesterol    • History of indigestion    • History of staph infection 1980    RIGHT LEG 1980;  DENIES MRSA/NO LONGTERM ABX/ISOLATION   • History of transfusion    • Hyperlipidemia     DIET CONTROLLED   • Hypertension    • Kidney stone    • Mitral regurgitation     Trace   • Osteoarthritis of knee    • Osteoarthritis, multiple sites 08/18/2010   • PSVT (paroxysmal supraventricular tachycardia) (CMS/HCC)    • Renal disorder    • Tricuspid regurgitation      Trace   • Vitamin D deficiency        PAST SURGICAL HISTORY  Past Surgical History:   Procedure Laterality Date   • CATARACT EXTRACTION     • FRACTURE SURGERY      left wrist   • JOINT REPLACEMENT     • LAPAROSCOPIC TUBAL LIGATION     • [place] TOTAL KNEE ARTHROPLASTY Right 8/15/2016    Procedure: RT TOTAL KNEE ARTHROPLASTY;  Surgeon: Marlon Wills MD;  Location: [facility];  Service: Orthopedics   • THYROID SURGERY      thyroid nodule removed--removed about a third of her thyroid   • TONSILLECTOMY     • WRIST SURGERY Left 2003       FAMILY HISTORY  Family History   Problem Relation Age of Onset   • Cirrhosis Father        SOCIAL HISTORY  Social History     Tobacco Use   • Smoking status: Former Smoker     Packs/day: 1.00     Years: 20.00     Pack years: 20.00     Types: Cigarettes     Quit date: 2015     Years since quittin.7   • Smokeless tobacco: Never Used   • Tobacco comment: CAFFEINE USE   Substance Use Topics   • Alcohol use: Yes     Alcohol/week: 1.0 standard drinks     Types: 1 Glasses of wine per week     Comment: OCCASIONAL   • Drug use: No       MEDICATIONS:  Medications Prior to Admission   Medication Sig Dispense Refill Last Dose   • amLODIPine (NORVASC) 5 MG tablet Take 1 tablet by mouth Daily. For BP 90 tablet 1 Past Week at Unknown time   • Cholecalciferol 1.25 MG (73506 UT) tablet One PO once weekly 5 tablet 11 Past Week at Unknown time   • Cyanocobalamin (VITAMIN B-12) 1000 MCG sublingual tablet DISSOLVE 1 LOZENGE BY MOUTH ONCE DAILY 30 each 0 Past Week at Unknown time   • folic acid (FOLVITE) 1 MG tablet Take 1 tablet by mouth once daily 90 tablet 0 Past Week at Unknown time   • levothyroxine (SYNTHROID, LEVOTHROID) 25 MCG tablet Take 1 tablet by mouth Daily. One PO daily before breakfast for thyroid.  Skip  dose. 30 tablet 11 Past Week at Unknown time   • metoprolol succinate XL (TOPROL-XL) 25 MG 24 hr tablet Take 1 tablet by mouth Daily. For heart and BP 90 tablet 1 Past Week  "at Unknown time   • rosuvastatin (CRESTOR) 20 MG tablet TAKE 1 TABLET BY MOUTH ONCE DAILY FOR CHOLESTEROL 30 tablet 0 Past Week at Unknown time       Allergies:  Sulfa antibiotics, Ciprofloxacin, Levofloxacin, and Macrodantin [nitrofurantoin]    Review of Systems:   fevers, fatigue  shortness of air  diarrhea, nausea, vomiting,  Negative for following (except as per HPI):  Constitution: chills,  Eyes: change of vision, loss of vision and discharge  ENT: ear drainage, ear ringing and facial trauma  Respiratory: cough, pleuritic pain,   Cardiovascular: chest pressure, pain, lower extremity edema, palpitations  Gastrointestinal: constipation,  pain    Integument: rash and wound  Hematologic / Lymphatic: excessive bleeding and easy bruising  Musculoskeletal: joint pain, joint stiffness, joint swelling and muscle pain  Neurological: , numbness, seizures and tremors  Behavioral / Psych: anxiety, depression and hallucinations         Vital Signs  Temp:  [97.4 °F (36.3 °C)-99.1 °F (37.3 °C)] 97.4 °F (36.3 °C)  Heart Rate:  [] 84  Resp:  [16-20] 20  BP: (157-163)/() 157/110   SpO2 Readings from Last 3 Encounters:   12/02/20 92.% on 2L 02           .result    Flowsheet Rows      First Filed Value   Admission Height  167.6 cm (66\") Documented at 12/02/2020 1205   Admission Weight  81.6 kg (180 lb) Documented at 12/02/2020 1205         Body mass index is 31.99 kg/m².    Physical Exam:  General Appearance:    Alert, cooperative, in mild respiratory distress and speaking in short sentences   Head:    Normocephalic, without obvious abnormality, atraumatic   Eyes:         conjunctivae and sclerae normal, no icterus, PERRLA   ENT:    Ears grossly intact, oral mucosa dry, nasal cannula, mask   Neck:   No adenopathy, supple, trachea midline,    Back:     Normal to inspection, range of motion normal   Lungs:    Occasional rhonchi, decreased in the bases, tachypneic, expiratory wheezing bilaterally,regular respirations even " and        mildly labored    Heart:   Tachycardic but regular,  no murmur, normal S1 and S2,   Abdomen:     Normal bowel sounds, no masses,  soft non-tender, non-distended, obese   Extremities:   Moves all extremities well, no cyanosis, no edema,             Pulses:   Pulses palpable and equal bilaterally   Skin:   No bleeding, rash, bruising    Neurologic:    Psych:   Cranial nerves 2 - 12 grossly intact, sensation grossly intact,     Moves all extremities well, equal bilateral strength4/5    Alert and Oriented x 3, Normal Affect     I used full protective equipment while examining this patient.  This includes face mask /protective eyewear and protective gown when appropriate.  I washed my hands before entering the room and immediately upon leaving the room.         Results Review:   I reviewed the patient's new clinical results.  Discussed with ER physician  I personally viewed and interpreted the patient's EKG/Telemetry data sinus rhythm but then patient had a couple minute run of SVT into the 170s regular narrow complex  Old records reviewed   Echo and September 2015 showed EF of 62% and diastolic dysfunction grade 1    ASSESSMENT AND PLAN         ·  Acute respiratory failure with hypoxia /  Pneumonia due to COVID-19 virus/ Sepsis  -Monitor patient closely,  Monitor continuous O2 saturation  - symptom day  4-5     -Hydrate- IVF  -Encourage hydration, and nutrition- protein supplement drinks  -Antipyretics and analgesics ordered, PRN cough medication, michelle-burger muscle aches, ice and tylenol for HA  --Protein drinks for nutritional supplement advance diet as tolerated  -Daily CK, CRP, ferritin  -Initial evaluation with LDH, troponin, and monitor EKG for QTC-if abnormal will recheck   -Every other day monitor PTT/PT/fibrinogen/d-dimer  -Albuterol MDI as needed; Inhaled steroid BID vale since patient had wheezing  -Oxygen supplementation to keep sats >94   -sats <94% so started oral steroids, and consider patient  requiring 2L 02 so IV remdesivir ordered sat  -lovenox Sub Q bid for microemboli prophylaxis    ·   PSVT (paroxysmal supraventricular tachycardia)   -Patient has a history of paroxysmal SVT.  And patient has not had her medications in a couple days due to the nausea vomiting.  Therefore will hydrate patient and restart her beta-blockers.  Monitor troponins    · Nausea vomiting and diarrhea  -Likely due to Covid.  Patient could have nausea vomiting due to pain.  And diarrhea has been common with Covid patients during the course  -IV fluids, antiemetics, advance diet as tolerated    · Evaded anion gap metabolic acidosis  -Due to dehydration, starvation, and poor renal function  -IV fluids, feed patient as she tolerates.    ·  CKD (chronic kidney disease) stage 4, GFR 15-29 ml/min   -Stable, creatinine clearance around 37 currently.  Just medications as needed.    ·  Hypothyroidism, acquired  -Stable, continue Synthroid.  Patient takes it Monday through Saturday    · Chronic diastolic heart failure  -Monitor daily weights and DC fluids when appropriate      +DVT proph: lovenox twice daily  + Full code    I discussed the patients findings and my recommendations with the patient and/or family.  Please reference all orders placed.  Critical care time [id number], [id number], [id number]  Melissa Valadez MD  12/02/20  18:33 EST        Electronically signed by Melissa Valadez MD at 12/02/20 2025          Emergency Department Notes      Nilsa Rowley, RN at 12/02/20 1142        Was seen at [facility] and DX with COVID prior to arrival. C/O SOA and Cough     Mask placed on patient in triage. Triage staff wore appropriate PPE during interaction with patient.       Electronically signed by Nilsa Rowley RN at 12/02/20 1143     Ny Herrera RN at 12/02/20 1149        Pt noted to have mask on when this RN entered the room.  This RN wore appropriate PPE throughout our encounter. Hand hygiene performed upon  entering and exiting room.       Ny Herrera RN  12/02/20 1149      Electronically signed by Ny Herrera RN at 12/02/20 1149     Eduardo Walsh MD at 12/02/20 1159           EMERGENCY DEPARTMENT ENCOUNTER    Room Number:  S502/1  Date of encounter:  12/2/2020  PCP: Felicia Gloria, PAEvgenyC  Historian: Patient      HPI:  Chief Complaint: Headache, shortness of breath, COVID-19 positive  A complete HPI/ROS/PMH/PSH/SH/FH are unobtainable due to: N/A    Context: Leonard Best is a 70 y.o. female who presents to the ED c/o headache and dyspnea on exertion for the past 3 to 4 days.  She was at an immediate care center earlier today and tested positive for COVID-19 (confirmed via epic).  Headache is severe, dyspnea is moderate-worsened with exertion.  She has had a dry nonproductive cough.  Not much in the way of fevers and chills.  Mild myalgias/arthralgias.  She also reports nausea, vomiting and diarrhea.  She is a former smoker with no prior diagnosis of emphysema/COPD.  She thinks she got COVID-19 from her  who in turn contracted it from his brother.    The patient was placed in a mask in triage, hand hygiene was performed before and after my interaction with the patient.  I wore a mask, safety glasses and gloves during my entire interaction with the patient.    PAST MEDICAL HISTORY  Active Ambulatory Problems     Diagnosis Date Noted   • Hypertension 12/30/2015   • Right knee pain 12/30/2015   • Hyperlipidemia 07/11/2016   • Osteoarthritis, multiple sites 08/18/2010   • Primary osteoarthritis of right knee 08/15/2016   • Vitamin D deficiency 01/09/2018   • PSVT (paroxysmal supraventricular tachycardia) (CMS/[facility]) 02/15/2018   • Hypothyroidism, acquired 07/20/2019   • Folic acid deficiency 07/20/2019   • Low serum vitamin B12 07/20/2019   • Acute kidney failure (CMS/[facility]) 09/05/2019   • Hydronephrosis 09/05/2019   • Kidney stone 09/05/2019   • CKD (chronic kidney disease) stage 4, GFR 15-29 ml/min (CMS/[facility])  2020     Resolved Ambulatory Problems     Diagnosis Date Noted   • No Resolved Ambulatory Problems     Past Medical History:   Diagnosis Date   • History of indigestion    • History of staph infection    • History of transfusion    • Mitral regurgitation    • Osteoarthritis of knee    • Renal disorder    • Tricuspid regurgitation          PAST SURGICAL HISTORY  Past Surgical History:   Procedure Laterality Date   • CATARACT EXTRACTION     • FRACTURE SURGERY      left wrist   • LAPAROSCOPIC TUBAL LIGATION     • NV TOTAL KNEE ARTHROPLASTY Right 8/15/2016    Procedure: RT TOTAL KNEE ARTHROPLASTY;  Surgeon: Marlno Wills MD;  Location: [facility];  Service: Orthopedics   • THYROID SURGERY      thyroid nodule removed--removed about a third of her thyroid   • TONSILLECTOMY     • WRIST SURGERY Left 2003         FAMILY HISTORY  Family History   Problem Relation Age of Onset   • Cirrhosis Father          SOCIAL HISTORY  Social History     Socioeconomic History   • Marital status:      Spouse name: Not on file   • Number of children: Not on file   • Years of education: Not on file   • Highest education level: Not on file   Tobacco Use   • Smoking status: Former Smoker     Packs/day: 1.00     Years: 20.00     Pack years: 20.00     Types: Cigarettes     Quit date: 2015     Years since quittin.7   • Smokeless tobacco: Never Used   • Tobacco comment: CAFFEINE USE   Substance and Sexual Activity   • Alcohol use: Yes     Alcohol/week: 1.0 standard drinks     Types: 1 Glasses of wine per week     Comment: OCCASIONAL   • Drug use: No   • Sexual activity: Defer     Birth control/protection: None         ALLERGIES  Sulfa antibiotics, Ciprofloxacin, Levofloxacin, and Macrodantin [nitrofurantoin]        REVIEW OF SYSTEMS  Review of Systems   Constitutional: Positive for fatigue. Negative for fever.   HENT: Negative for sore throat.    Eyes: Negative.    Respiratory: Positive for cough and shortness of  breath.    Cardiovascular: Negative for chest pain.   Gastrointestinal: Positive for diarrhea, nausea and vomiting. Negative for abdominal pain.   Genitourinary: Negative for dysuria.   Musculoskeletal: Negative for neck pain.   Skin: Negative for rash.   Allergic/Immunologic: Negative.    Neurological: Positive for headaches. Negative for weakness and numbness.   Hematological: Negative.    Psychiatric/Behavioral: Negative.    All other systems reviewed and are negative.       All systems reviewed and negative except for those discussed in HPI.       PHYSICAL EXAM    I have reviewed the triage vital signs and nursing notes.    ED Triage Vitals [12/02/20 1144]   Temp Heart Rate Resp BP SpO2   99.1 °F (37.3 °C) 114 20 160/100 93 %      Temp src Heart Rate Source Patient Position BP Location FiO2 (%)   Tympanic -- -- -- --       Physical Exam   Constitutional: Pt. is oriented to person, place, and time and well-developed, well-nourished, and in no distress. No distress.   HENT: Normocephalic and atraumatic,  EOM are normal. Pupils are equal, round, and reactive to light. Oropharynx moist/nonerythematous.  Neck: Normal range of motion. Neck supple. No JVD present. No tracheal deviation present. No thyromegaly present.   Cardiovascular: Normal rate, regular rhythm and normal heart sounds. Exam reveals no gallop and no friction rub.   No murmur heard.  Pulmonary/Chest: She is slightly tachypneic.  No stridor or wheezing.  She has rhonchi in the right lower lobe.  No rales.  Abdominal: Soft. Bowel sounds are normal. No distension. There is no tenderness. There is no rebound and no guarding.   Musculoskeletal: Normal range of motion. No edema, tenderness or deformity.   Neurological: Pt. is alert and oriented to person, place, and time. Pt. has normal sensation and normal strength. No cranial nerve deficit. GCS score is 15.   Skin: Skin is warm and dry. No rash noted. Pt. is not diaphoretic. No erythema.   Psychiatric:  Mood, affect and judgment normal.   Nursing note and vitals reviewed.      RADIOLOGY  Xr Chest Ap    Result Date: 12/2/2020  CHEST SINGLE VIEW  HISTORY: Shortness of air, cough, COVID-19 positive.  COMPARISON: 2-view chest 12/22/2017.  FINDINGS: There is hazy peripheral predominant infiltrates within the right mid to lower lung consistent with COVID-19 infiltrate. The heart and mediastinal structures appear within normal limits. No perihilar edema is evident. Aortic vascular calcifications are present.      Hazy peripheral predominant infiltrates within the right mid to lower lung consistent with COVID-19 infiltrates.  This report was finalized on 12/2/2020 1:01 PM by Dr. Flako Clements M.D.        I ordered the above noted radiological studies. Reviewed by me and discussed with radiologist.  See dictation for official radiology interpretation.      PROCEDURES    Procedures      MEDICATIONS GIVEN IN ER    Medications   sodium chloride 0.9 % flush 10 mL (has no administration in time range)   sodium chloride 0.9 % infusion (125 mL/hr Intravenous Currently Infusing 12/2/20 1507)   acetaminophen (TYLENOL) tablet 650 mg (has no administration in time range)   budesonide-formoterol (SYMBICORT) 160-4.5 MCG/ACT inhaler 2 puff (2 puffs Inhalation Not Given 12/2/20 1624)   dexamethasone (DECADRON) tablet 6 mg (has no administration in time range)   enoxaparin (LOVENOX) syringe 40 mg (has no administration in time range)   remdesivir 200 mg in sodium chloride 0.9 % 250 mL IVPB (powder vial) (has no administration in time range)     Followed by   remdesivir 100 mg in sodium chloride 0.9 % 250 mL IVPB (powder vial) (has no administration in time range)   Pharmacy Consult - Remdesivir (has no administration in time range)   ondansetron (ZOFRAN) injection 4 mg (has no administration in time range)   HYDROcodone-acetaminophen (NORCO) 5-325 MG per tablet 1 tablet (has no administration in time range)   metoprolol succinate XL  (TOPROL-XL) 24 hr tablet 25 mg (has no administration in time range)   acetaminophen (TYLENOL) tablet 1,000 mg (1,000 mg Oral Given 12/2/20 1207)   dexamethasone sodium phosphate injection 6 mg (6 mg Intravenous Given 12/2/20 1453)   morphine injection 4 mg (4 mg Intravenous Given 12/2/20 1450)   ondansetron (ZOFRAN) injection 4 mg (4 mg Intravenous Given 12/2/20 1450)   sodium chloride 0.9 % bolus 1,000 mL (1,000 mL Intravenous New Bag 12/2/20 1507)         PROGRESS, DATA ANALYSIS, CONSULTS, AND MEDICAL DECISION MAKING    Any/all labs have been independently reviewed by me.  Any/all radiology studies have been reviewed by me and discussed with radiologist dictating the report.   EKG's independently viewed and interpreted by me.  Discussion below represents my analysis of pertinent findings related to patient's condition, differential diagnosis, treatment plan and final disposition.      ED Course as of Dec 02 1626   Wed Dec 02, 2020   1220 EKG performed at 1213 and interpreted by me shows sinus tachycardia with a heart rate of 107 bpm.  The HI interval QRS complexes are normal.  There are nonspecific ST-T changes.  Rate has improved when compared to 12/22/2017-at that time she was in rapid atrial fibrillation.    [WC]   1250 pH, Arterial: 7.383 [WC]   1250 pCO2, Arterial(!): 27.1 [WC]   1250 pO2, Arterial(!): 79.5 [WC]   1250 HCO3, Arterial(!): 16.2 [WC]   1250 Flow Rate: 1 [WC]   1250 WBC(!): 12.09 [WC]   1250 Hemoglobin(!): 16.1 [WC]   1250 Hematocrit(!): 48.5 [WC]   1250 Platelets: 325 [WC]   1307 Chest x-ray independently viewed by me and interpreted by radiologist-see dictated report for official interpretation.  Briefly, there infiltrates in the right lower lobe consistent with a viral pneumonia.    [WC]   1310 Lactate: 1.9 [WC]   1316 C-Reactive Protein(!): 28.19 [WC]   1316 LDH(!): 318 [WC]   1316 BUN(!): 33 [WC]   1316 Creatinine(!): 1.61 [WC]   1318 Ferritin(!): 436.00 [WC]   1326 Procalcitonin(!): 0.26  [WC]   1345 Case discussed with Dr. Melissa Valadez ([facility])-she agrees to admit the patient to a telemetry bed.    [WC]      ED Course User Index  [WC] Eduardo Walsh MD       AS OF 16:26 EST VITALS:    BP - 162/96  HR - 83  TEMP - 99.1 °F (37.3 °C) (Tympanic)  02 SATS - 95%        DIAGNOSIS  Final diagnoses:   Pneumonia due to COVID-19 virus   Acute hypoxemic respiratory failure (CMS/HCC)         DISPOSITION  Admitted-telemetry           Eduardo Walsh MD  12/02/20 1627      Electronically signed by Eduardo Walsh MD at 12/02/20 1627     Mackenzie Toussaint, RN at 12/02/20 1322        Liz Cherry cell [phone]    Spoke to daughter liz, updated on current status, tests ordered as of this time. Asked to call back for update approx 2 hours. Identity and access code used for verification. placn to be admitted for covid. States her  was covid +       Mackenzie Toussaint, RN  12/02/20 1323       Mackenzie Toussaint, RN  12/02/20 1324      Electronically signed by Mackenzie Toussaint, RN at 12/02/20 1324     Ny Herrera, RN at 12/02/20 1529          Nursing report ED to floor  Leonard Best  70 y.o.  female    HPI (triage note):   Chief Complaint   Patient presents with   • Shortness of Breath   • Cough       Admitting doctor:   Melissa Valadez MD    Admitting diagnosis:   The primary encounter diagnosis was Pneumonia due to COVID-19 virus. A diagnosis of Acute hypoxemic respiratory failure (CMS/HCC) was also pertinent to this visit.    Code status:   Current Code Status     Date Active Code Status Order ID Comments User Context       Prior    Advance Care Planning Activity          Allergies:   Sulfa antibiotics, Ciprofloxacin, Levofloxacin, and Macrodantin [nitrofurantoin]    Weight:       12/02/20  1205   Weight: 81.6 kg (180 lb)       Most recent vitals:   Vitals:    12/02/20 1144 12/02/20 1205 12/02/20 1456   BP: 160/100  (!) 163/102   Pulse: 114 113 94   Resp: 20 20 16   Temp: 99.1 °F  "(37.3 °C)     TempSrc: Tympanic     SpO2: 93% 93% 94%   Weight:  81.6 kg (180 lb)    Height:  167.6 cm (66\")        Active LDAs/IV Access:   Lines, Drains & Airways    Active LDAs     Name:   Placement date:   Placement time:   Site:   Days:    Peripheral IV 12/02/20 1207 Right Antecubital   12/02/20    1207    Antecubital   less than 1            EKG:   ECG 12 Lead   Preliminary Result   HEART RATE= 107  bpm   RR Interval= 564  ms   NV Interval= 136  ms   P Horizontal Axis= 13  deg   P Front Axis= 50  deg   QRSD Interval= 86  ms   QT Interval= 327  ms   QRS Axis= -1  deg   T Wave Axis= -13  deg   - BORDERLINE ECG -   Sinus tachycardia   Borderline T abnormalities, diffuse leads   Electronically Signed By:    Date and Time of Study: 2020-12-02 12:13:16          Meds given in ED:   Medications   sodium chloride 0.9 % flush 10 mL (has no administration in time range)   sodium chloride 0.9 % infusion (125 mL/hr Intravenous Currently Infusing 12/2/20 1507)   sodium chloride 0.9 % bolus 1,000 mL (1,000 mL Intravenous New Bag 12/2/20 1507)   acetaminophen (TYLENOL) tablet 650 mg (has no administration in time range)   budesonide-formoterol (SYMBICORT) 160-4.5 MCG/ACT inhaler 2 puff (has no administration in time range)   dexamethasone (DECADRON) tablet 6 mg (has no administration in time range)   enoxaparin (LOVENOX) syringe 40 mg (has no administration in time range)   remdesivir 200 mg in sodium chloride 0.9 % 250 mL IVPB (powder vial) (has no administration in time range)     Followed by   remdesivir 100 mg in sodium chloride 0.9 % 250 mL IVPB (powder vial) (has no administration in time range)   Pharmacy Consult - Remdesivir (has no administration in time range)   ondansetron (ZOFRAN) injection 4 mg (has no administration in time range)   HYDROcodone-acetaminophen (NORCO) 5-325 MG per tablet 1 tablet (has no administration in time range)   metoprolol succinate XL (TOPROL-XL) 24 hr tablet 25 mg (has no administration " in time range)   acetaminophen (TYLENOL) tablet 1,000 mg (1,000 mg Oral Given 20 1207)   dexamethasone sodium phosphate injection 6 mg (6 mg Intravenous Given 20 1453)   morphine injection 4 mg (4 mg Intravenous Given 20 1450)   ondansetron (ZOFRAN) injection 4 mg (4 mg Intravenous Given 20 1450)       Imaging results:  Xr Chest Ap    Result Date: 2020  Hazy peripheral predominant infiltrates within the right mid to lower lung consistent with COVID-19 infiltrates.  This report was finalized on 2020 1:01 PM by Dr. Flako Clements M.D.        Ambulatory status:   - ad yana    Social issues:   Social History     Socioeconomic History   • Marital status:      Spouse name: Not on file   • Number of children: Not on file   • Years of education: Not on file   • Highest education level: Not on file   Tobacco Use   • Smoking status: Former Smoker     Packs/day: 1.00     Years: 20.00     Pack years: 20.00     Types: Cigarettes     Quit date: 2015     Years since quittin.7   • Smokeless tobacco: Never Used   • Tobacco comment: CAFFEINE USE   Substance and Sexual Activity   • Alcohol use: Yes     Alcohol/week: 1.0 standard drinks     Types: 1 Glasses of wine per week     Comment: OCCASIONAL   • Drug use: No   • Sexual activity: Defer     Birth control/protection: None    Nursing report ED to floor       Young, Ny, RN  20 1530      Electronically signed by Ny Herrera RN at 20 1530       {Outbreak/Travel/Exposure Documentation......;  Question Available Choices Patient Response   Outbreak Screen: Do you currently have a new onset of the following symptoms?        Fever/Chils, Cough, Shortness of air, Loss of taste or smell, No, Unknown  (!) Shortness of Air;Cough (20 1652)   Outbreak Screen: In the last 14 days, have you had contact with anyone who is ill, has show any of the symptoms listed above and/or has been diagnosis with the 2019 Novel Coronavirus? This  includes any immediate household members but excludes any patients with whom you have been in contact within your normal work duties wearing proper PPE, if you are a healthcare worker.  Yes, No, Unknown              (!) Yes (12/02/20 1652)   Outbreak Screen: Who was notified?    Free text  already  covid (12/02/20 1652)   Travel Screen: Have you traveled in the last month? If so, to what country have you traveled? If [place] what state? Yes, No, Unknown  List of all countries  List of all States No (12/02/20 1144)  (not recorded)  (not recorded)   Infection Risk: Do you currently have the following symptoms?  (If cough is selected, the Tuberculosis Screen is performed.) Cough, Fever, Rash, No (!) Cough (12/02/20 1144)   Tuberculosis Screen: Do you have any of the following Tuberculosis Risks?  · Have you lived or spent time with anyone who had or may have TB?  · Have you lived in or visited any of the following areas for more than one month: [place], [place], [place], [place] or [place], the [place] or [place]?  · Do you have HIV/AIDS?  · Have you lived in or worked in a nursing home, homeless shelter, correctional facility, or substance abuse treatment facility?   · No    If Yes do you have any of the following symptoms? Yes responses display to the right    If Yes, symptoms listed are:  Cough greater than or equal to 3 weeks, Loss of appetite, Unexplained weight loss, Night sweats, Bloody sputum or hemoptysis, Hoarseness, Fever, Fatigue, Chest pain, No No (12/02/20 1653)  (not recorded)   Exposure Screen: Have you been exposed to any of these contagious diseases in the last month? Measles, Chickenpox, Meningitis, Pertussis, Whooping Cough, No No (12/02/20 1144)     Oxygen Therapy (since admission)     Date/Time   SpO2   Device (Oxygen Therapy)   Flow (L/min)   Oxygen Concentration (%)   ETCO2 (mmHg)    12/04/20 0648   96   nasal cannula   5   --   --    12/04/20 0501   --   nasal cannula   5   --   --     12/04/20 0006   --   nasal cannula   5   --   --    12/03/20 2310   92   nasal cannula   5   --   --    12/03/20 2016   --   nasal cannula   5   --   --    12/03/20 2015   --   nasal cannula   5   --   --    12/03/20 2003   94   nasal cannula   5   --   --    12/03/20 1605   97   nasal cannula   4.5   --   --    12/03/20 0811   95   nasal cannula   4.5   --   --    12/03/20 0807   --   nasal cannula   4.5   --   --    12/03/20 0803   97   --   --   --   --    12/03/20 0801   95   nasal cannula   4.5   --   --    12/03/20 0400   --   nasal cannula   4   --   --    12/03/20 0000   --   nasal cannula   2   --   --    12/02/20 2350   --   nasal cannula   2   --   --    12/02/20 2000   --   nasal cannula   2   --   --    12/02/20 1922   95   nasal cannula   2   --   --    12/02/20 1800   --   nasal cannula   2   --   --    12/02/20 1634   --   nasal cannula   2   --   --    12/02/20 1552   95   --   --   --   --    12/02/20 14:56:28   94   nasal cannula   2   --   --    12/02/20 1205   93   --   --   --   --    12/02/20 1144   93   room air   --   --   --            Intake & Output (last 2 days)       12/02 0701 - 12/03 0700 12/03 0701 - 12/04 0700 12/04 0701 - 12/05 0700    I.V. (mL/kg) 1000 (11.1)      Total Intake(mL/kg) 1000 (11.1)      Net +1000             Urine Unmeasured Occurrence  1 x         Lines, Drains & Airways    Active LDAs     Name:   Placement date:   Placement time:   Site:   Days:    Peripheral IV 12/03/20 0046 Right Forearm   12/03/20    0046    Forearm   1         Inactive LDAs     Name:   Placement date:   Placement time:   Removal date:   Removal time:   Site:   Days:    [REMOVED] Peripheral IV 12/02/20 1207 Right Antecubital   12/02/20    1207    12/03/20    0100    Antecubital   less than 1                  Medication Administration Report for Leonard Best as of 12/04/20 0762    Legend:    Given Hold Not Given Due Canceled Entry Other Actions    Time Time (Time) Time  Time-Action        Discontinued     Completed     Future     MAR Hold     Linked           Medications 12/02/20 12/03/20 12/04/20    acetaminophen (TYLENOL) tablet 650 mg  Dose: 650 mg  Freq: Every 4 Hours PRN Route: PO  PRN Reasons: Mild Pain ,Moderate Pain ,Headache,Fever  Start: 12/02/20 1825    Admin Instructions:   Do not exceed 4 grams of acetaminophen in a 24 hr period.    If given for pain, use the following pain scale:   Mild Pain = Pain Score of 1-3, CPOT 1-2  Moderate Pain = Pain Score of 4-6, CPOT 3-4  Severe Pain = Pain Score of 7-10, CPOT 5-8  Do not exceed 4 grams of acetaminophen in a 24 hr period.    If given for pain, use the following pain scale:   Mild Pain = Pain Score of 1-3, CPOT 1-2  Moderate Pain = Pain Score of 4-6, CPOT 3-4  Severe Pain = Pain Score of 7-10, CPOT 5-8          Or  acetaminophen (TYLENOL) 160 MG/5ML solution 650 mg  Dose: 650 mg  Freq: Every 4 Hours PRN Route: PO  PRN Reasons: Mild Pain ,Moderate Pain ,Headache,Fever  Start: 12/02/20 1825    Admin Instructions:   Do not exceed 4 grams of acetaminophen in a 24 hr period.    If given for pain, use the following pain scale:   Mild Pain = Pain Score of 1-3, CPOT 1-2  Moderate Pain = Pain Score of 4-6, CPOT 3-4  Severe Pain = Pain Score of 7-10, CPOT 5-8  Do not exceed 4 grams of acetaminophen in a 24 hr period.    If given for pain, use the following pain scale:   Mild Pain = Pain Score of 1-3, CPOT 1-2  Moderate Pain = Pain Score of 4-6, CPOT 3-4  Severe Pain = Pain Score of 7-10, CPOT 5-8          Or  acetaminophen (TYLENOL) suppository 650 mg  Dose: 650 mg  Freq: Every 4 Hours PRN Route: RE  PRN Reasons: Mild Pain ,Moderate Pain ,Fever  Start: 12/02/20 1825    Admin Instructions:   Do not exceed 4 grams of acetaminophen in a 24 hr period.    If given for pain, use the following pain scale:   Mild Pain = Pain Score of 1-3, CPOT 1-2  Moderate Pain = Pain Score of 4-6, CPOT 3-4  Severe Pain = Pain Score of 7-10, CPOT 5-8  Do not exceed 4 grams of  acetaminophen in a 24 hr period.    If given for pain, use the following pain scale:   Mild Pain = Pain Score of 1-3, CPOT 1-2  Moderate Pain = Pain Score of 4-6, CPOT 3-4  Severe Pain = Pain Score of 7-10, CPOT 5-8           acetaminophen (TYLENOL) tablet 650 mg  Dose: 650 mg  Freq: Every 6 Hours PRN Route: PO  PRN Reasons: Mild Pain ,Moderate Pain ,Headache,Fever  Start: 12/02/20 1503    Admin Instructions:   Do not exceed 4 grams of acetaminophen in a 24 hr period.    If given for pain, use the following pain scale:   Mild Pain = Pain Score of 1-3, CPOT 1-2  Moderate Pain = Pain Score of 4-6, CPOT 3-4  Severe Pain = Pain Score of 7-10, CPOT 5-8           albuterol sulfate HFA (PROVENTIL HFA;VENTOLIN HFA;PROAIR HFA) inhaler 2 puff  Dose: 2 puff  Freq: Every 2 Hours PRN Route: IN  PRN Reasons: Wheezing,Shortness of Air  Start: 12/02/20 1914    Admin Instructions:    Shake well.           amLODIPine (NORVASC) tablet 5 mg  Dose: 5 mg  Freq: Daily Route: PO  Start: 12/03/20 0900    Admin Instructions:   Caution: Look alike/sound alike drug alert. Avoid grapefruit juice.      0807          0900             budesonide-formoterol (SYMBICORT) 160-4.5 MCG/ACT inhaler 2 puff  Dose: 2 puff  Freq: 2 Times Daily - RT Route: IN  Start: 12/02/20 1503    Admin Instructions:   SHake well.  Rinse mouth after use, do not swallow water.  Send aerosols to pharmacy in ziplock bag for proper disposal.     [phone] [id number]     2000            calcium carbonate (TUMS) chewable tablet 500 mg (200 mg elemental)  Dose: 2 tablet  Freq: 2 Times Daily PRN Route: PO  PRN Reasons: Indigestion,Heartburn  Start: 12/02/20 1825    Admin Instructions:   Hold if Hypercalcemia  One tablet contains 200 mg elemental calcium.  Take with food.           cholecalciferol (VITAMIN D3) tablet 1,000 Units  Dose: 1,000 Units  Freq: Daily Route: PO  Start: 12/04/20 0900      0900             dexamethasone (DECADRON) tablet 6  mg  Dose: 6 mg  Freq: Daily With Breakfast Route: PO  Start: 12/03/20 0800   End: 12/13/20 0759    Admin Instructions:   Take with food.      0808 0800             enoxaparin (LOVENOX) syringe 40 mg  Dose: 40 mg  Freq: Every 12 Hours Route: SC  Indications of Use: PROPHYLAXIS OF VENOUS THROMBOEMBOLISM  Start: 12/02/20 1600    Admin Instructions:   Give subcutaneous in abdomen only. Do not massage site after injection.     1707          0603     1705         0557     1800            folic acid (FOLVITE) tablet 1,000 mcg  Dose: 1,000 mcg  Freq: Daily Route: PO  Start: 12/02/20 1915 2028          0807          0900             guaifenesin-dextromethorphan (MUCINEX DM) tablet 1 tablet  Dose: 1 tablet  Freq: 2 Times Daily PRN Route: PO  PRN Reasons: Cough,Congestion  Start: 12/02/20 1913    Admin Instructions:   Do not crush, split, or chew.           HYDROcodone-acetaminophen (NORCO) 5-325 MG per tablet 1 tablet  Dose: 1 tablet  Freq: Every 6 Hours PRN Route: PO  PRN Reason: Severe Pain   Start: 12/02/20 1508   End: 12/09/20 1507    Admin Instructions:   [IQRA]    Do not exceed 4 grams of acetaminophen in a 24 hr period.    If given for pain, use the following pain scale:   Mild Pain = Pain Score of 1-3, CPOT 1-2  Moderate Pain = Pain Score of 4-6, CPOT 3-4  Severe Pain = Pain Score of 7-10, CPOT 5-8      1253              levothyroxine (SYNTHROID, LEVOTHROID) tablet 25 mcg  Dose: 25 mcg  Freq: Once per day on Mon Tue Wed Thu Fri Sat Route: PO  Start: 12/03/20 0900    Admin Instructions:   Take on empty stomach.      0807 0900             metoprolol succinate XL (TOPROL-XL) 24 hr tablet 25 mg  Dose: 25 mg  Freq: Every 24 Hours Scheduled Route: PO  Start: 12/02/20 1510    Admin Instructions:   Do not crush or chew.     1510 0808          0900             morphine injection 2 mg  Dose: 2 mg  Freq: Every 4 Hours PRN Route: IV  PRN Reasons: Moderate Pain ,Severe Pain   Start: 12/02/20 7078    End: 12/12/20 1824    Admin Instructions:   If given for pain, use the following pain scale:  Mild Pain = Pain Score of 1-3, CPOT 1-2  Moderate Pain = Pain Score of 4-6, CPOT 3-4  Severe Pain = Pain Score of 7-10, CPOT 5-8          And  naloxone (NARCAN) injection 0.4 mg  Dose: 0.4 mg  Freq: Every 5 Minutes PRN Route: IV  PRN Reason: Respiratory Depression  Start: 12/02/20 1825    Admin Instructions:   If respiratory rate is less than 8 breaths/minute or patient is difficult to arouse stop any narcotics and contact physician.   Administer slow IV push. Repeat as ordered until patient's respiratory rate is greater than 12 breaths/minute.           muscle rub (BenGay) 10-15 % cream  Freq: Every 1 Hour PRN Route: TOP  PRN Comment: sore muscles  Start: 12/02/20 1913    Admin Instructions:   Apply to sore muscles           nitroglycerin (NITROSTAT) SL tablet 0.4 mg  Dose: 0.4 mg  Freq: Every 5 Minutes PRN Route: SL  PRN Reason: Chest Pain  PRN Comment: Only if SBP Greater Than 100  Start: 12/02/20 1825    Admin Instructions:   If Pain Unrelieved After 3 Doses Notify MD           ondansetron (ZOFRAN) tablet 4 mg  Dose: 4 mg  Freq: Every 6 Hours PRN Route: PO  PRN Reasons: Nausea,Vomiting  Start: 12/02/20 1825         Or  ondansetron (ZOFRAN) injection 4 mg  Dose: 4 mg  Freq: Every 6 Hours PRN Route: IV  PRN Reasons: Nausea,Vomiting  Start: 12/02/20 1825          ondansetron (ZOFRAN) injection 4 mg  Dose: 4 mg  Freq: Every 6 Hours PRN Route: IV  PRN Reasons: Nausea,Vomiting  Start: 12/02/20 1508          Pharmacy Consult - Remdesivir  Freq: Continuous PRN Route: XX  PRN Reason: Consult  Start: 12/02/20 1504   End: 12/07/20 1503          remdesivir 200 mg in sodium chloride 0.9 % 250 mL IVPB (powder vial)  Dose: 200 mg  Freq: Every 24 Hours Route: IV  Start: 12/02/20 1700   End: 12/02/20 1807    Admin Instructions:   Ensure all of the drug is administered. Flush line with 30mL NS after administration.     3229               Followed by  remdesivir 100 mg in sodium chloride 0.9 % 250 mL IVPB (powder vial)  Dose: 100 mg  Freq: Every 24 Hours Route: IV  Start: 12/03/20 1300   End: 12/07/20 1259    Admin Instructions:   Ensure all of the drug is administered. Flush line with 30mL NS after administration.      1251          1300             rosuvastatin (CRESTOR) tablet 20 mg  Dose: 20 mg  Freq: Daily Route: PO  Start: 12/02/20 1915    Admin Instructions:   Avoid grapefruit juice.     2027 0807          0900             sodium chloride 0.9 % flush 10 mL  Dose: 10 mL  Freq: As Needed Route: IV  PRN Reason: Line Care  Start: 12/02/20 1151          vitamin B-12 (CYANOCOBALAMIN) tablet 1,000 mcg  Dose: 1,000 mcg  Freq: Daily Route: PO  Start: 12/02/20 1915 2028          0808          0900             vitamin C (ASCORBIC ACID) tablet 2,000 mg  Dose: 2,000 mg  Freq: Daily Route: PO  Start: 12/02/20 2000    2332 [C]          0807          0900            Completed Medications  Medications 12/02/20 12/03/20 12/04/20       acetaminophen (TYLENOL) tablet 1,000 mg  Dose: 1,000 mg  Freq: Once Route: PO  Start: 12/02/20 1154   End: 12/02/20 1207    Admin Instructions:   Do not exceed 4 grams of acetaminophen in a 24 hr period.    If given for pain, use the following pain scale:   Mild Pain = Pain Score of 1-3, CPOT 1-2  Moderate Pain = Pain Score of 4-6, CPOT 3-4  Severe Pain = Pain Score of 7-10, CPOT 5-8     1207               dexamethasone sodium phosphate injection 6 mg  Dose: 6 mg  Freq: Once Route: IV  Start: 12/02/20 1312   End: 12/02/20 1453    Admin Instructions:   For IV administration. May be pushed over a minimum of 1 minute.     1453               morphine injection 4 mg  Dose: 4 mg  Freq: Once Route: IV  Start: 12/02/20 1409   End: 12/02/20 1450    Admin Instructions:   If given for pain, use the following pain scale:  Mild Pain = Pain Score of 1-3, CPOT 1-2  Moderate Pain = Pain Score of 4-6, CPOT 3-4  Severe Pain = Pain  Score of 7-10, CPOT 5-8     1450               ondansetron (ZOFRAN) injection 4 mg  Dose: 4 mg  Freq: Once Route: IV  Start: 12/02/20 1409   End: 12/02/20 1450    1450               sodium chloride 0.9 % bolus 1,000 mL  Dose: 1,000 mL  Freq: Once Route: IV  Start: 12/02/20 1504   End: 12/02/20 1607    1507              Discontinued Medications  Medications 12/02/20 12/03/20 12/04/20       cholecalciferol (VITAMIN D3) tablet 1,000 Units  Dose: 1,000 Units  Freq: Daily Route: PO  Start: 12/03/20 1500   End: 12/03/20 1312          cholecalciferol (VITAMIN D3) tablet 1,000 Units  Dose: 1,000 Units  Freq: Daily Route: PO  Start: 12/02/20 1915   End: 12/02/20 1914          cholecalciferol (VITAMIN D3) tablet 2,000 Units  Dose: 2,000 Units  Freq: Daily Route: PO  Start: 12/02/20 2000   End: 12/03/20 1311 2027          0807              levothyroxine (SYNTHROID, LEVOTHROID) tablet 25 mcg  Dose: 25 mcg  Freq: Daily Route: PO  Start: 12/02/20 1915   End: 12/02/20 1833    Admin Instructions:   Take on empty stomach.           sodium chloride 0.9 % infusion  Rate: 125 mL/hr Dose: 125 mL/hr  Freq: Continuous Route: IV  Start: 12/02/20 1154   End: 12/03/20 0837    1218     1507     2130        0805     1100                         Lab Results (all)     Procedure Component Value Units Date/Time    Ferritin [[id number]]  (Abnormal) Collected: 12/04/20 0521    Specimen: Blood Updated: 12/04/20 0710     Ferritin 427.00 ng/mL     Narrative:      Results may be falsely decreased if patient taking Biotin.      Comprehensive Metabolic Panel [[id number]]  (Abnormal) Collected: 12/04/20 0521    Specimen: Blood Updated: 12/04/20 0706     Glucose 107 mg/dL      BUN 36 mg/dL      Creatinine 1.05 mg/dL      Sodium 140 mmol/L      Potassium 4.2 mmol/L      Chloride 112 mmol/L      CO2 18.3 mmol/L      Calcium 9.1 mg/dL      Total Protein 6.0 g/dL      Albumin 3.30 g/dL      ALT (SGPT) 11 U/L      AST (SGOT) 18 U/L      Alkaline Phosphatase 103  U/L      Total Bilirubin 0.2 mg/dL      eGFR Non African Amer 52 mL/min/1.73      Globulin 2.7 gm/dL      A/G Ratio 1.2 g/dL      BUN/Creatinine Ratio 34.3     Anion Gap 9.7 mmol/L     Narrative:      CK [[id number]]  (Normal) Collected: 12/04/20 0521    Specimen: Blood Updated: 12/04/20 0705     Creatine Kinase 48 U/L     C-reactive Protein [[id number]]  (Abnormal) Collected: 12/04/20 0521    Specimen: Blood Updated: 12/04/20 0705     C-Reactive Protein 9.95 mg/dL     CBC & Differential [[id number]]  (Abnormal) Collected: 12/04/20 0521    Specimen: Blood Updated: 12/04/20 0647    Narrative:      CBC Auto Differential [[id number]]  (Abnormal) Collected: 12/04/20 0521    Specimen: Blood Updated: 12/04/20 0647     WBC 5.98 10*3/mm3      RBC 6.08 10*6/mm3      Hemoglobin 18.4 g/dL      Hematocrit 53.9 %      MCV 88.7 fL      MCH 30.3 pg      MCHC 34.1 g/dL      RDW 15.1 %      RDW-SD 46.8 fl      MPV 10.0 fL      Platelets 172 10*3/mm3      Neutrophil % 83.8 %      Lymphocyte % 9.0 %      Monocyte % 5.9 %      Eosinophil % 0.0 %      Basophil % 0.3 %      Immature Grans % 1.0 %      Neutrophils, Absolute 5.01 10*3/mm3      Lymphocytes, Absolute 0.54 10*3/mm3      Monocytes, Absolute 0.35 10*3/mm3      Eosinophils, Absolute 0.00 10*3/mm3      Basophils, Absolute 0.02 10*3/mm3      Immature Grans, Absolute 0.06 10*3/mm3      nRBC 0.2 /100 WBC     Blood Culture - Blood, Arm, Left [[id number]] Collected: 12/03/20 1059    Specimen: Blood from Arm, Left Updated: 12/03/20 1137    Blood Culture - Blood, Hand, Right [[id number]] Collected: 12/03/20 1100    Specimen: Blood from Hand, Right Updated: 12/03/20 1137    C-reactive Protein [[id number]]  (Abnormal) Collected: 12/03/20 0221    Specimen: Blood Updated: 12/03/20 0511     C-Reactive Protein 20.15 mg/dL     Troponin [[id number]]  (Normal) Collected: 12/03/20 0221    Specimen: Blood Updated: 12/03/20 0430     Troponin T <0.010 ng/mL     Narrative:      Ferritin [[id number]]   (Abnormal) Collected: 12/03/20 0221    Specimen: Blood Updated: 12/03/20 0430     Ferritin 382.00 ng/mL     Narrative:      Results may be falsely decreased if patient taking Biotin.      Fibrinogen [[id number]]  (Abnormal) Collected: 12/03/20 0221    Specimen: Blood Updated: 12/03/20 0426     Fibrinogen 510 mg/dL     Comprehensive Metabolic Panel [[id number]]  (Abnormal) Collected: 12/03/20 0221    Specimen: Blood Updated: 12/03/20 0426     Glucose 117 mg/dL      BUN 30 mg/dL      Creatinine 1.13 mg/dL      Sodium 137 mmol/L      Potassium 4.4 mmol/L      Chloride 108 mmol/L      CO2 17.3 mmol/L      Calcium 8.6 mg/dL      Total Protein 6.0 g/dL      Albumin 3.20 g/dL      ALT (SGPT) 11 U/L      AST (SGOT) 17 U/L      Alkaline Phosphatase 104 U/L      Total Bilirubin 0.2 mg/dL      eGFR Non African Amer 48 mL/min/1.73      Globulin 2.8 gm/dL      A/G Ratio 1.1 g/dL      BUN/Creatinine Ratio 26.5     Anion Gap 11.7 mmol/L     Narrative:      GFR Normal >60  Chronic Kidney Disease <60  Kidney Failure <15      D-dimer, Quantitative [[id number]]  (Abnormal) Collected: 12/03/20 0221    Specimen: Blood Updated: 12/03/20 0426     D-Dimer, Quantitative 0.75 MCGFEU/mL     Narrative:      Magnesium [[id number]]  (Normal) Collected: 12/03/20 0221    Specimen: Blood Updated: 12/03/20 0422     Magnesium 2.2 mg/dL     CK [[id number]]  (Normal) Collected: 12/03/20 0221    Specimen: Blood Updated: 12/03/20 0422     Creatine Kinase 65 U/L     Lactate Dehydrogenase [[id number]]  (Abnormal) Collected: 12/03/20 0221    Specimen: Blood Updated: 12/03/20 0422      U/L     CBC & Differential [[id number]]  (Abnormal) Collected: 12/03/20 0221    Specimen: Blood Updated: 12/03/20 0352    Narrative:      CBC Auto Differential [[id number]]  (Abnormal) Collected: 12/03/20 0221    Specimen: Blood Updated: 12/03/20 0352     WBC 5.49 10*3/mm3      RBC 4.33 10*6/mm3      Hemoglobin 12.9 g/dL      Hematocrit 38.9 %      MCV 89.8 fL      MCH 29.8 pg       MCHC 33.2 g/dL      RDW 14.2 %      RDW-SD 46.0 fl      MPV 10.4 fL      Platelets 266 10*3/mm3      Neutrophil % 79.4 %      Lymphocyte % 14.2 %      Monocyte % 5.5 %      Eosinophil % 0.0 %      Basophil % 0.2 %      Immature Grans % 0.7 %      Neutrophils, Absolute 4.36 10*3/mm3      Lymphocytes, Absolute 0.78 10*3/mm3      Monocytes, Absolute 0.30 10*3/mm3      Eosinophils, Absolute 0.00 10*3/mm3      Basophils, Absolute 0.01 10*3/mm3      Immature Grans, Absolute 0.04 10*3/mm3      nRBC 0.0 /100 WBC     Troponin [[id number]]  (Normal) Collected: 12/02/20 2033    Specimen: Blood from Hand, Left Updated: 12/02/20 2106     Troponin T <0.010 ng/mL     Narrative:      Procalcitonin [[id number]]  (Abnormal) Collected: 12/02/20 1203    Specimen: Blood Updated: 12/02/20 1320     Procalcitonin 0.26 ng/mL     Narrative:      Ferritin [[id number]]  (Abnormal) Collected: 12/02/20 1203    Specimen: Blood Updated: 12/02/20 1317     Ferritin 436.00 ng/mL     Narrative:      Results may be falsely decreased if patient taking Biotin.      Comprehensive Metabolic Panel [[id number]]  (Abnormal) Collected: 12/02/20 1203    Specimen: Blood Updated: 12/02/20 1314     Glucose 109 mg/dL      BUN 33 mg/dL      Creatinine 1.61 mg/dL      Sodium 134 mmol/L      Potassium 4.0 mmol/L      Chloride 99 mmol/L      CO2 18.3 mmol/L      Calcium 9.9 mg/dL      Total Protein 8.1 g/dL      Albumin 4.20 g/dL      ALT (SGPT) 12 U/L      AST (SGOT) 24 U/L      Alkaline Phosphatase 148 U/L      Total Bilirubin 0.5 mg/dL      eGFR Non African Amer 32 mL/min/1.73      Globulin 3.9 gm/dL      A/G Ratio 1.1 g/dL      BUN/Creatinine Ratio 20.5     Anion Gap 16.7 mmol/L     Narrative:      Lactate Dehydrogenase [[id number]]  (Abnormal) Collected: 12/02/20 1203    Specimen: Blood Updated: 12/02/20 1314      U/L     C-reactive Protein [[id number]]  (Abnormal) Collected: 12/02/20 1203    Specimen: Blood Updated: 12/02/20 1314     C-Reactive Protein 28.19  mg/dL     Lactic Acid, Plasma [[id number]]  (Normal) Collected: 12/02/20 1202    Specimen: Blood Updated: 12/02/20 1310     Lactate 1.9 mmol/L     CBC & Differential [[id number]]  (Abnormal) Collected: 12/02/20 1202    Specimen: Blood Updated: 12/02/20 1243    Narrative:      CBC Auto Differential [[id number]]  (Abnormal) Collected: 12/02/20 1202    Specimen: Blood Updated: 12/02/20 1243     WBC 12.09 10*3/mm3      RBC 5.45 10*6/mm3      Hemoglobin 16.1 g/dL      Hematocrit 48.5 %      MCV 89.0 fL      MCH 29.5 pg      MCHC 33.2 g/dL      RDW 14.4 %      RDW-SD 46.1 fl      MPV 10.3 fL      Platelets 325 10*3/mm3      Neutrophil % 81.3 %      Lymphocyte % 8.9 %      Monocyte % 9.0 %      Eosinophil % 0.0 %      Basophil % 0.2 %      Immature Grans % 0.6 %      Neutrophils, Absolute 9.82 10*3/mm3      Lymphocytes, Absolute 1.08 10*3/mm3      Monocytes, Absolute 1.09 10*3/mm3      Eosinophils, Absolute 0.00 10*3/mm3      Basophils, Absolute 0.03 10*3/mm3      Immature Grans, Absolute 0.07 10*3/mm3      nRBC 0.0 /100 WBC     Blood Gas, Arterial - [[id number]]  (Abnormal) Collected: 12/02/20 1236    Specimen: Arterial Blood Updated: 12/02/20 1242     Site Arterial: right radial     Kb's Test Positive     pH, Arterial 7.383 pH units      pCO2, Arterial 27.1 mm Hg      pO2, Arterial 79.5 mm Hg      HCO3, Arterial 16.2 mmol/L      Base Excess, Arterial -7.1 mmol/L      O2 Saturation Calculated 95.7 %      Barometric Pressure for Blood Gas 760.6 mmHg      Modality Cannula     Flow Rate 1 lpm      Set [facility] Resp Rate 26          Imaging Results (Most Recent)     Procedure Component Value Units Date/Time    XR Chest AP [[id number]] Collected: 12/02/20 1301     Updated: 12/02/20 1304    Narrative:      CHEST SINGLE VIEW     HISTORY: Shortness of air, cough, COVID-19 positive.     COMPARISON: 2-view chest 12/22/2017.     FINDINGS: There is hazy peripheral predominant infiltrates within the  right mid to lower lung consistent with  COVID-19 infiltrate. The heart  and mediastinal structures appear within normal limits. No perihilar  edema is evident. Aortic vascular calcifications are present.       Impression:      Hazy peripheral predominant infiltrates within the right mid  to lower lung consistent with COVID-19 infiltrates.     This report was finalized on 12/2/2020 1:01 PM by Dr. Flako Clements M.D.           ECG/EMG Results (all)     Procedure Component Value Units Date/Time    ECG 12 Lead [[id number]] Collected: 12/02/20 1213     Updated: 12/02/20 2021     QT Interval 327 ms     Narrative:      HEART RATE= 107  bpm  RR Interval= 564  ms  TX Interval= 136  ms  P Horizontal Axis= 13  deg  P Front Axis= 50  deg  QRSD Interval= 86  ms  QT Interval= 327  ms  QRS Axis= -1  deg  T Wave Axis= -13  deg  - BORDERLINE ECG -  Sinus tachycardia  Borderline T abnormalities, diffuse leads  af resolved  Electronically S[facility]: [facility] 02-Dec-2020 20:15:49  Date and Time of Study: 2020-12-02 12:13:16          Orders (all)      Start     Ordered    12/03/20 1300  remdesivir 100 mg in sodium chloride 0.9 % 250 mL IVPB (powder vial)  Every 24 Hours      12/02/20 1507    12/03/20 0900  amLODIPine (NORVASC) tablet 5 mg  Daily      12/02/20 1827    12/03/20 0900  levothyroxine (SYNTHROID, LEVOTHROID) tablet 25 mcg  Once per day on Mon Tue Wed Thu Fri Sat      12/02/20 1833    12/03/20 0842  Blood Culture - Blood, Arm, Left  Once      12/03/20 0841    12/03/20 0842  Blood Culture - Blood, Hand, Right  Once     Comments: From a different site than #1.      12/03/20 0841    12/03/20 0800  dexamethasone (DECADRON) tablet 6 mg  Daily With Breakfast      12/02/20 1504    12/03/20 0800  Dietary Nutrition Supplements Boost Plus (Ensure Enlive, Ensure Plus)  2 Times Daily      12/02/20 1914    12/02/20 2000  Turn Cough Deep Breathe  Every Hour      12/02/20 1914 12/02/20 1915  Up In Chair  Daily      12/02/20 1914 12/02/20 1914  albuterol  sulfate HFA (PROVENTIL HFA;VENTOLIN HFA;PROAIR HFA) inhaler 2 puff  Every 2 Hours PRN      12/02/20 1914 12/02/20 1914  Commode At Bedside  Until Discontinued      12/02/20 1914 12/02/20 1914  Diet Instructions To Nursing  Until Discontinued     Comments: Please ensure patient eating enough. Document % or meals eaten.  Please ensure they receive at least BID can do TID of ensure or  Can change and try magic cups or seth    12/02/20 1914 12/02/20 1828  Daily Weights  Daily      12/02/20 1827 12/02/20 1826  Code Status and Medical Interventions:  Continuous      12/02/20 1827    12/02/20 1826  Vital Signs  Per Hospital Policy     Comments: Per per hospital policy    12/02/20 1827    12/02/20 1826  Pulse Oximetry,  Spot  Per Hospital Policy,   Status:  Canceled      12/02/20 1827    12/02/20 1826  Activity - Ad Tegan  Until Discontinued     Comments: Unless PT eval ordered- then OOB w/ assistance til cleared by PT    12/02/20 1827    12/02/20 1826  Strict Intake & Output  Every Shift      12/02/20 1827    12/02/20 1826  Notify Physician (with Parameters)  Until Discontinued      12/02/20 1827 12/02/20 1826  Incentive Spirometry  Every Hour While Awake      12/02/20 1827    12/02/20 1826  Diet Regular; Cardiac  Diet Effective Now      12/02/20 1827    12/02/20 1826  Inpatient Consult to Case Management   Once     Provider:  (Not yet assigned)    12/02/20 1827    12/02/20 1826  Telemetry - Maintain IV Access  Continuous      12/02/20 1827    12/02/20 1826  Continuous Cardiac Monitoring  Continuous,   Status:  Canceled      12/02/20 1827    12/02/20 1826  May Be Off Telemetry for Tests  Continuous      12/02/20 1827    12/02/20 1826  ACLS Protocol For Life Threatening Dysrhythmias (Unless Code Status Indicates Otherwise)  Continuous      12/02/20 1827    12/02/20 1826  Notify Provider if ACLS Protocol Activated  Until Discontinued      12/02/20 1827    12/02/20 1826  VTE Prophylaxis Not  Indicated: Other: Patient Currently Anticoagulated / Receiving Prophylaxis  Once      20 1827    20 1826  Pulse Oximetry, Continuous  Continuous      20 1827    20 1826  Cardiac Monitoring  Continuous      20 1827    20 1825  Telemetry - Pulse Oximetry  Continuous PRN,   Status:  Canceled     Comments: If Patient Develops Unresponsiveness, Acute Dyspnea, Cyanosis or Suspected Hypoxemia Start Continuous Pulse Ox Monitoring, Apply Oxygen & Notify Provider    20 1827    20 1825  Oxygen Therapy- Nasal Cannula; Titrate for SPO2: 90% - 95%  Continuous PRN,   Status:  Canceled     Comments: If Patient Develops Unresponsiveness, Acute Dyspnea, Cyanosis or Suspected Hypoxemia Start Continuous Pulse Ox Monitoring, Apply Oxygen & Notify Provider    20 1827    20 1346  Inpatient Admission  Once      20 1345    20 1312  dexamethasone sodium phosphate injection 6 mg  Once      20 1310    20 1311  [facility] (on-call MD unless specified) Details  Once     Specialty:  Hospitalist  Provider:  (Not yet assigned)    20 1310    Unscheduled  Check Pulse Oximetry On Room Air While Ambulating  As Needed     Comments: Or when goes to bathroom or transfers stretches. Document lowest    20 1503    Unscheduled  Oxygen Therapy- Nasal Cannula; Titrate for SPO2: 92%  Continuous PRN      20 1827    Unscheduled  Nasotracheal Suctioning (RT)  Every 6 Hours PRN - RT      20 1914                     Physician Progress Notes (all)      Seb Worley DO at 20 1304                [facility] HOSPITALIST    PROGRESS NOTE    Name:  Leonard Best   Age:  70 y.o.  Sex:  female  :  1950  MRN:  [id number]   Visit Number:  [id number]  Admission Date:  2020  Date Of Service:  20  Primary Care Physician:  Felicia Gloria PA-C     LOS: 1 day :  Patient Care Team:  Felicia Gloria PA-C as PCP - General  Felicia Gloria,  JOSIAH as PCP - Family Medicine  SaldivarKvng quintero MD as Consulting Physician (Nephrology)  Murtaza Woods MD as Consulting Physician (Cardiology)  Denis Worley MD as Consulting Physician (Urology):    History taken from:     patient chart    Chief Complaint:      Dyspnea    Subjective     Interval History:     Patient seen and examined today.  Reviewed history and physical, lab work, radiology, chart.    Patient is a 70-year-old female with history of paroxysmal SVT who comes in with shortness of breath, fatigue, nausea, vomiting, diarrhea.  This has been going on since Saturday she states.  In the emergency room she was noted to have a few minutes of tachycardia in the 170s.  She currently is in sinus rhythm with rate controlled.  She is noted to be hypoxia.  Chest x-ray did show right-sided pneumonia.  Patient was admitted and placed on Decadron.  Remdesivir has been started patient is agreeable.  Patient was receiving IV fluids will discontinue these as her creatinine has improved down to 1.13.    Patient states she feels better.  She still has dyspnea, worse on exertion.  She denies any chest pressure, nausea, vomiting, diarrhea at present.  Home medications have been restarted.    Review of Systems:     All systems were reviewed and negative except for:  Respiratory: positive for  cough, dry and shortness of air    Objective     Vital Signs:    Temp:  [97.1 °F (36.2 °C)-98 °F (36.7 °C)] 97.1 °F (36.2 °C)  Heart Rate:  [72-94] 76  Resp:  [16-20] 16  BP: (143-163)/() 152/97    Physical Exam:    General: Alert and oriented x4, mild distress.  Heart: Regular rate and rhythm without murmur rub or thrill  Lungs: Rhonchi noted on the right.  No use of accessory muscles respiration.  Abdomen: Soft/nontender/nondistended.  No paraspinal megaly is noted.  MSK: 4/5 strength in upper/lower extremities bilaterally.     Results Review:      I reviewed the patient's new clinical results.    Medication  Review:     amLODIPine, 5 mg, Oral, Daily  budesonide-formoterol, 2 puff, Inhalation, BID - RT  cholecalciferol, 2,000 Units, Oral, Daily  dexamethasone, 6 mg, Oral, Daily With Breakfast  enoxaparin, 40 mg, Subcutaneous, Q12H  folic acid, 1,000 mcg, Oral, Daily  levothyroxine, 25 mcg, Oral, Once per day on Mon Tue Wed Thu Fri Sat  metoprolol succinate XL, 25 mg, Oral, Q24H  remdesivir, 100 mg, Intravenous, Q24H  rosuvastatin, 20 mg, Oral, Daily  vitamin B-12, 1,000 mcg, Oral, Daily  vitamin C, 2,000 mg, Oral, Daily        Pharmacy Consult - Remdesivir,         Assessment/Plan     Problem List Items Addressed This Visit        Other    Pneumonia due to COVID-19 virus - Primary      Other Visit Diagnoses     Acute hypoxemic respiratory failure (CMS/HCC)              1.  Covid-19 pneumonia, present on admission  2.  Acute respiratory failure with hypoxia  3.  Paroxysmal SVT, restart beta-blocker  4.  High anion gap metabolic acidosis, due to dehydration  5.  Chronic kidney disease stage IIIb  6.  Hypothyroidism  7.  Chronic diastolic congestive heart failure  8.  Essential hypertension, uncontrolled    Plan:    We will continue with Decadron and remdesivir.  Discontinue IV fluids at present.  Restart home medications.  Monitor inflammatory markers.  Monitor oxygen status closely.  Lovenox for DVT prophylaxis.  Further recommendations will depend on the clinical course.    Seb Worley DO  12/03/20  13:04 EST    Electronically signed by Seb Worley DO at 12/03/20 1310          Consult Notes (all)      Hina Hirsch at 12/03/20 1005        Initial hospitality call with spouse Marlon. He also is at home sick.     Electronically signed by Hina Hirsch at 12/03/20 3992

## 2020-12-05 LAB
ALBUMIN SERPL-MCNC: 3.3 G/DL (ref 3.5–5.2)
ALBUMIN/GLOB SERPL: 1.3 G/DL
ALP SERPL-CCNC: 104 U/L (ref 39–117)
ALT SERPL W P-5'-P-CCNC: 15 U/L (ref 1–33)
ANION GAP SERPL CALCULATED.3IONS-SCNC: 8.4 MMOL/L (ref 5–15)
AST SERPL-CCNC: 19 U/L (ref 1–32)
BASOPHILS # BLD AUTO: 0.02 10*3/MM3 (ref 0–0.2)
BASOPHILS NFR BLD AUTO: 0.2 % (ref 0–1.5)
BILIRUB SERPL-MCNC: 0.2 MG/DL (ref 0–1.2)
BUN SERPL-MCNC: 34 MG/DL (ref 8–23)
BUN/CREAT SERPL: 29.6 (ref 7–25)
CALCIUM SPEC-SCNC: 8.9 MG/DL (ref 8.6–10.5)
CHLORIDE SERPL-SCNC: 110 MMOL/L (ref 98–107)
CK SERPL-CCNC: 45 U/L (ref 20–180)
CO2 SERPL-SCNC: 18.6 MMOL/L (ref 22–29)
CREAT SERPL-MCNC: 1.15 MG/DL (ref 0.57–1)
CRP SERPL-MCNC: 4.81 MG/DL (ref 0–0.5)
D DIMER PPP FEU-MCNC: 0.69 MCGFEU/ML (ref 0–0.49)
DEPRECATED RDW RBC AUTO: 46 FL (ref 37–54)
EOSINOPHIL # BLD AUTO: 0 10*3/MM3 (ref 0–0.4)
EOSINOPHIL NFR BLD AUTO: 0 % (ref 0.3–6.2)
ERYTHROCYTE [DISTWIDTH] IN BLOOD BY AUTOMATED COUNT: 14.3 % (ref 12.3–15.4)
FERRITIN SERPL-MCNC: 415 NG/ML (ref 13–150)
FIBRINOGEN PPP-MCNC: 381 MG/DL (ref 219–464)
GFR SERPL CREATININE-BSD FRML MDRD: 47 ML/MIN/1.73
GLOBULIN UR ELPH-MCNC: 2.6 GM/DL
GLUCOSE SERPL-MCNC: 95 MG/DL (ref 65–99)
HCT VFR BLD AUTO: 38.7 % (ref 34–46.6)
HGB BLD-MCNC: 13 G/DL (ref 12–15.9)
IMM GRANULOCYTES # BLD AUTO: 0.15 10*3/MM3 (ref 0–0.05)
IMM GRANULOCYTES NFR BLD AUTO: 1.5 % (ref 0–0.5)
LDH SERPL-CCNC: 299 U/L (ref 135–214)
LYMPHOCYTES # BLD AUTO: 0.93 10*3/MM3 (ref 0.7–3.1)
LYMPHOCYTES NFR BLD AUTO: 9.1 % (ref 19.6–45.3)
MCH RBC QN AUTO: 29.6 PG (ref 26.6–33)
MCHC RBC AUTO-ENTMCNC: 33.6 G/DL (ref 31.5–35.7)
MCV RBC AUTO: 88.2 FL (ref 79–97)
MONOCYTES # BLD AUTO: 0.81 10*3/MM3 (ref 0.1–0.9)
MONOCYTES NFR BLD AUTO: 7.9 % (ref 5–12)
NEUTROPHILS NFR BLD AUTO: 8.3 10*3/MM3 (ref 1.7–7)
NEUTROPHILS NFR BLD AUTO: 81.3 % (ref 42.7–76)
NRBC BLD AUTO-RTO: 0 /100 WBC (ref 0–0.2)
PLATELET # BLD AUTO: 393 10*3/MM3 (ref 140–450)
PMV BLD AUTO: 9.7 FL (ref 6–12)
POTASSIUM SERPL-SCNC: 4 MMOL/L (ref 3.5–5.2)
PROT SERPL-MCNC: 5.9 G/DL (ref 6–8.5)
RBC # BLD AUTO: 4.39 10*6/MM3 (ref 3.77–5.28)
SODIUM SERPL-SCNC: 137 MMOL/L (ref 136–145)
WBC # BLD AUTO: 10.21 10*3/MM3 (ref 3.4–10.8)

## 2020-12-05 PROCEDURE — 82550 ASSAY OF CK (CPK): CPT | Performed by: INTERNAL MEDICINE

## 2020-12-05 PROCEDURE — 85384 FIBRINOGEN ACTIVITY: CPT | Performed by: INTERNAL MEDICINE

## 2020-12-05 PROCEDURE — 80053 COMPREHEN METABOLIC PANEL: CPT | Performed by: INTERNAL MEDICINE

## 2020-12-05 PROCEDURE — 86140 C-REACTIVE PROTEIN: CPT | Performed by: INTERNAL MEDICINE

## 2020-12-05 PROCEDURE — 85379 FIBRIN DEGRADATION QUANT: CPT | Performed by: INTERNAL MEDICINE

## 2020-12-05 PROCEDURE — 63710000001 DEXAMETHASONE PER 0.25 MG: Performed by: INTERNAL MEDICINE

## 2020-12-05 PROCEDURE — 82728 ASSAY OF FERRITIN: CPT | Performed by: INTERNAL MEDICINE

## 2020-12-05 PROCEDURE — 25010000002 ENOXAPARIN PER 10 MG: Performed by: INTERNAL MEDICINE

## 2020-12-05 PROCEDURE — 83615 LACTATE (LD) (LDH) ENZYME: CPT | Performed by: INTERNAL MEDICINE

## 2020-12-05 PROCEDURE — 94799 UNLISTED PULMONARY SVC/PX: CPT

## 2020-12-05 PROCEDURE — 85025 COMPLETE CBC W/AUTO DIFF WBC: CPT | Performed by: INTERNAL MEDICINE

## 2020-12-05 RX ADMIN — ROSUVASTATIN CALCIUM 20 MG: 20 TABLET, FILM COATED ORAL at 21:51

## 2020-12-05 RX ADMIN — AMLODIPINE BESYLATE 5 MG: 5 TABLET ORAL at 08:37

## 2020-12-05 RX ADMIN — OXYCODONE HYDROCHLORIDE AND ACETAMINOPHEN 2000 MG: 500 TABLET ORAL at 08:37

## 2020-12-05 RX ADMIN — ENOXAPARIN SODIUM 40 MG: 40 INJECTION SUBCUTANEOUS at 20:03

## 2020-12-05 RX ADMIN — BUDESONIDE AND FORMOTEROL FUMARATE DIHYDRATE 2 PUFF: 160; 4.5 AEROSOL RESPIRATORY (INHALATION) at 08:40

## 2020-12-05 RX ADMIN — HYDROCODONE BITARTRATE AND ACETAMINOPHEN 1 TABLET: 5; 325 TABLET ORAL at 14:13

## 2020-12-05 RX ADMIN — ENOXAPARIN SODIUM 40 MG: 40 INJECTION SUBCUTANEOUS at 07:08

## 2020-12-05 RX ADMIN — Medication 1000 MCG: at 08:37

## 2020-12-05 RX ADMIN — METOPROLOL SUCCINATE 25 MG: 25 TABLET, EXTENDED RELEASE ORAL at 08:37

## 2020-12-05 RX ADMIN — Medication 1000 UNITS: at 08:37

## 2020-12-05 RX ADMIN — REMDESIVIR 100 MG: 100 INJECTION, POWDER, LYOPHILIZED, FOR SOLUTION INTRAVENOUS at 12:52

## 2020-12-05 RX ADMIN — LEVOTHYROXINE SODIUM 25 MCG: 25 TABLET ORAL at 08:37

## 2020-12-05 RX ADMIN — FOLIC ACID 1000 MCG: 1 TABLET ORAL at 08:37

## 2020-12-05 RX ADMIN — BUDESONIDE AND FORMOTEROL FUMARATE DIHYDRATE 2 PUFF: 160; 4.5 AEROSOL RESPIRATORY (INHALATION) at 20:01

## 2020-12-05 RX ADMIN — DEXAMETHASONE 6 MG: 4 TABLET ORAL at 08:37

## 2020-12-05 NOTE — PROGRESS NOTES
[facility] HOSPITALIST    PROGRESS NOTE    Name:  Leonard Best   Age:  70 y.o.  Sex:  female  :  1950  MRN:  [id number]   Visit Number:  [id number]  Admission Date:  2020  Date Of Service:  20  Primary Care Physician:  Felicia Gloria PA-C     LOS: 3 days :  Patient Care Team:  Felicia Gloria PA-C as PCP - General  Felicia Gloria PA-C as PCP - Family Medicine  Kvng Saldivar MD as Consulting Physician (Nephrology)  Murtaza Woods MD as Consulting Physician (Cardiology)  Denis Worley MD as Consulting Physician (Urology):    History taken from:     patient chart    Chief Complaint:      Dyspnea    Subjective     Interval History:     Patient seen and examined .    Patient is a 70-year-old female with history of paroxysmal SVT who comes in with shortness of breath, fatigue, nausea, vomiting, diarrhea.  This has been going on since Saturday prior to admission she states.  She is noted to have hypoxia.  Chest x-ray did show right-sided pneumonia.  Patient was admitted and placed on Decadron as well as remdesivir.  Patient is increased her fluid intake, her creatinine is improved.    Patient is on day  of remdesivir.  She is requiring 5 L of oxygen.  Her inflammatory markers are improved.  She states that if she is shaky from being on Decadron.  She denies any chest pressure, nausea, vomiting, pain.  She is eating better.    Review of Systems:     All systems were reviewed and negative except for:  Respiratory: positive for  cough, dry and shortness of air    Objective     Vital Signs:    Temp:  [96.9 °F (36.1 °C)-97.4 °F (36.3 °C)] 97.1 °F (36.2 °C)  Heart Rate:  [54-77] 61  Resp:  [16-18] 16  BP: (128-158)/(90-99) 128/94    Physical Exam:    General: Alert and oriented x4, mild distress.  Heart: Regular rate and rhythm without murmur rub or thrill  Lungs: Rhonchi noted on the right.  No use of accessory muscles respiration.  Abdomen:  Soft/nontender/nondistended.  No paraspinal megaly is noted.  MSK: 4/5 strength in upper/lower extremities bilaterally.     Results Review:      I reviewed the patient's new clinical results.    Labs:    Lab Results (last 24 hours)     Procedure Component Value Units Date/Time    Blood Culture - Blood, Arm, Left [[id number]] Collected: 12/03/20 1059    Specimen: Blood from Arm, Left Updated: 12/05/20 1145     Blood Culture No growth at 2 days    Blood Culture - Blood, Hand, Right [[id number]] Collected: 12/03/20 1100    Specimen: Blood from Hand, Right Updated: 12/05/20 1145     Blood Culture No growth at 2 days    Ferritin [[id number]]  (Abnormal) Collected: 12/05/20 0602    Specimen: Blood Updated: 12/05/20 0726     Ferritin 415.00 ng/mL     Narrative:      Results may be falsely decreased if patient taking Biotin.      Comprehensive Metabolic Panel [[id number]]  (Abnormal) Collected: 12/05/20 0602    Specimen: Blood Updated: 12/05/20 0723     Glucose 95 mg/dL      BUN 34 mg/dL      Creatinine 1.15 mg/dL      Sodium 137 mmol/L      Potassium 4.0 mmol/L      Chloride 110 mmol/L      CO2 18.6 mmol/L      Calcium 8.9 mg/dL      Total Protein 5.9 g/dL      Albumin 3.30 g/dL      ALT (SGPT) 15 U/L      AST (SGOT) 19 U/L      Alkaline Phosphatase 104 U/L      Total Bilirubin 0.2 mg/dL      eGFR Non African Amer 47 mL/min/1.73      Globulin 2.6 gm/dL      A/G Ratio 1.3 g/dL      BUN/Creatinine Ratio 29.6     Anion Gap 8.4 mmol/L     Narrative:      GFR Normal >60  Chronic Kidney Disease <60  Kidney Failure <15      CK [[id number]]  (Normal) Collected: 12/05/20 0602    Specimen: Blood Updated: 12/05/20 0723     Creatine Kinase 45 U/L     C-reactive Protein [[id number]]  (Abnormal) Collected: 12/05/20 0602    Specimen: Blood Updated: 12/05/20 0723     C-Reactive Protein 4.81 mg/dL     Lactate Dehydrogenase [[id number]]  (Abnormal) Collected: 12/05/20 0602    Specimen: Blood Updated: 12/05/20 0723      U/L     D-dimer,  Quantitative [[id number]]  (Abnormal) Collected: 12/05/20 0602    Specimen: Blood Updated: 12/05/20 0713     D-Dimer, Quantitative 0.69 MCGFEU/mL     Narrative:      The Stago D-Dimer test used in conjunction with a clinical pretest probability (PTP) assessment model, has been approved by the FDA to rule out the presence of venous thromboembolism (VTE) in outpatients suspected of deep venous thrombosis (DVT) or pulmonary embolism (PE). The cut-off for negative predictive value is <0.50 MCGFEU/mL.    Fibrinogen [[id number]]  (Normal) Collected: 12/05/20 0602    Specimen: Blood Updated: 12/05/20 0713     Fibrinogen 381 mg/dL     CBC & Differential [[id number]]  (Abnormal) Collected: 12/05/20 0602    Specimen: Blood Updated: 12/05/20 0700    Narrative:      The following orders were created for panel order CBC & Differential.  Procedure                               Abnormality         Status                     ---------                               -----------         ------                     CBC Auto Differential[[id number]]        Abnormal            Final result                 Please view results for these tests on the individual orders.    CBC Auto Differential [[id number]]  (Abnormal) Collected: 12/05/20 0602    Specimen: Blood Updated: 12/05/20 0700     WBC 10.21 10*3/mm3      RBC 4.39 10*6/mm3      Hemoglobin 13.0 g/dL      Hematocrit 38.7 %      MCV 88.2 fL      MCH 29.6 pg      MCHC 33.6 g/dL      RDW 14.3 %      RDW-SD 46.0 fl      MPV 9.7 fL      Platelets 393 10*3/mm3      Neutrophil % 81.3 %      Lymphocyte % 9.1 %      Monocyte % 7.9 %      Eosinophil % 0.0 %      Basophil % 0.2 %      Immature Grans % 1.5 %      Neutrophils, Absolute 8.30 10*3/mm3      Lymphocytes, Absolute 0.93 10*3/mm3      Monocytes, Absolute 0.81 10*3/mm3      Eosinophils, Absolute 0.00 10*3/mm3      Basophils, Absolute 0.02 10*3/mm3      Immature Grans, Absolute 0.15 10*3/mm3      nRBC 0.0 /100 WBC            Radiology:    Imaging  Results (Last 24 Hours)     ** No results found for the last 24 hours. **          Medication Review:     amLODIPine, 5 mg, Oral, Daily  budesonide-formoterol, 2 puff, Inhalation, BID - RT  cholecalciferol, 1,000 Units, Oral, Daily  dexamethasone, 6 mg, Oral, Daily With Breakfast  enoxaparin, 40 mg, Subcutaneous, Q12H  folic acid, 1,000 mcg, Oral, Daily  levothyroxine, 25 mcg, Oral, Once per day on Mon Tue Wed Thu Fri Sat  metoprolol succinate XL, 25 mg, Oral, Q24H  remdesivir, 100 mg, Intravenous, Q24H  rosuvastatin, 20 mg, Oral, Daily  vitamin B-12, 1,000 mcg, Oral, Daily  vitamin C, 2,000 mg, Oral, Daily        Pharmacy Consult - Remdesivir,         Assessment/Plan     Problem List Items Addressed This Visit        Other    Pneumonia due to COVID-19 virus - Primary      Other Visit Diagnoses     Acute hypoxemic respiratory failure (CMS/HCC)              1.  Covid-19 pneumonia, present on admission  2.  Acute respiratory failure with hypoxia  3.  Paroxysmal SVT, restarted beta-blocker  4.  High anion gap metabolic acidosis, due to dehydration, this is resolved.  5.  Chronic kidney disease stage IIIb  6.  Hypothyroidism, continue Synthroid.  7.  Chronic diastolic congestive heart failure, stable.  8.  Essential hypertension, better controlled.    Plan:    We will continue with Decadron and remdesivir.  Day 4/5 of remdesivir.  Titrate oxygen as needed.  Restart home medications.  Monitor inflammatory markers.  Monitor oxygen status closely.  If patient is down to 2-3 L tomorrow, she could be discharged.  Lovenox for DVT prophylaxis.  Further recommendations will depend on the clinical course.    Seb Worley,   12/05/20  15:23 EST

## 2020-12-05 NOTE — PLAN OF CARE
VSS, 5L NC, NSR ON MONITOR, NO NEW ISSUES NOTED WILL CONTINUE TO MONITOR      Problem: Adult Inpatient Plan of Care  Goal: Plan of Care Review  Outcome: Ongoing, Progressing  Goal: Patient-Specific Goal (Individualized)  Outcome: Ongoing, Progressing  Goal: Absence of Hospital-Acquired Illness or Injury  Outcome: Ongoing, Progressing  Intervention: Identify and Manage Fall Risk  Recent Flowsheet Documentation  Taken 12/5/2020 0600 by Robert Reynoso RN  Safety Promotion/Fall Prevention:   activity supervised   assistive device/personal items within reach   clutter free environment maintained   fall prevention program maintained   safety round/check completed  Taken 12/5/2020 0252 by Robert Reynoso RN  Safety Promotion/Fall Prevention:   activity supervised   assistive device/personal items within reach   clutter free environment maintained   fall prevention program maintained   safety round/check completed  Taken 12/5/2020 0225 by Robert Reynoso RN  Safety Promotion/Fall Prevention:   assistive device/personal items within reach   activity supervised   clutter free environment maintained   fall prevention program maintained   safety round/check completed  Taken 12/5/2020 0000 by Robert Reynoso RN  Safety Promotion/Fall Prevention:   activity supervised   assistive device/personal items within reach   clutter free environment maintained   fall prevention program maintained   safety round/check completed  Taken 12/4/2020 2200 by Robert Reynoso RN  Safety Promotion/Fall Prevention:   activity supervised   assistive device/personal items within reach   clutter free environment maintained   fall prevention program maintained   safety round/check completed  Taken 12/4/2020 2010 by Robert Reynoso RN  Safety Promotion/Fall Prevention:   activity supervised   assistive device/personal items within reach   clutter free environment maintained   fall prevention program maintained   safety round/check  completed  Intervention: Prevent Skin Injury  Recent Flowsheet Documentation  Taken 12/5/2020 0600 by Robert Reynoso RN  Body Position: position changed independently  Taken 12/5/2020 0225 by Robert Reynoso RN  Body Position: position changed independently  Taken 12/5/2020 0000 by Robert Reynoso RN  Body Position: position changed independently  Taken 12/4/2020 2200 by Robert Reynoso RN  Body Position: position changed independently  Taken 12/4/2020 2010 by Robert Reynoso RN  Body Position: position changed independently  Intervention: Prevent and Manage VTE (venous thromboembolism) Risk  Recent Flowsheet Documentation  Taken 12/5/2020 0252 by Robert Reynoso RN  VTE Prevention/Management:   bilateral   dorsiflexion/plantar flexion performed  Taken 12/4/2020 2010 by Robert Reynoso RN  VTE Prevention/Management:   bilateral   dorsiflexion/plantar flexion performed  Intervention: Prevent Infection  Recent Flowsheet Documentation  Taken 12/5/2020 0600 by Robert Reynoso RN  Infection Prevention:   environmental surveillance performed   hand hygiene promoted   personal protective equipment utilized   rest/sleep promoted   single patient room provided  Taken 12/5/2020 0252 by Robert Reynoso RN  Infection Prevention:   rest/sleep promoted   environmental surveillance performed   equipment surfaces disinfected   personal protective equipment utilized   hand hygiene promoted  Taken 12/5/2020 0225 by Robert Reynoso RN  Infection Prevention:   rest/sleep promoted   environmental surveillance performed   equipment surfaces disinfected   hand hygiene promoted  Taken 12/5/2020 0000 by Robert Reynoso RN  Infection Prevention: rest/sleep promoted  Taken 12/4/2020 2200 by Robert Reynoso RN  Infection Prevention: rest/sleep promoted  Taken 12/4/2020 2010 by Robert Reynoso RN  Infection Prevention:   environmental surveillance performed   hand hygiene promoted   personal protective equipment utilized    rest/sleep promoted   single patient room provided  Goal: Optimal Comfort and Wellbeing  Outcome: Ongoing, Progressing  Intervention: Provide Person-Centered Care  Recent Flowsheet Documentation  Taken 12/4/2020 2013 by Robert Reynoso RN  Trust Relationship/Rapport: care explained  Taken 12/4/2020 2010 by Robert Reynoso RN  Trust Relationship/Rapport: care explained  Goal: Readiness for Transition of Care  Outcome: Ongoing, Progressing     Problem: Fall Injury Risk  Goal: Absence of Fall and Fall-Related Injury  Outcome: Ongoing, Progressing  Intervention: Identify and Manage Contributors to Fall Injury Risk  Recent Flowsheet Documentation  Taken 12/4/2020 2010 by Robert Reynoso RN  Medication Review/Management: medications reviewed  Intervention: Promote Injury-Free Environment  Recent Flowsheet Documentation  Taken 12/5/2020 0600 by Robert Reynoso RN  Safety Promotion/Fall Prevention:   activity supervised   assistive device/personal items within reach   clutter free environment maintained   fall prevention program maintained   safety round/check completed  Taken 12/5/2020 0252 by Robert Reynoso RN  Safety Promotion/Fall Prevention:   activity supervised   assistive device/personal items within reach   clutter free environment maintained   fall prevention program maintained   safety round/check completed  Taken 12/5/2020 0225 by Robert Reynoso RN  Safety Promotion/Fall Prevention:   assistive device/personal items within reach   activity supervised   clutter free environment maintained   fall prevention program maintained   safety round/check completed  Taken 12/5/2020 0000 by Robert Reynoso RN  Safety Promotion/Fall Prevention:   activity supervised   assistive device/personal items within reach   clutter free environment maintained   fall prevention program maintained   safety round/check completed  Taken 12/4/2020 2200 by Robert Reynoso RN  Safety Promotion/Fall Prevention:   activity  supervised   assistive device/personal items within reach   clutter free environment maintained   fall prevention program maintained   safety round/check completed  Taken 12/4/2020 2010 by Robert Reynoso RN  Safety Promotion/Fall Prevention:   activity supervised   assistive device/personal items within reach   clutter free environment maintained   fall prevention program maintained   safety round/check completed     Problem: Skin Injury Risk Increased  Goal: Skin Health and Integrity  Outcome: Ongoing, Progressing  Intervention: Optimize Skin Protection  Recent Flowsheet Documentation  Taken 12/5/2020 0600 by Robert Reynoso RN  Head of Bed (HOB): HOB flat  Taken 12/5/2020 0252 by Robert Reynoso RN  Pressure Reduction Techniques: frequent weight shift encouraged  Pressure Reduction Devices:   pressure-redistributing mattress utilized   positioning supports utilized  Skin Protection:   adhesive use limited   incontinence pads utilized   tubing/devices free from skin contact  Taken 12/5/2020 0225 by Robert Reynoso RN  Head of Bed (HOB): HOB flat  Taken 12/5/2020 0000 by Robert Reynoso RN  Head of Bed (HOB): HOB flat  Taken 12/4/2020 2200 by Robert Reynoso RN  Head of Bed (HOB): HOB flat  Taken 12/4/2020 2010 by Robert Reynoso RN  Pressure Reduction Techniques: frequent weight shift encouraged  Head of Bed (HOB): HOB flat  Pressure Reduction Devices:   pressure-redistributing mattress utilized   positioning supports utilized  Skin Protection:   adhesive use limited   incontinence pads utilized   tubing/devices free from skin contact   Goal Outcome Evaluation:  Plan of Care Reviewed With: patient  Progress: no change

## 2020-12-06 ENCOUNTER — READMISSION MANAGEMENT (OUTPATIENT)
Dept: CALL CENTER | Facility: HOSPITAL | Age: 70
End: 2020-12-06

## 2020-12-06 VITALS
DIASTOLIC BLOOD PRESSURE: 97 MMHG | TEMPERATURE: 97.3 F | RESPIRATION RATE: 18 BRPM | HEART RATE: 65 BPM | OXYGEN SATURATION: 95 % | BODY MASS INDEX: 32.99 KG/M2 | HEIGHT: 66 IN | SYSTOLIC BLOOD PRESSURE: 146 MMHG | WEIGHT: 205.25 LBS

## 2020-12-06 LAB
ALBUMIN SERPL-MCNC: 3 G/DL (ref 3.5–5.2)
ALBUMIN/GLOB SERPL: 1.2 G/DL
ALP SERPL-CCNC: 98 U/L (ref 39–117)
ALT SERPL W P-5'-P-CCNC: 13 U/L (ref 1–33)
ANION GAP SERPL CALCULATED.3IONS-SCNC: 9.1 MMOL/L (ref 5–15)
AST SERPL-CCNC: 15 U/L (ref 1–32)
BASOPHILS # BLD AUTO: 0.02 10*3/MM3 (ref 0–0.2)
BASOPHILS NFR BLD AUTO: 0.3 % (ref 0–1.5)
BILIRUB SERPL-MCNC: 0.2 MG/DL (ref 0–1.2)
BUN SERPL-MCNC: 31 MG/DL (ref 8–23)
BUN/CREAT SERPL: 25 (ref 7–25)
CALCIUM SPEC-SCNC: 8.9 MG/DL (ref 8.6–10.5)
CHLORIDE SERPL-SCNC: 114 MMOL/L (ref 98–107)
CK SERPL-CCNC: 32 U/L (ref 20–180)
CO2 SERPL-SCNC: 19.9 MMOL/L (ref 22–29)
CREAT SERPL-MCNC: 1.24 MG/DL (ref 0.57–1)
CRP SERPL-MCNC: 3.47 MG/DL (ref 0–0.5)
DEPRECATED RDW RBC AUTO: 47.5 FL (ref 37–54)
EOSINOPHIL # BLD AUTO: 0.01 10*3/MM3 (ref 0–0.4)
EOSINOPHIL NFR BLD AUTO: 0.1 % (ref 0.3–6.2)
ERYTHROCYTE [DISTWIDTH] IN BLOOD BY AUTOMATED COUNT: 14.4 % (ref 12.3–15.4)
FERRITIN SERPL-MCNC: 351 NG/ML (ref 13–150)
GFR SERPL CREATININE-BSD FRML MDRD: 43 ML/MIN/1.73
GLOBULIN UR ELPH-MCNC: 2.6 GM/DL
GLUCOSE SERPL-MCNC: 111 MG/DL (ref 65–99)
HCT VFR BLD AUTO: 36.7 % (ref 34–46.6)
HGB BLD-MCNC: 12.2 G/DL (ref 12–15.9)
IMM GRANULOCYTES # BLD AUTO: 0.22 10*3/MM3 (ref 0–0.05)
IMM GRANULOCYTES NFR BLD AUTO: 3.1 % (ref 0–0.5)
LYMPHOCYTES # BLD AUTO: 0.99 10*3/MM3 (ref 0.7–3.1)
LYMPHOCYTES NFR BLD AUTO: 13.8 % (ref 19.6–45.3)
MCH RBC QN AUTO: 30.1 PG (ref 26.6–33)
MCHC RBC AUTO-ENTMCNC: 33.2 G/DL (ref 31.5–35.7)
MCV RBC AUTO: 90.6 FL (ref 79–97)
MONOCYTES # BLD AUTO: 0.65 10*3/MM3 (ref 0.1–0.9)
MONOCYTES NFR BLD AUTO: 9 % (ref 5–12)
NEUTROPHILS NFR BLD AUTO: 5.31 10*3/MM3 (ref 1.7–7)
NEUTROPHILS NFR BLD AUTO: 73.7 % (ref 42.7–76)
NRBC BLD AUTO-RTO: 0 /100 WBC (ref 0–0.2)
PLATELET # BLD AUTO: 385 10*3/MM3 (ref 140–450)
PMV BLD AUTO: 9.6 FL (ref 6–12)
POTASSIUM SERPL-SCNC: 4.8 MMOL/L (ref 3.5–5.2)
PROT SERPL-MCNC: 5.6 G/DL (ref 6–8.5)
RBC # BLD AUTO: 4.05 10*6/MM3 (ref 3.77–5.28)
SODIUM SERPL-SCNC: 143 MMOL/L (ref 136–145)
WBC # BLD AUTO: 7.2 10*3/MM3 (ref 3.4–10.8)

## 2020-12-06 PROCEDURE — 94799 UNLISTED PULMONARY SVC/PX: CPT

## 2020-12-06 PROCEDURE — 86140 C-REACTIVE PROTEIN: CPT | Performed by: INTERNAL MEDICINE

## 2020-12-06 PROCEDURE — 85025 COMPLETE CBC W/AUTO DIFF WBC: CPT | Performed by: INTERNAL MEDICINE

## 2020-12-06 PROCEDURE — 82550 ASSAY OF CK (CPK): CPT | Performed by: INTERNAL MEDICINE

## 2020-12-06 PROCEDURE — 80053 COMPREHEN METABOLIC PANEL: CPT | Performed by: INTERNAL MEDICINE

## 2020-12-06 PROCEDURE — 63710000001 DEXAMETHASONE PER 0.25 MG: Performed by: INTERNAL MEDICINE

## 2020-12-06 PROCEDURE — 82728 ASSAY OF FERRITIN: CPT | Performed by: INTERNAL MEDICINE

## 2020-12-06 PROCEDURE — 25010000002 ENOXAPARIN PER 10 MG: Performed by: INTERNAL MEDICINE

## 2020-12-06 RX ORDER — ALBUTEROL SULFATE 90 UG/1
2 AEROSOL, METERED RESPIRATORY (INHALATION)
Qty: 6.7 G | Refills: 0 | Status: SHIPPED | OUTPATIENT
Start: 2020-12-06 | End: 2021-11-02 | Stop reason: SDUPTHER

## 2020-12-06 RX ORDER — DEXAMETHASONE 6 MG/1
6 TABLET ORAL
Qty: 6 TABLET | Refills: 0 | Status: SHIPPED | OUTPATIENT
Start: 2020-12-07 | End: 2020-12-13

## 2020-12-06 RX ORDER — HYDROCODONE BITARTRATE AND ACETAMINOPHEN 5; 325 MG/1; MG/1
1 TABLET ORAL EVERY 6 HOURS PRN
Qty: 12 TABLET | Refills: 0 | Status: SHIPPED | OUTPATIENT
Start: 2020-12-06 | End: 2020-12-09

## 2020-12-06 RX ORDER — BUDESONIDE AND FORMOTEROL FUMARATE DIHYDRATE 160; 4.5 UG/1; UG/1
2 AEROSOL RESPIRATORY (INHALATION)
Qty: 1 INHALER | Refills: 12 | Status: SHIPPED | OUTPATIENT
Start: 2020-12-06 | End: 2020-12-16

## 2020-12-06 RX ADMIN — METOPROLOL SUCCINATE 25 MG: 25 TABLET, EXTENDED RELEASE ORAL at 08:05

## 2020-12-06 RX ADMIN — Medication 1000 UNITS: at 08:05

## 2020-12-06 RX ADMIN — HYDROCODONE BITARTRATE AND ACETAMINOPHEN 1 TABLET: 5; 325 TABLET ORAL at 08:06

## 2020-12-06 RX ADMIN — REMDESIVIR 100 MG: 100 INJECTION, POWDER, LYOPHILIZED, FOR SOLUTION INTRAVENOUS at 12:47

## 2020-12-06 RX ADMIN — AMLODIPINE BESYLATE 5 MG: 5 TABLET ORAL at 08:05

## 2020-12-06 RX ADMIN — ENOXAPARIN SODIUM 40 MG: 40 INJECTION SUBCUTANEOUS at 05:53

## 2020-12-06 RX ADMIN — OXYCODONE HYDROCHLORIDE AND ACETAMINOPHEN 2000 MG: 500 TABLET ORAL at 08:05

## 2020-12-06 RX ADMIN — DEXAMETHASONE 6 MG: 4 TABLET ORAL at 08:05

## 2020-12-06 RX ADMIN — Medication 1000 MCG: at 08:05

## 2020-12-06 RX ADMIN — BUDESONIDE AND FORMOTEROL FUMARATE DIHYDRATE 2 PUFF: 160; 4.5 AEROSOL RESPIRATORY (INHALATION) at 08:44

## 2020-12-06 RX ADMIN — FOLIC ACID 1000 MCG: 1 TABLET ORAL at 08:05

## 2020-12-06 NOTE — OUTREACH NOTE
Prep Survey      Responses   [facility] facility patient discharged from?  [place]   Is LACE score < 7 ?  No   Eligibility  [facility]   Date of Admission  12/02/20   Date of Discharge  12/06/20   Discharge Disposition  Home or Self Care   Discharge diagnosis  acute hypoxic resp failure & PNA d/t COVID-19, Hx chronic CHF   Does the patient have one of the following disease processes/diagnoses(primary or secondary)?  COVID-19   Does the patient have Home health ordered?  No   Is there a DME ordered?  No   Prep survey completed?  Yes          Cherie Benedict RN

## 2020-12-06 NOTE — PLAN OF CARE
Goal Outcome Evaluation:  Plan of Care Reviewed With: patient  Progress: improving     Pt admitted and treated for COVID pneumonia. Currently on RA, VSS. Up ad yana. A&O x4. No c/o SOA. Headache this morning treated with PRN [place]. Plan to d/c home today. Will CTM and provide care.

## 2020-12-06 NOTE — DISCHARGE SUMMARY
[facility] HOSPITALIST   DISCHARGE SUMMARY      Name:  Leonard Best   Age:  70 y.o.  Sex:  female  :  1950  MRN:  [id number]   Visit Number:  [id number]  Primary Care Physician:  Felicia Gloria PA-C  Date of Discharge:  2020  Admission Date:  2020      Discharge Diagnosis:     1.  Covid-19 pneumonia, present on admission  2.  Acute respiratory failure with hypoxia, this is resolved.  3.  Paroxysmal SVT, restarted beta-blocker  4.  High anion gap metabolic acidosis, due to dehydration, this is resolved.  5.  Chronic kidney disease stage IIIb  6.  Hypothyroidism, continue Synthroid.  7.  Chronic diastolic congestive heart failure, stable.  8.  Essential hypertension, better controlled.  Active Hospital Problems    Diagnosis  POA   • Pneumonia due to COVID-19 virus [U07.1, J12.89]  Yes   • Acute respiratory failure with hypoxia (CMS/HCC) [J96.01]  Yes   • Nausea & vomiting [R11.2]  Yes   • Sepsis, unspecified organism (CMS/HCC) [A41.9]  Yes   • Chronic diastolic CHF  [I50.32]  Yes   • CKD (chronic kidney disease) stage 4, GFR 15-29 ml/min (CMS/HCC) [N18.4]  Yes   • Hypothyroidism, acquired [E03.9]  Yes   • PSVT (paroxysmal supraventricular tachycardia) (CMS/[facility]) [I47.1]  Yes      Resolved Hospital Problems   No resolved problems to display.         Presenting Problem/History of Present Illness:    Acute hypoxemic respiratory failure (CMS/HCC) [J96.01]  Pneumonia due to COVID-19 virus [U07.1, J12.89]         Hospital Course:    Patient is a 70-year-old female with history of paroxysmal SVT who comes in with shortness of breath, fatigue, nausea, vomiting, diarrhea.  This has been going on since Saturday prior to admission she states.  She is noted to have hypoxia.  Chest x-ray did show right-sided pneumonia.  Patient was admitted and placed on Decadron as well as remdesivir.  Patient has increased her fluid intake, her creatinine is improved.     Patient is on day  of  remdesivir.    She is now off oxygen. Her inflammatory markers are improved.  She states that if she is shaky from being on Decadron.  Visor that she needs to continue this for 5 more days. She denies any chest pressure, nausea, vomiting, pain.  She is eating better.    She is stable to be discharged home.  She denies any new problems.  She is to return to the hospital if she worsens.  Advised her to self quarantine for 10 days from first symptom for 24 hours without fever.    Follow-up with PCP in 2 weeks.  Follow-up chest x-ray in 1 month.    Procedures Performed:           Consults:     Consults     Date and Time Order Name Status Description    12/2/2020 1311 [facility] (on-call MD unless specified) Details Completed           Pertinent Test Results:     Lab Results (all)     Procedure Component Value Units Date/Time    Blood Culture - Blood, Arm, Left [[id number]] Collected: 12/03/20 1059    Specimen: Blood from Arm, Left Updated: 12/06/20 1145     Blood Culture No growth at 3 days    Blood Culture - Blood, Hand, Right [[id number]] Collected: 12/03/20 1100    Specimen: Blood from Hand, Right Updated: 12/06/20 1145     Blood Culture No growth at 3 days    Ferritin [[id number]]  (Abnormal) Collected: 12/06/20 0451    Specimen: Blood Updated: 12/06/20 0631     Ferritin 351.00 ng/mL     Narrative:      Results may be falsely decreased if patient taking Biotin.      Comprehensive Metabolic Panel [[id number]]  (Abnormal) Collected: 12/06/20 0451    Specimen: Blood Updated: 12/06/20 0629     Glucose 111 mg/dL      BUN 31 mg/dL      Creatinine 1.24 mg/dL      Sodium 143 mmol/L      Potassium 4.8 mmol/L      Comment: Slight hemolysis detected by analyzer. Results may be affected.        Chloride 114 mmol/L      CO2 19.9 mmol/L      Calcium 8.9 mg/dL      Total Protein 5.6 g/dL      Albumin 3.00 g/dL      ALT (SGPT) 13 U/L      AST (SGOT) 15 U/L      Alkaline Phosphatase 98 U/L      Total Bilirubin 0.2 mg/dL      eGFR Non   Amer 43 mL/min/1.73      Globulin 2.6 gm/dL      A/G Ratio 1.2 g/dL      BUN/Creatinine Ratio 25.0     Anion Gap 9.1 mmol/L     Narrative:      GFR Normal >60  Chronic Kidney Disease <60  Kidney Failure <15      CK [[id number]]  (Normal) Collected: 12/06/20 0451    Specimen: Blood Updated: 12/06/20 0625     Creatine Kinase 32 U/L     C-reactive Protein [[id number]]  (Abnormal) Collected: 12/06/20 0451    Specimen: Blood Updated: 12/06/20 0625     C-Reactive Protein 3.47 mg/dL     CBC & Differential [[id number]]  (Abnormal) Collected: 12/06/20 0451    Specimen: Blood Updated: 12/06/20 0558    Narrative:      The following orders were created for panel order CBC & Differential.  Procedure                               Abnormality         Status                     ---------                               -----------         ------                     CBC Auto Differential[[id number]]        Abnormal            Final result                 Please view results for these tests on the individual orders.    CBC Auto Differential [[id number]]  (Abnormal) Collected: 12/06/20 0451    Specimen: Blood Updated: 12/06/20 0558     WBC 7.20 10*3/mm3      RBC 4.05 10*6/mm3      Hemoglobin 12.2 g/dL      Hematocrit 36.7 %      MCV 90.6 fL      MCH 30.1 pg      MCHC 33.2 g/dL      RDW 14.4 %      RDW-SD 47.5 fl      MPV 9.6 fL      Platelets 385 10*3/mm3      Neutrophil % 73.7 %      Lymphocyte % 13.8 %      Monocyte % 9.0 %      Eosinophil % 0.1 %      Basophil % 0.3 %      Immature Grans % 3.1 %      Neutrophils, Absolute 5.31 10*3/mm3      Lymphocytes, Absolute 0.99 10*3/mm3      Monocytes, Absolute 0.65 10*3/mm3      Eosinophils, Absolute 0.01 10*3/mm3      Basophils, Absolute 0.02 10*3/mm3      Immature Grans, Absolute 0.22 10*3/mm3      nRBC 0.0 /100 WBC     Ferritin [[id number]]  (Abnormal) Collected: 12/05/20 0602    Specimen: Blood Updated: 12/05/20 0726     Ferritin 415.00 ng/mL     Narrative:      Results may be falsely decreased  if patient taking Biotin.      Comprehensive Metabolic Panel [[id number]]  (Abnormal) Collected: 12/05/20 0602    Specimen: Blood Updated: 12/05/20 0723     Glucose 95 mg/dL      BUN 34 mg/dL      Creatinine 1.15 mg/dL      Sodium 137 mmol/L      Potassium 4.0 mmol/L      Chloride 110 mmol/L      CO2 18.6 mmol/L      Calcium 8.9 mg/dL      Total Protein 5.9 g/dL      Albumin 3.30 g/dL      ALT (SGPT) 15 U/L      AST (SGOT) 19 U/L      Alkaline Phosphatase 104 U/L      Total Bilirubin 0.2 mg/dL      eGFR Non African Amer 47 mL/min/1.73      Globulin 2.6 gm/dL      A/G Ratio 1.3 g/dL      BUN/Creatinine Ratio 29.6     Anion Gap 8.4 mmol/L     Narrative:      GFR Normal >60  Chronic Kidney Disease <60  Kidney Failure <15      CK [[id number]]  (Normal) Collected: 12/05/20 0602    Specimen: Blood Updated: 12/05/20 0723     Creatine Kinase 45 U/L     C-reactive Protein [[id number]]  (Abnormal) Collected: 12/05/20 0602    Specimen: Blood Updated: 12/05/20 0723     C-Reactive Protein 4.81 mg/dL     Lactate Dehydrogenase [[id number]]  (Abnormal) Collected: 12/05/20 0602    Specimen: Blood Updated: 12/05/20 0723      U/L     D-dimer, Quantitative [[id number]]  (Abnormal) Collected: 12/05/20 0602    Specimen: Blood Updated: 12/05/20 0713     D-Dimer, Quantitative 0.69 MCGFEU/mL     Narrative:      The Stago D-Dimer test used in conjunction with a clinical pretest probability (PTP) assessment model, has been approved by the FDA to rule out the presence of venous thromboembolism (VTE) in outpatients suspected of deep venous thrombosis (DVT) or pulmonary embolism (PE). The cut-off for negative predictive value is <0.50 MCGFEU/mL.    Fibrinogen [[id number]]  (Normal) Collected: 12/05/20 0602    Specimen: Blood Updated: 12/05/20 0713     Fibrinogen 381 mg/dL     CBC & Differential [[id number]]  (Abnormal) Collected: 12/05/20 0602    Specimen: Blood Updated: 12/05/20 0700    Narrative:      The following orders were created for  panel order CBC & Differential.  Procedure                               Abnormality         Status                     ---------                               -----------         ------                     CBC Auto Differential[[id number]]        Abnormal            Final result                 Please view results for these tests on the individual orders.    CBC Auto Differential [[id number]]  (Abnormal) Collected: 12/05/20 0602    Specimen: Blood Updated: 12/05/20 0700     WBC 10.21 10*3/mm3      RBC 4.39 10*6/mm3      Hemoglobin 13.0 g/dL      Hematocrit 38.7 %      MCV 88.2 fL      MCH 29.6 pg      MCHC 33.6 g/dL      RDW 14.3 %      RDW-SD 46.0 fl      MPV 9.7 fL      Platelets 393 10*3/mm3      Neutrophil % 81.3 %      Lymphocyte % 9.1 %      Monocyte % 7.9 %      Eosinophil % 0.0 %      Basophil % 0.2 %      Immature Grans % 1.5 %      Neutrophils, Absolute 8.30 10*3/mm3      Lymphocytes, Absolute 0.93 10*3/mm3      Monocytes, Absolute 0.81 10*3/mm3      Eosinophils, Absolute 0.00 10*3/mm3      Basophils, Absolute 0.02 10*3/mm3      Immature Grans, Absolute 0.15 10*3/mm3      nRBC 0.0 /100 WBC     Ferritin [[id number]]  (Abnormal) Collected: 12/04/20 0521    Specimen: Blood Updated: 12/04/20 0710     Ferritin 427.00 ng/mL     Narrative:      Results may be falsely decreased if patient taking Biotin.      Comprehensive Metabolic Panel [[id number]]  (Abnormal) Collected: 12/04/20 0521    Specimen: Blood Updated: 12/04/20 0706     Glucose 107 mg/dL      BUN 36 mg/dL      Creatinine 1.05 mg/dL      Sodium 140 mmol/L      Potassium 4.2 mmol/L      Chloride 112 mmol/L      CO2 18.3 mmol/L      Calcium 9.1 mg/dL      Total Protein 6.0 g/dL      Albumin 3.30 g/dL      ALT (SGPT) 11 U/L      AST (SGOT) 18 U/L      Alkaline Phosphatase 103 U/L      Total Bilirubin 0.2 mg/dL      eGFR Non African Amer 52 mL/min/1.73      Globulin 2.7 gm/dL      A/G Ratio 1.2 g/dL      BUN/Creatinine Ratio 34.3     Anion Gap 9.7 mmol/L      Narrative:      GFR Normal >60  Chronic Kidney Disease <60  Kidney Failure <15      CK [[id number]]  (Normal) Collected: 12/04/20 0521    Specimen: Blood Updated: 12/04/20 0705     Creatine Kinase 48 U/L     C-reactive Protein [[id number]]  (Abnormal) Collected: 12/04/20 0521    Specimen: Blood Updated: 12/04/20 0705     C-Reactive Protein 9.95 mg/dL     CBC & Differential [[id number]]  (Abnormal) Collected: 12/04/20 0521    Specimen: Blood Updated: 12/04/20 0647    Narrative:      The following orders were created for panel order CBC & Differential.  Procedure                               Abnormality         Status                     ---------                               -----------         ------                     CBC Auto Differential[[id number]]        Abnormal            Final result                 Please view results for these tests on the individual orders.    CBC Auto Differential [[id number]]  (Abnormal) Collected: 12/04/20 0521    Specimen: Blood Updated: 12/04/20 0647     WBC 5.98 10*3/mm3      RBC 6.08 10*6/mm3      Hemoglobin 18.4 g/dL      Hematocrit 53.9 %      MCV 88.7 fL      MCH 30.3 pg      MCHC 34.1 g/dL      RDW 15.1 %      RDW-SD 46.8 fl      MPV 10.0 fL      Platelets 172 10*3/mm3      Neutrophil % 83.8 %      Lymphocyte % 9.0 %      Monocyte % 5.9 %      Eosinophil % 0.0 %      Basophil % 0.3 %      Immature Grans % 1.0 %      Neutrophils, Absolute 5.01 10*3/mm3      Lymphocytes, Absolute 0.54 10*3/mm3      Monocytes, Absolute 0.35 10*3/mm3      Eosinophils, Absolute 0.00 10*3/mm3      Basophils, Absolute 0.02 10*3/mm3      Immature Grans, Absolute 0.06 10*3/mm3      nRBC 0.2 /100 WBC     C-reactive Protein [[id number]]  (Abnormal) Collected: 12/03/20 0221    Specimen: Blood Updated: 12/03/20 0511     C-Reactive Protein 20.15 mg/dL     Troponin [[id number]]  (Normal) Collected: 12/03/20 0221    Specimen: Blood Updated: 12/03/20 0430     Troponin T <0.010 ng/mL     Narrative:      Troponin T  Reference Range:  <= 0.03 ng/mL-   Negative for AMI  >0.03 ng/mL-     Abnormal for myocardial necrosis.  Clinicians would have to utilize clinical acumen, EKG, Troponin and serial changes to determine if it is an Acute Myocardial Infarction or myocardial injury due to an underlying chronic condition.       Results may be falsely decreased if patient taking Biotin.      Ferritin [[id number]]  (Abnormal) Collected: 12/03/20 0221    Specimen: Blood Updated: 12/03/20 0430     Ferritin 382.00 ng/mL     Narrative:      Results may be falsely decreased if patient taking Biotin.      Fibrinogen [[id number]]  (Abnormal) Collected: 12/03/20 0221    Specimen: Blood Updated: 12/03/20 0426     Fibrinogen 510 mg/dL     Comprehensive Metabolic Panel [[id number]]  (Abnormal) Collected: 12/03/20 0221    Specimen: Blood Updated: 12/03/20 0426     Glucose 117 mg/dL      BUN 30 mg/dL      Creatinine 1.13 mg/dL      Sodium 137 mmol/L      Potassium 4.4 mmol/L      Chloride 108 mmol/L      CO2 17.3 mmol/L      Calcium 8.6 mg/dL      Total Protein 6.0 g/dL      Albumin 3.20 g/dL      ALT (SGPT) 11 U/L      AST (SGOT) 17 U/L      Alkaline Phosphatase 104 U/L      Total Bilirubin 0.2 mg/dL      eGFR Non African Amer 48 mL/min/1.73      Globulin 2.8 gm/dL      A/G Ratio 1.1 g/dL      BUN/Creatinine Ratio 26.5     Anion Gap 11.7 mmol/L     Narrative:      GFR Normal >60  Chronic Kidney Disease <60  Kidney Failure <15      D-dimer, Quantitative [[id number]]  (Abnormal) Collected: 12/03/20 0221    Specimen: Blood Updated: 12/03/20 0426     D-Dimer, Quantitative 0.75 MCGFEU/mL     Narrative:      The Stago D-Dimer test used in conjunction with a clinical pretest probability (PTP) assessment model, has been approved by the FDA to rule out the presence of venous thromboembolism (VTE) in outpatients suspected of deep venous thrombosis (DVT) or pulmonary embolism (PE). The cut-off for negative predictive value is <0.50 MCGFEU/mL.    Magnesium  [[id number]]  (Normal) Collected: 12/03/20 0221    Specimen: Blood Updated: 12/03/20 0422     Magnesium 2.2 mg/dL     CK [[id number]]  (Normal) Collected: 12/03/20 0221    Specimen: Blood Updated: 12/03/20 0422     Creatine Kinase 65 U/L     Lactate Dehydrogenase [[id number]]  (Abnormal) Collected: 12/03/20 0221    Specimen: Blood Updated: 12/03/20 0422      U/L     CBC & Differential [[id number]]  (Abnormal) Collected: 12/03/20 0221    Specimen: Blood Updated: 12/03/20 0352    Narrative:      The following orders were created for panel order CBC & Differential.  Procedure                               Abnormality         Status                     ---------                               -----------         ------                     CBC Auto Differential[[id number]]        Abnormal            Final result                 Please view results for these tests on the individual orders.    CBC Auto Differential [[id number]]  (Abnormal) Collected: 12/03/20 0221    Specimen: Blood Updated: 12/03/20 0352     WBC 5.49 10*3/mm3      RBC 4.33 10*6/mm3      Hemoglobin 12.9 g/dL      Hematocrit 38.9 %      MCV 89.8 fL      MCH 29.8 pg      MCHC 33.2 g/dL      RDW 14.2 %      RDW-SD 46.0 fl      MPV 10.4 fL      Platelets 266 10*3/mm3      Neutrophil % 79.4 %      Lymphocyte % 14.2 %      Monocyte % 5.5 %      Eosinophil % 0.0 %      Basophil % 0.2 %      Immature Grans % 0.7 %      Neutrophils, Absolute 4.36 10*3/mm3      Lymphocytes, Absolute 0.78 10*3/mm3      Monocytes, Absolute 0.30 10*3/mm3      Eosinophils, Absolute 0.00 10*3/mm3      Basophils, Absolute 0.01 10*3/mm3      Immature Grans, Absolute 0.04 10*3/mm3      nRBC 0.0 /100 WBC     Troponin [[id number]]  (Normal) Collected: 12/02/20 2033    Specimen: Blood from Hand, Left Updated: 12/02/20 2106     Troponin T <0.010 ng/mL     Narrative:      Troponin T Reference Range:  <= 0.03 ng/mL-   Negative for AMI  >0.03 ng/mL-     Abnormal for myocardial necrosis.  Clinicians  "would have to utilize clinical acumen, EKG, Troponin and serial changes to determine if it is an Acute Myocardial Infarction or myocardial injury due to an underlying chronic condition.       Results may be falsely decreased if patient taking Biotin.      Procalcitonin [[id number]]  (Abnormal) Collected: 12/02/20 1203    Specimen: Blood Updated: 12/02/20 1320     Procalcitonin 0.26 ng/mL     Narrative:      As a Marker for Sepsis (Non-Neonates):   1. <0.5 ng/mL represents a low risk of severe sepsis and/or septic shock.  1. >2 ng/mL represents a high risk of severe sepsis and/or septic shock.    As a Marker for Lower Respiratory Tract Infections that require antibiotic therapy:  PCT on Admission     Antibiotic Therapy             6-12 Hrs later  > 0.5                Strongly Recommended            >0.25 - <0.5         Recommended  0.1 - 0.25           Discouraged                   Remeasure/reassess PCT  <0.1                 Strongly Discouraged          Remeasure/reassess PCT      As 28 day mortality risk marker: \"Change in Procalcitonin Result\" (> 80 % or <=80 %) if Day 0 (or Day 1) and Day 4 values are available. Refer to http://www.[employer]-pct-calculator.com/   Change in PCT <=80 %   A decrease of PCT levels below or equal to 80 % defines a positive change in PCT test result representing a higher risk for 28-day all-cause mortality of patients diagnosed with severe sepsis or septic shock.  Change in PCT > 80 %   A decrease of PCT levels of more than 80 % defines a negative change in PCT result representing a lower risk for 28-day all-cause mortality of patients diagnosed with severe sepsis or septic shock.                Results may be falsely decreased if patient taking Biotin.     Ferritin [[id number]]  (Abnormal) Collected: 12/02/20 1203    Specimen: Blood Updated: 12/02/20 1317     Ferritin 436.00 ng/mL     Narrative:      Results may be falsely decreased if patient taking Biotin.      Comprehensive Metabolic " Panel [[id number]]  (Abnormal) Collected: 12/02/20 1203    Specimen: Blood Updated: 12/02/20 1314     Glucose 109 mg/dL      BUN 33 mg/dL      Creatinine 1.61 mg/dL      Sodium 134 mmol/L      Potassium 4.0 mmol/L      Chloride 99 mmol/L      CO2 18.3 mmol/L      Calcium 9.9 mg/dL      Total Protein 8.1 g/dL      Albumin 4.20 g/dL      ALT (SGPT) 12 U/L      AST (SGOT) 24 U/L      Alkaline Phosphatase 148 U/L      Total Bilirubin 0.5 mg/dL      eGFR Non African Amer 32 mL/min/1.73      Globulin 3.9 gm/dL      A/G Ratio 1.1 g/dL      BUN/Creatinine Ratio 20.5     Anion Gap 16.7 mmol/L     Narrative:      GFR Normal >60  Chronic Kidney Disease <60  Kidney Failure <15      Lactate Dehydrogenase [[id number]]  (Abnormal) Collected: 12/02/20 1203    Specimen: Blood Updated: 12/02/20 1314      U/L     C-reactive Protein [[id number]]  (Abnormal) Collected: 12/02/20 1203    Specimen: Blood Updated: 12/02/20 1314     C-Reactive Protein 28.19 mg/dL     Lactic Acid, Plasma [[id number]]  (Normal) Collected: 12/02/20 1202    Specimen: Blood Updated: 12/02/20 1310     Lactate 1.9 mmol/L     CBC & Differential [[id number]]  (Abnormal) Collected: 12/02/20 1202    Specimen: Blood Updated: 12/02/20 1243    Narrative:      The following orders were created for panel order CBC & Differential.  Procedure                               Abnormality         Status                     ---------                               -----------         ------                     CBC Auto Differential[[id number]]        Abnormal            Final result                 Please view results for these tests on the individual orders.    CBC Auto Differential [[id number]]  (Abnormal) Collected: 12/02/20 1202    Specimen: Blood Updated: 12/02/20 1243     WBC 12.09 10*3/mm3      RBC 5.45 10*6/mm3      Hemoglobin 16.1 g/dL      Hematocrit 48.5 %      MCV 89.0 fL      MCH 29.5 pg      MCHC 33.2 g/dL      RDW 14.4 %      RDW-SD 46.1 fl      MPV 10.3 fL       "Platelets 325 10*3/mm3      Neutrophil % 81.3 %      Lymphocyte % 8.9 %      Monocyte % 9.0 %      Eosinophil % 0.0 %      Basophil % 0.2 %      Immature Grans % 0.6 %      Neutrophils, Absolute 9.82 10*3/mm3      Lymphocytes, Absolute 1.08 10*3/mm3      Monocytes, Absolute 1.09 10*3/mm3      Eosinophils, Absolute 0.00 10*3/mm3      Basophils, Absolute 0.03 10*3/mm3      Immature Grans, Absolute 0.07 10*3/mm3      nRBC 0.0 /100 WBC     Blood Gas, Arterial - [[id number]]  (Abnormal) Collected: 12/02/20 1236    Specimen: Arterial Blood Updated: 12/02/20 1242     Site Arterial: right radial     Kb's Test Positive     pH, Arterial 7.383 pH units      pCO2, Arterial 27.1 mm Hg      pO2, Arterial 79.5 mm Hg      HCO3, Arterial 16.2 mmol/L      Base Excess, Arterial -7.1 mmol/L      O2 Saturation Calculated 95.7 %      Barometric Pressure for Blood Gas 760.6 mmHg      Modality Cannula     Flow Rate 1 lpm      Set [facility] Resp Rate 26          Imaging Results (All)     Procedure Component Value Units Date/Time    XR Chest AP [[id number]] Collected: 12/02/20 1301     Updated: 12/02/20 1304    Narrative:      CHEST SINGLE VIEW     HISTORY: Shortness of air, cough, COVID-19 positive.     COMPARISON: 2-view chest 12/22/2017.     FINDINGS: There is hazy peripheral predominant infiltrates within the  right mid to lower lung consistent with COVID-19 infiltrate. The heart  and mediastinal structures appear within normal limits. No perihilar  edema is evident. Aortic vascular calcifications are present.       Impression:      Hazy peripheral predominant infiltrates within the right mid  to lower lung consistent with COVID-19 infiltrates.     This report was finalized on 12/2/2020 1:01 PM by Dr. Flako Clements M.D.             Condition on Discharge:      Stable    Vital Signs:    /97 (BP Location: Left arm, Patient Position: Sitting)   Pulse 65   Temp 97.3 °F (36.3 °C) (Oral)   Resp 18   Ht 167.6 cm (66\")   Wt 93.1 kg " (205 lb 4 oz)   LMP  (LMP Unknown)   SpO2 95%   BMI 33.13 kg/m²     Physical Exam:    General: Alert and oriented x4, mild distress.  Heart: Regular rate and rhythm without murmur rub or thrill  Lungs: Rhonchi noted on the right.  No use of accessory muscles respiration.  Abdomen: Soft/nontender/nondistended.  No paraspinal megaly is noted.  MSK: 4/5 strength in upper/lower extremities bilaterally.    Discharge Disposition:    Home or Self Care    Discharge Medication:       Discharge Medications      New Medications      Instructions Start Date   albuterol sulfate  (90 Base) MCG/ACT inhaler  Commonly known as: PROVENTIL HFA;VENTOLIN HFA;PROAIR HFA   2 puffs, Inhalation, Every 2 Hours PRN      ascorbic acid 1000 MG tablet  Commonly known as: VITAMIN C   1,000 mg, Oral, Daily   Start Date: December 7, 2020     budesonide-formoterol 160-4.5 MCG/ACT inhaler  Commonly known as: SYMBICORT   2 puffs, Inhalation, 2 Times Daily - RT      dexamethasone 6 MG tablet  Commonly known as: DECADRON   6 mg, Oral, Daily With Breakfast   Start Date: December 7, 2020     HYDROcodone-acetaminophen 5-325 MG per tablet  Commonly known as: NORCO   1 tablet, Oral, Every 6 Hours PRN         Continue These Medications      Instructions Start Date   amLODIPine 5 MG tablet  Commonly known as: NORVASC   5 mg, Oral, Daily, For BP      Cholecalciferol 1.25 MG (74973 UT) tablet   One PO once weekly      folic acid 1 MG tablet  Commonly known as: FOLVITE   Take 1 tablet by mouth once daily      levothyroxine 25 MCG tablet  Commonly known as: SYNTHROID, LEVOTHROID   25 mcg, Oral, Daily, One PO daily before breakfast for thyroid.  Skip Sunday dose.      metoprolol succinate XL 25 MG 24 hr tablet  Commonly known as: TOPROL-XL   25 mg, Oral, Daily, For heart and BP      rosuvastatin 20 MG tablet  Commonly known as: CRESTOR   TAKE 1 TABLET BY MOUTH ONCE DAILY FOR CHOLESTEROL      Vitamin B-12 1000 MCG sublingual tablet   DISSOLVE 1 LOZENGE BY  MOUTH ONCE DAILY             Discharge Diet:     Diet Instructions     Diet: Regular, Cardiac      Discharge Diet:  Regular  Cardiac             Activity at Discharge:     Activity Instructions     Activity as Tolerated            Follow-up Appointments:    No future appointments.  Additional Instructions for the Follow-ups that You Need to Schedule     Discharge Follow-up with PCP   As directed       Currently Documented PCP:    Felicia Gloria, JOSIAH    PCP Phone Number:    [phone]     Follow Up Details: 2 weeks               Test Results Pending at Discharge:    Pending Labs     Order Current Status    Blood Culture - Blood, Arm, Left Preliminary result    Blood Culture - Blood, Hand, Right Preliminary result             Seb Worley DO  12/06/20  12:02 EST

## 2020-12-06 NOTE — PLAN OF CARE
Goal Outcome Evaluation:  Plan of Care Reviewed With: patient  Progress: no change     Vitals signs stable as charted / A&Ox4 / on room air / up ad yana / no voiced complains on ths tour / will continue to monitor.

## 2020-12-07 NOTE — PAYOR COMM NOTE
"Nasir, Michael (70 y.o. Female)    PLEASE SEE ATTACHED DC SUMMARY      REF#[id number]    THANK YOU    ELLEN MASON LPN CCP     Date of Birth Social Security Number Address Home Phone MRN    1950  [id number] [facility] [phone] [id number]    [employer] Marital Status          None        Admission Date Admission Type Admitting Provider Attending Provider Department, Room/Bed    12/2/20 Emergency Melissa Valadez MD  [address], [address]/1    Discharge Date Discharge Disposition Discharge Destination        12/6/2020 Home or Self Care              Attending Provider: (none)   Allergies: Sulfa Antibiotics, Ciprofloxacin, Levofloxacin, Macrodantin [Nitrofurantoin]    Isolation: Enh Drop/Con   Infection: COVID (confirmed) (12/02/20)   Code Status: Prior    Ht: 167.6 cm (66\")   Wt: 93.1 kg (205 lb 4 oz)    Admission Cmt: None   Principal Problem: None                Active Insurance as of 12/2/2020     Primary Coverage     Payor Plan Insurance Group Employer/Plan Group    [employer]ERCIAL AETNA [id number]     Payor Plan Address Payor Plan Phone Number Payor Plan Fax Number Effective Dates    [address] [phone]  1/1/2016 - None Entered    [place] [facility] [address]       Subscriber Name Subscriber Birth Date Member ID       JORGE BEST 6/27/1957 [id number]           Secondary Coverage     Payor Plan Insurance Group Employer/Plan Group    MEDICARE MEDICARE A & B      Payor Plan Address Payor Plan Phone Number Payor Plan Fax Number Effective Dates    [address] [phone]  6/1/2016 - None Entered    [facility] [address]       Subscriber Name Subscriber Birth Date Member ID       MICHAEL BEST 1950 [id number]                 Emergency Contacts      (Rel.) Home Phone Work Phone Mobile Phone    Marlon Best (Spouse) [phone] -- [phone]    CAL SAUNDERS (Daughter) [phone] -- --    Liz Best " (Daughter) -- -- --               Discharge Summary      Hinsderick Seb LINNEA, DO at 20 1158              [facility] HOSPITALIST   DISCHARGE SUMMARY      Name:  Leonard Best   Age:  70 y.o.  Sex:  female  :  1950  MRN:  [id number]   Visit Number:  [id number]  Primary Care Physician:  Felicia Gloria PA-C  Date of Discharge:  2020  Admission Date:  2020      Discharge Diagnosis:     1.  Covid-19 pneumonia, present on admission  2.  Acute respiratory failure with hypoxia, this is resolved.  3.  Paroxysmal SVT, restarted beta-blocker  4.  High anion gap metabolic acidosis, due to dehydration, this is resolved.  5.  Chronic kidney disease stage IIIb  6.  Hypothyroidism, continue Synthroid.  7.  Chronic diastolic congestive heart failure, stable.  8.  Essential hypertension, better controlled.  Active Hospital Problems    Diagnosis  POA   • Pneumonia due to COVID-19 virus [U07.1, J12.89]  Yes   • Acute respiratory failure with hypoxia (CMS/[facility]) [J96.01]  Yes   • Nausea & vomiting [R11.2]  Yes   • Sepsis, unspecified organism (CMS/HCC) [A41.9]  Yes   • Chronic diastolic CHF  [I50.32]  Yes   • CKD (chronic kidney disease) stage 4, GFR 15-29 ml/min (CMS/HCC) [N18.4]  Yes   • Hypothyroidism, acquired [E03.9]  Yes   • PSVT (paroxysmal supraventricular tachycardia) (CMS/[facility]) [I47.1]  Yes      Resolved Hospital Problems   No resolved problems to display.         Presenting Problem/History of Present Illness:    Acute hypoxemic respiratory failure (CMS/HCC) [J96.01]  Pneumonia due to COVID-19 virus [U07.1, J12.89]         Hospital Course:    Patient is a 70-year-old female with history of paroxysmal SVT who comes in with shortness of breath, fatigue, nausea, vomiting, diarrhea.  This has been going on since Saturday prior to admission she states.  She is noted to have hypoxia.  Chest x-ray did show right-sided pneumonia.  Patient was admitted and placed on Decadron as well as remdesivir.   Patient has increased her fluid intake, her creatinine is improved.     Patient is on day 5/5 of remdesivir.    She is now off oxygen. Her inflammatory markers are improved.  She states that if she is shaky from being on Decadron.  Visor that she needs to continue this for 5 more days. She denies any chest pressure, nausea, vomiting, pain.  She is eating better.    She is stable to be discharged home.  She denies any new problems.  She is to return to the hospital if she worsens.  Advised her to self quarantine for 10 days from first symptom for 24 hours without fever.    Follow-up with PCP in 2 weeks.  Follow-up chest x-ray in 1 month.    Procedures Performed:           Consults:     Consults     Date and Time Order Name Status Description    12/2/2020 1311 [facility] (on-call MD unless specified) Details Completed           Pertinent Test Results:     Lab Results (all)     Procedure Component Value Units Date/Time    Blood Culture - Blood, Arm, Left [[id number]] Collected: 12/03/20 1059    Specimen: Blood from Arm, Left Updated: 12/06/20 1145     Blood Culture No growth at 3 days    Blood Culture - Blood, Hand, Right [[id number]] Collected: 12/03/20 1100    Specimen: Blood from Hand, Right Updated: 12/06/20 1145     Blood Culture No growth at 3 days    Ferritin [[id number]]  (Abnormal) Collected: 12/06/20 0451    Specimen: Blood Updated: 12/06/20 0631     Ferritin 351.00 ng/mL     Narrative:      Results may be falsely decreased if patient taking Biotin.      Comprehensive Metabolic Panel [[id number]]  (Abnormal) Collected: 12/06/20 0451    Specimen: Blood Updated: 12/06/20 0629     Glucose 111 mg/dL      BUN 31 mg/dL      Creatinine 1.24 mg/dL      Sodium 143 mmol/L      Potassium 4.8 mmol/L      Comment: Slight hemolysis detected by analyzer. Results may be affected.        Chloride 114 mmol/L      CO2 19.9 mmol/L      Calcium 8.9 mg/dL      Total Protein 5.6 g/dL      Albumin 3.00 g/dL      ALT (SGPT) 13 U/L      AST  (SGOT) 15 U/L      Alkaline Phosphatase 98 U/L      Total Bilirubin 0.2 mg/dL      eGFR Non African Amer 43 mL/min/1.73      Globulin 2.6 gm/dL      A/G Ratio 1.2 g/dL      BUN/Creatinine Ratio 25.0     Anion Gap 9.1 mmol/L     Narrative:      GFR Normal >60  Chronic Kidney Disease <60  Kidney Failure <15      CK [[id number]]  (Normal) Collected: 12/06/20 0451    Specimen: Blood Updated: 12/06/20 0625     Creatine Kinase 32 U/L     C-reactive Protein [[id number]]  (Abnormal) Collected: 12/06/20 0451    Specimen: Blood Updated: 12/06/20 0625     C-Reactive Protein 3.47 mg/dL     CBC & Differential [[id number]]  (Abnormal) Collected: 12/06/20 0451    Specimen: Blood Updated: 12/06/20 0558    Narrative:      The following orders were created for panel order CBC & Differential.  Procedure                               Abnormality         Status                     ---------                               -----------         ------                     CBC Auto Differential[[id number]]        Abnormal            Final result                 Please view results for these tests on the individual orders.    CBC Auto Differential [[id number]]  (Abnormal) Collected: 12/06/20 0451    Specimen: Blood Updated: 12/06/20 0558     WBC 7.20 10*3/mm3      RBC 4.05 10*6/mm3      Hemoglobin 12.2 g/dL      Hematocrit 36.7 %      MCV 90.6 fL      MCH 30.1 pg      MCHC 33.2 g/dL      RDW 14.4 %      RDW-SD 47.5 fl      MPV 9.6 fL      Platelets 385 10*3/mm3      Neutrophil % 73.7 %      Lymphocyte % 13.8 %      Monocyte % 9.0 %      Eosinophil % 0.1 %      Basophil % 0.3 %      Immature Grans % 3.1 %      Neutrophils, Absolute 5.31 10*3/mm3      Lymphocytes, Absolute 0.99 10*3/mm3      Monocytes, Absolute 0.65 10*3/mm3      Eosinophils, Absolute 0.01 10*3/mm3      Basophils, Absolute 0.02 10*3/mm3      Immature Grans, Absolute 0.22 10*3/mm3      nRBC 0.0 /100 WBC     Ferritin [[id number]]  (Abnormal) Collected: 12/05/20 0602    Specimen: Blood  Updated: 12/05/20 0726     Ferritin 415.00 ng/mL     Narrative:      Results may be falsely decreased if patient taking Biotin.      Comprehensive Metabolic Panel [[id number]]  (Abnormal) Collected: 12/05/20 0602    Specimen: Blood Updated: 12/05/20 0723     Glucose 95 mg/dL      BUN 34 mg/dL      Creatinine 1.15 mg/dL      Sodium 137 mmol/L      Potassium 4.0 mmol/L      Chloride 110 mmol/L      CO2 18.6 mmol/L      Calcium 8.9 mg/dL      Total Protein 5.9 g/dL      Albumin 3.30 g/dL      ALT (SGPT) 15 U/L      AST (SGOT) 19 U/L      Alkaline Phosphatase 104 U/L      Total Bilirubin 0.2 mg/dL      eGFR Non African Amer 47 mL/min/1.73      Globulin 2.6 gm/dL      A/G Ratio 1.3 g/dL      BUN/Creatinine Ratio 29.6     Anion Gap 8.4 mmol/L     Narrative:      GFR Normal >60  Chronic Kidney Disease <60  Kidney Failure <15      CK [[id number]]  (Normal) Collected: 12/05/20 0602    Specimen: Blood Updated: 12/05/20 0723     Creatine Kinase 45 U/L     C-reactive Protein [[id number]]  (Abnormal) Collected: 12/05/20 0602    Specimen: Blood Updated: 12/05/20 0723     C-Reactive Protein 4.81 mg/dL     Lactate Dehydrogenase [[id number]]  (Abnormal) Collected: 12/05/20 0602    Specimen: Blood Updated: 12/05/20 0723      U/L     D-dimer, Quantitative [[id number]]  (Abnormal) Collected: 12/05/20 0602    Specimen: Blood Updated: 12/05/20 0713     D-Dimer, Quantitative 0.69 MCGFEU/mL     Narrative:      The Stago D-Dimer test used in conjunction with a clinical pretest probability (PTP) assessment model, has been approved by the FDA to rule out the presence of venous thromboembolism (VTE) in outpatients suspected of deep venous thrombosis (DVT) or pulmonary embolism (PE). The cut-off for negative predictive value is <0.50 MCGFEU/mL.    Fibrinogen [[id number]]  (Normal) Collected: 12/05/20 0602    Specimen: Blood Updated: 12/05/20 0713     Fibrinogen 381 mg/dL     CBC & Differential [[id number]]  (Abnormal) Collected: 12/05/20 0602     Specimen: Blood Updated: 12/05/20 0700    Narrative:      The following orders were created for panel order CBC & Differential.  Procedure                               Abnormality         Status                     ---------                               -----------         ------                     CBC Auto Differential[[id number]]        Abnormal            Final result                 Please view results for these tests on the individual orders.    CBC Auto Differential [[id number]]  (Abnormal) Collected: 12/05/20 0602    Specimen: Blood Updated: 12/05/20 0700     WBC 10.21 10*3/mm3      RBC 4.39 10*6/mm3      Hemoglobin 13.0 g/dL      Hematocrit 38.7 %      MCV 88.2 fL      MCH 29.6 pg      MCHC 33.6 g/dL      RDW 14.3 %      RDW-SD 46.0 fl      MPV 9.7 fL      Platelets 393 10*3/mm3      Neutrophil % 81.3 %      Lymphocyte % 9.1 %      Monocyte % 7.9 %      Eosinophil % 0.0 %      Basophil % 0.2 %      Immature Grans % 1.5 %      Neutrophils, Absolute 8.30 10*3/mm3      Lymphocytes, Absolute 0.93 10*3/mm3      Monocytes, Absolute 0.81 10*3/mm3      Eosinophils, Absolute 0.00 10*3/mm3      Basophils, Absolute 0.02 10*3/mm3      Immature Grans, Absolute 0.15 10*3/mm3      nRBC 0.0 /100 WBC     Ferritin [[id number]]  (Abnormal) Collected: 12/04/20 0521    Specimen: Blood Updated: 12/04/20 0710     Ferritin 427.00 ng/mL     Narrative:      Results may be falsely decreased if patient taking Biotin.      Comprehensive Metabolic Panel [[id number]]  (Abnormal) Collected: 12/04/20 0521    Specimen: Blood Updated: 12/04/20 0706     Glucose 107 mg/dL      BUN 36 mg/dL      Creatinine 1.05 mg/dL      Sodium 140 mmol/L      Potassium 4.2 mmol/L      Chloride 112 mmol/L      CO2 18.3 mmol/L      Calcium 9.1 mg/dL      Total Protein 6.0 g/dL      Albumin 3.30 g/dL      ALT (SGPT) 11 U/L      AST (SGOT) 18 U/L      Alkaline Phosphatase 103 U/L      Total Bilirubin 0.2 mg/dL      eGFR Non African Amer 52 mL/min/1.73       Globulin 2.7 gm/dL      A/G Ratio 1.2 g/dL      BUN/Creatinine Ratio 34.3     Anion Gap 9.7 mmol/L     Narrative:      GFR Normal >60  Chronic Kidney Disease <60  Kidney Failure <15      CK [[id number]]  (Normal) Collected: 12/04/20 0521    Specimen: Blood Updated: 12/04/20 0705     Creatine Kinase 48 U/L     C-reactive Protein [[id number]]  (Abnormal) Collected: 12/04/20 0521    Specimen: Blood Updated: 12/04/20 0705     C-Reactive Protein 9.95 mg/dL     CBC & Differential [[id number]]  (Abnormal) Collected: 12/04/20 0521    Specimen: Blood Updated: 12/04/20 0647    Narrative:      The following orders were created for panel order CBC & Differential.  Procedure                               Abnormality         Status                     ---------                               -----------         ------                     CBC Auto Differential[[id number]]        Abnormal            Final result                 Please view results for these tests on the individual orders.    CBC Auto Differential [[id number]]  (Abnormal) Collected: 12/04/20 0521    Specimen: Blood Updated: 12/04/20 0647     WBC 5.98 10*3/mm3      RBC 6.08 10*6/mm3      Hemoglobin 18.4 g/dL      Hematocrit 53.9 %      MCV 88.7 fL      MCH 30.3 pg      MCHC 34.1 g/dL      RDW 15.1 %      RDW-SD 46.8 fl      MPV 10.0 fL      Platelets 172 10*3/mm3      Neutrophil % 83.8 %      Lymphocyte % 9.0 %      Monocyte % 5.9 %      Eosinophil % 0.0 %      Basophil % 0.3 %      Immature Grans % 1.0 %      Neutrophils, Absolute 5.01 10*3/mm3      Lymphocytes, Absolute 0.54 10*3/mm3      Monocytes, Absolute 0.35 10*3/mm3      Eosinophils, Absolute 0.00 10*3/mm3      Basophils, Absolute 0.02 10*3/mm3      Immature Grans, Absolute 0.06 10*3/mm3      nRBC 0.2 /100 WBC     C-reactive Protein [[id number]]  (Abnormal) Collected: 12/03/20 0221    Specimen: Blood Updated: 12/03/20 0511     C-Reactive Protein 20.15 mg/dL     Troponin [[id number]]  (Normal) Collected: 12/03/20 0222     Specimen: Blood Updated: 12/03/20 0430     Troponin T <0.010 ng/mL     Narrative:      Troponin T Reference Range:  <= 0.03 ng/mL-   Negative for AMI  >0.03 ng/mL-     Abnormal for myocardial necrosis.  Clinicians would have to utilize clinical acumen, EKG, Troponin and serial changes to determine if it is an Acute Myocardial Infarction or myocardial injury due to an underlying chronic condition.       Results may be falsely decreased if patient taking Biotin.      Ferritin [[id number]]  (Abnormal) Collected: 12/03/20 0221    Specimen: Blood Updated: 12/03/20 0430     Ferritin 382.00 ng/mL     Narrative:      Results may be falsely decreased if patient taking Biotin.      Fibrinogen [[id number]]  (Abnormal) Collected: 12/03/20 0221    Specimen: Blood Updated: 12/03/20 0426     Fibrinogen 510 mg/dL     Comprehensive Metabolic Panel [[id number]]  (Abnormal) Collected: 12/03/20 0221    Specimen: Blood Updated: 12/03/20 0426     Glucose 117 mg/dL      BUN 30 mg/dL      Creatinine 1.13 mg/dL      Sodium 137 mmol/L      Potassium 4.4 mmol/L      Chloride 108 mmol/L      CO2 17.3 mmol/L      Calcium 8.6 mg/dL      Total Protein 6.0 g/dL      Albumin 3.20 g/dL      ALT (SGPT) 11 U/L      AST (SGOT) 17 U/L      Alkaline Phosphatase 104 U/L      Total Bilirubin 0.2 mg/dL      eGFR Non African Amer 48 mL/min/1.73      Globulin 2.8 gm/dL      A/G Ratio 1.1 g/dL      BUN/Creatinine Ratio 26.5     Anion Gap 11.7 mmol/L     Narrative:      GFR Normal >60  Chronic Kidney Disease <60  Kidney Failure <15      D-dimer, Quantitative [[id number]]  (Abnormal) Collected: 12/03/20 0221    Specimen: Blood Updated: 12/03/20 0426     D-Dimer, Quantitative 0.75 MCGFEU/mL     Narrative:      The Stago D-Dimer test used in conjunction with a clinical pretest probability (PTP) assessment model, has been approved by the FDA to rule out the presence of venous thromboembolism (VTE) in outpatients suspected of deep venous thrombosis (DVT) or pulmonary  embolism (PE). The cut-off for negative predictive value is <0.50 MCGFEU/mL.    Magnesium [[id number]]  (Normal) Collected: 12/03/20 0221    Specimen: Blood Updated: 12/03/20 0422     Magnesium 2.2 mg/dL     CK [[id number]]  (Normal) Collected: 12/03/20 0221    Specimen: Blood Updated: 12/03/20 0422     Creatine Kinase 65 U/L     Lactate Dehydrogenase [[id number]]  (Abnormal) Collected: 12/03/20 0221    Specimen: Blood Updated: 12/03/20 0422      U/L     CBC & Differential [[id number]]  (Abnormal) Collected: 12/03/20 0221    Specimen: Blood Updated: 12/03/20 0352    Narrative:      The following orders were created for panel order CBC & Differential.  Procedure                               Abnormality         Status                     ---------                               -----------         ------                     CBC Auto Differential[[id number]]        Abnormal            Final result                 Please view results for these tests on the individual orders.    CBC Auto Differential [[id number]]  (Abnormal) Collected: 12/03/20 0221    Specimen: Blood Updated: 12/03/20 0352     WBC 5.49 10*3/mm3      RBC 4.33 10*6/mm3      Hemoglobin 12.9 g/dL      Hematocrit 38.9 %      MCV 89.8 fL      MCH 29.8 pg      MCHC 33.2 g/dL      RDW 14.2 %      RDW-SD 46.0 fl      MPV 10.4 fL      Platelets 266 10*3/mm3      Neutrophil % 79.4 %      Lymphocyte % 14.2 %      Monocyte % 5.5 %      Eosinophil % 0.0 %      Basophil % 0.2 %      Immature Grans % 0.7 %      Neutrophils, Absolute 4.36 10*3/mm3      Lymphocytes, Absolute 0.78 10*3/mm3      Monocytes, Absolute 0.30 10*3/mm3      Eosinophils, Absolute 0.00 10*3/mm3      Basophils, Absolute 0.01 10*3/mm3      Immature Grans, Absolute 0.04 10*3/mm3      nRBC 0.0 /100 WBC     Troponin [[id number]]  (Normal) Collected: 12/02/20 2033    Specimen: Blood from Hand, Left Updated: 12/02/20 2106     Troponin T <0.010 ng/mL     Narrative:      Troponin T Reference Range:  <= 0.03  "ng/mL-   Negative for AMI  >0.03 ng/mL-     Abnormal for myocardial necrosis.  Clinicians would have to utilize clinical acumen, EKG, Troponin and serial changes to determine if it is an Acute Myocardial Infarction or myocardial injury due to an underlying chronic condition.       Results may be falsely decreased if patient taking Biotin.      Procalcitonin [[id number]]  (Abnormal) Collected: 12/02/20 1203    Specimen: Blood Updated: 12/02/20 1320     Procalcitonin 0.26 ng/mL     Narrative:      As a Marker for Sepsis (Non-Neonates):   1. <0.5 ng/mL represents a low risk of severe sepsis and/or septic shock.  1. >2 ng/mL represents a high risk of severe sepsis and/or septic shock.    As a Marker for Lower Respiratory Tract Infections that require antibiotic therapy:  PCT on Admission     Antibiotic Therapy             6-12 Hrs later  > 0.5                Strongly Recommended            >0.25 - <0.5         Recommended  0.1 - 0.25           Discouraged                   Remeasure/reassess PCT  <0.1                 Strongly Discouraged          Remeasure/reassess PCT      As 28 day mortality risk marker: \"Change in Procalcitonin Result\" (> 80 % or <=80 %) if Day 0 (or Day 1) and Day 4 values are available. Refer to http://www.[employer]-pct-calculator.com/   Change in PCT <=80 %   A decrease of PCT levels below or equal to 80 % defines a positive change in PCT test result representing a higher risk for 28-day all-cause mortality of patients diagnosed with severe sepsis or septic shock.  Change in PCT > 80 %   A decrease of PCT levels of more than 80 % defines a negative change in PCT result representing a lower risk for 28-day all-cause mortality of patients diagnosed with severe sepsis or septic shock.                Results may be falsely decreased if patient taking Biotin.     Ferritin [[id number]]  (Abnormal) Collected: 12/02/20 1203    Specimen: Blood Updated: 12/02/20 1317     Ferritin 436.00 ng/mL     Narrative:      " Results may be falsely decreased if patient taking Biotin.      Comprehensive Metabolic Panel [[id number]]  (Abnormal) Collected: 12/02/20 1203    Specimen: Blood Updated: 12/02/20 1314     Glucose 109 mg/dL      BUN 33 mg/dL      Creatinine 1.61 mg/dL      Sodium 134 mmol/L      Potassium 4.0 mmol/L      Chloride 99 mmol/L      CO2 18.3 mmol/L      Calcium 9.9 mg/dL      Total Protein 8.1 g/dL      Albumin 4.20 g/dL      ALT (SGPT) 12 U/L      AST (SGOT) 24 U/L      Alkaline Phosphatase 148 U/L      Total Bilirubin 0.5 mg/dL      eGFR Non African Amer 32 mL/min/1.73      Globulin 3.9 gm/dL      A/G Ratio 1.1 g/dL      BUN/Creatinine Ratio 20.5     Anion Gap 16.7 mmol/L     Narrative:      GFR Normal >60  Chronic Kidney Disease <60  Kidney Failure <15      Lactate Dehydrogenase [[id number]]  (Abnormal) Collected: 12/02/20 1203    Specimen: Blood Updated: 12/02/20 1314      U/L     C-reactive Protein [[id number]]  (Abnormal) Collected: 12/02/20 1203    Specimen: Blood Updated: 12/02/20 1314     C-Reactive Protein 28.19 mg/dL     Lactic Acid, Plasma [[id number]]  (Normal) Collected: 12/02/20 1202    Specimen: Blood Updated: 12/02/20 1310     Lactate 1.9 mmol/L     CBC & Differential [[id number]]  (Abnormal) Collected: 12/02/20 1202    Specimen: Blood Updated: 12/02/20 1243    Narrative:      The following orders were created for panel order CBC & Differential.  Procedure                               Abnormality         Status                     ---------                               -----------         ------                     CBC Auto Differential[[id number]]        Abnormal            Final result                 Please view results for these tests on the individual orders.    CBC Auto Differential [[id number]]  (Abnormal) Collected: 12/02/20 1202    Specimen: Blood Updated: 12/02/20 1243     WBC 12.09 10*3/mm3      RBC 5.45 10*6/mm3      Hemoglobin 16.1 g/dL      Hematocrit 48.5 %      MCV 89.0 fL      MCH  29.5 pg      MCHC 33.2 g/dL      RDW 14.4 %      RDW-SD 46.1 fl      MPV 10.3 fL      Platelets 325 10*3/mm3      Neutrophil % 81.3 %      Lymphocyte % 8.9 %      Monocyte % 9.0 %      Eosinophil % 0.0 %      Basophil % 0.2 %      Immature Grans % 0.6 %      Neutrophils, Absolute 9.82 10*3/mm3      Lymphocytes, Absolute 1.08 10*3/mm3      Monocytes, Absolute 1.09 10*3/mm3      Eosinophils, Absolute 0.00 10*3/mm3      Basophils, Absolute 0.03 10*3/mm3      Immature Grans, Absolute 0.07 10*3/mm3      nRBC 0.0 /100 WBC     Blood Gas, Arterial - [[id number]]  (Abnormal) Collected: 12/02/20 1236    Specimen: Arterial Blood Updated: 12/02/20 1242     Site Arterial: right radial     Kb's Test Positive     pH, Arterial 7.383 pH units      pCO2, Arterial 27.1 mm Hg      pO2, Arterial 79.5 mm Hg      HCO3, Arterial 16.2 mmol/L      Base Excess, Arterial -7.1 mmol/L      O2 Saturation Calculated 95.7 %      Barometric Pressure for Blood Gas 760.6 mmHg      Modality Cannula     Flow Rate 1 lpm      Set Mech Resp Rate 26          Imaging Results (All)     Procedure Component Value Units Date/Time    XR Chest AP [[id number]] Collected: 12/02/20 1301     Updated: 12/02/20 1304    Narrative:      CHEST SINGLE VIEW     HISTORY: Shortness of air, cough, COVID-19 positive.     COMPARISON: 2-view chest 12/22/2017.     FINDINGS: There is hazy peripheral predominant infiltrates within the  right mid to lower lung consistent with COVID-19 infiltrate. The heart  and mediastinal structures appear within normal limits. No perihilar  edema is evident. Aortic vascular calcifications are present.       Impression:      Hazy peripheral predominant infiltrates within the right mid  to lower lung consistent with COVID-19 infiltrates.     This report was finalized on 12/2/2020 1:01 PM by Dr. Flako Clements M.D.             Condition on Discharge:      Stable    Vital Signs:    /97 (BP Location: Left arm, Patient Position: Sitting)    "Pulse 65   Temp 97.3 °F (36.3 °C) (Oral)   Resp 18   Ht 167.6 cm (66\")   Wt 93.1 kg (205 lb 4 oz)   LMP  (LMP Unknown)   SpO2 95%   BMI 33.13 kg/m²     Physical Exam:    General: Alert and oriented x4, mild distress.  Heart: Regular rate and rhythm without murmur rub or thrill  Lungs: Rhonchi noted on the right.  No use of accessory muscles respiration.  Abdomen: Soft/nontender/nondistended.  No paraspinal megaly is noted.  MSK: 4/5 strength in upper/lower extremities bilaterally.    Discharge Disposition:    Home or Self Care    Discharge Medication:       Discharge Medications      New Medications      Instructions Start Date   albuterol sulfate  (90 Base) MCG/ACT inhaler  Commonly known as: PROVENTIL HFA;VENTOLIN HFA;PROAIR HFA   2 puffs, Inhalation, Every 2 Hours PRN      ascorbic acid 1000 MG tablet  Commonly known as: VITAMIN C   1,000 mg, Oral, Daily   Start Date: December 7, 2020     budesonide-formoterol 160-4.5 MCG/ACT inhaler  Commonly known as: SYMBICORT   2 puffs, Inhalation, 2 Times Daily - RT      dexamethasone 6 MG tablet  Commonly known as: DECADRON   6 mg, Oral, Daily With Breakfast   Start Date: December 7, 2020     HYDROcodone-acetaminophen 5-325 MG per tablet  Commonly known as: NORCO   1 tablet, Oral, Every 6 Hours PRN         Continue These Medications      Instructions Start Date   amLODIPine 5 MG tablet  Commonly known as: NORVASC   5 mg, Oral, Daily, For BP      Cholecalciferol 1.25 MG (52268 UT) tablet   One PO once weekly      folic acid 1 MG tablet  Commonly known as: FOLVITE   Take 1 tablet by mouth once daily      levothyroxine 25 MCG tablet  Commonly known as: SYNTHROID, LEVOTHROID   25 mcg, Oral, Daily, One PO daily before breakfast for thyroid.  Skip Sunday dose.      metoprolol succinate XL 25 MG 24 hr tablet  Commonly known as: TOPROL-XL   25 mg, Oral, Daily, For heart and BP      rosuvastatin 20 MG tablet  Commonly known as: CRESTOR   TAKE 1 TABLET BY MOUTH ONCE " DAILY FOR CHOLESTEROL      Vitamin B-12 1000 MCG sublingual tablet   DISSOLVE 1 LOZENGE BY MOUTH ONCE DAILY             Discharge Diet:     Diet Instructions     Diet: Regular, Cardiac      Discharge Diet:  Regular  Cardiac             Activity at Discharge:     Activity Instructions     Activity as Tolerated            Follow-up Appointments:    No future appointments.  Additional Instructions for the Follow-ups that You Need to Schedule     Discharge Follow-up with PCP   As directed       Currently Documented PCP:    Felicia Gloria PA-C    PCP Phone Number:    [phone]     Follow Up Details: 2 weeks               Test Results Pending at Discharge:    Pending Labs     Order Current Status    Blood Culture - Blood, Arm, Left Preliminary result    Blood Culture - Blood, Hand, Right Preliminary result             Seb Worley DO  12/06/20  12:02 EST    Electronically signed by Seb Worley DO at 12/06/20 1207

## 2020-12-07 NOTE — PROGRESS NOTES
Case Management Discharge Note      Final Note: Pt was dc'd home         Selected Continued Care - Discharged on 12/6/2020 Admission date: 12/2/2020 - Discharge disposition: Home or Self Care    Destination    No services have been selected for the patient.              Durable Medical Equipment    No services have been selected for the patient.              Dialysis/Infusion    No services have been selected for the patient.              Home Medical Care    No services have been selected for the patient.              Therapy    No services have been selected for the patient.              Community Resources    No services have been selected for the patient.                  Transportation Services  Private: Car    Final Discharge Disposition Code: 01 - home or self-care

## 2020-12-08 ENCOUNTER — TRANSITIONAL CARE MANAGEMENT TELEPHONE ENCOUNTER (OUTPATIENT)
Dept: CALL CENTER | Facility: HOSPITAL | Age: 70
End: 2020-12-08

## 2020-12-08 LAB
BACTERIA SPEC AEROBE CULT: NORMAL
BACTERIA SPEC AEROBE CULT: NORMAL

## 2020-12-08 NOTE — OUTREACH NOTE
Call Center TCM Note      Responses   [facility] patient discharged from?  [place]   Does the patient have one of the following disease processes/diagnoses(primary or secondary)?  COVID-19   COVID-19 underlying condition?  None   TCM attempt successful?  Yes   Discharge diagnosis  acute hypoxic resp failure & PNA d/t COVID-19, Hx chronic CHF   Meds reviewed with patient/caregiver?  Yes   Is the patient having any side effects they believe may be caused by any medication additions or changes?  No   Does the patient have all medications ordered at discharge?  Yes   Prescription comments  Only med not filled is Symbicort which per pt was $300. Pt does have all other prescribed inhalers, and other meds. She feels her breathing is improved enough she can do w/o that expense.    Is the patient taking all medications as directed (includes completed medication regime)?  Yes   Does the patient have a primary care provider?   Yes   Comments regarding PCP  Felicia Gloria PA-C   Does the patient have an appointment with their PCP or specialist within 7 days of discharge?  Yes   Has the patient kept scheduled appointments due by today?  Yes   Has home health visited the patient within 72 hours of discharge?  N/A   Psychosocial issues?  No   Did the patient receive a copy of their discharge instructions?  Yes   Did the patient receive a copy of COVID-19 specific instructions?  Yes   What is the patient's perception of their health status since discharge?  Improving   Does the patient have any of the following symptoms?  Shortness of breath   Pulse Ox monitoring  None   Is the patient/caregiver able to teach back steps to recovery at home?  Set small, achievable goals for return to baseline health, Practice good oral hygiene, Eat a well-balance diet, Rest and rebuild strength, gradually increase activity, Weigh daily, Make a list of questions for provider's appointment   If the patient is a current smoker, are they able to  teach back resources for cessation?  Not a smoker   Is the patient/caregiver able to teach back the hierarchy of who to call/visit for symptoms/problems? PCP, Specialist, Home health nurse, Urgent Care, ED, 911  Yes   TCM call completed?  Yes   Wrap up additional comments  Pt doing pretty well recovering from COVID PNEUMONIA. Pt states she is still fatigued and weak, but improving. Breathing is much better. Using inhalers as needed. All other meds in place. Pt will see PCP for TCM FWP by VV on 12/16/2020           Chaya Calloway MA    12/8/2020, 14:25 EST

## 2020-12-09 ENCOUNTER — READMISSION MANAGEMENT (OUTPATIENT)
Dept: CALL CENTER | Facility: HOSPITAL | Age: 70
End: 2020-12-09

## 2020-12-09 NOTE — OUTREACH NOTE
COVID-19 Week 1 Survey      Responses   [facility] patient discharged from?  [place]   Does the patient have one of the following disease processes/diagnoses(primary or secondary)?  COVID-19   COVID-19 underlying condition?  None   Call Number  Call 2   Week 1 Call successful?  Yes   Call start time  1011   Call end time  1028   Discharge diagnosis  acute hypoxic resp failure & PNA d/t COVID-19, Hx chronic CHF   Meds reviewed with patient/caregiver?  Yes   Is the patient taking all medications as directed (includes completed medication regime)?  Yes   Comments regarding PCP  Video visit on 12/16/20   Has the patient kept scheduled appointments due by today?  N/A   What is the patient's perception of their health status since discharge?  Improving   Does the patient have any of the following symptoms?  Shortness of breath, Cough, Loss of taste/smell [SOB with exertion, nonproductive cough after inhaler use, ]   Nursing Interventions  Nurse provided patient education   Pulse Ox monitoring  None   Is the patient/caregiver able to teach back steps to recovery at home?  Rest and rebuild strength, gradually increase activity, Eat a well-balance diet [Pt is not resting well]   COVID-19 call completed?  Yes          Jennifer Nur RN

## 2020-12-10 ENCOUNTER — READMISSION MANAGEMENT (OUTPATIENT)
Dept: CALL CENTER | Facility: HOSPITAL | Age: 70
End: 2020-12-10

## 2020-12-10 NOTE — OUTREACH NOTE
COVID-19 Week 1 Survey      Responses   [facility] patient discharged from?  [place]   Does the patient have one of the following disease processes/diagnoses(primary or secondary)?  COVID-19   COVID-19 underlying condition?  None   Call Number  Call 3   Week 1 Call successful?  Yes   Call start time  0933   Call end time  0935   Discharge diagnosis  acute hypoxic resp failure & PNA d/t COVID-19, Hx chronic CHF   What is the patient's perception of their health status since discharge?  Improving   Does the patient have any of the following symptoms?  Shortness of breath, Cough, Loss of taste/smell   Pulse Ox monitoring  None   COVID-19 call completed?  Yes   Wrap up additional comments  Improving.  She still has fatigue.  No problems or questions at this time.          Cely Turcios, RN

## 2020-12-15 ENCOUNTER — READMISSION MANAGEMENT (OUTPATIENT)
Dept: CALL CENTER | Facility: HOSPITAL | Age: 70
End: 2020-12-15

## 2020-12-15 NOTE — OUTREACH NOTE
COVID-19 Week 2 Survey      Responses   [facility] patient discharged from?  [place]   Does the patient have one of the following disease processes/diagnoses(primary or secondary)?  COVID-19   COVID-19 underlying condition?  None   Call Number  Call 1   COVID-19 Week 2: Call 1 attempt successful?  No   Discharge diagnosis  acute hypoxic resp failure & PNA d/t COVID-19, Hx chronic CHF          Matilde Betancourt RN

## 2020-12-16 ENCOUNTER — TELEMEDICINE (OUTPATIENT)
Dept: FAMILY MEDICINE CLINIC | Facility: CLINIC | Age: 70
End: 2020-12-16

## 2020-12-16 DIAGNOSIS — E53.8 LOW SERUM VITAMIN B12: ICD-10-CM

## 2020-12-16 DIAGNOSIS — J12.82 PNEUMONIA DUE TO COVID-19 VIRUS: ICD-10-CM

## 2020-12-16 DIAGNOSIS — Z09 HOSPITAL DISCHARGE FOLLOW-UP: Primary | ICD-10-CM

## 2020-12-16 DIAGNOSIS — R73.01 IMPAIRED FASTING GLUCOSE: ICD-10-CM

## 2020-12-16 DIAGNOSIS — I10 ESSENTIAL HYPERTENSION: ICD-10-CM

## 2020-12-16 DIAGNOSIS — E03.9 HYPOTHYROIDISM, ACQUIRED: ICD-10-CM

## 2020-12-16 DIAGNOSIS — E53.8 FOLIC ACID DEFICIENCY: ICD-10-CM

## 2020-12-16 DIAGNOSIS — U07.1 PNEUMONIA DUE TO COVID-19 VIRUS: ICD-10-CM

## 2020-12-16 DIAGNOSIS — I50.32 CHRONIC DIASTOLIC CHF (CONGESTIVE HEART FAILURE) (HCC): ICD-10-CM

## 2020-12-16 PROBLEM — J96.01 ACUTE RESPIRATORY FAILURE WITH HYPOXIA (HCC): Status: RESOLVED | Noted: 2020-12-02 | Resolved: 2020-12-16

## 2020-12-16 PROCEDURE — 99442 PR PHYS/QHP TELEPHONE EVALUATION 11-20 MIN: CPT | Performed by: PHYSICIAN ASSISTANT

## 2020-12-16 NOTE — PROGRESS NOTES
Transitional Care Follow Up Visit  Subjective     Leonard Best is a 70 y.o. female who presents for a transitional care management visit.    Within 48 business hours after discharge our office contacted her via telephone to coordinate her care and needs.      I reviewed and discussed the details of that call along with the discharge summary, hospital problems, inpatient lab results, inpatient diagnostic studies, and consultation reports with Leonard.     Current outpatient and discharge medications have been reconciled for the patient.  Reviewed by: Felicia Gloria PA-C      Date of TCM Phone Call 12/6/2020   [facility]   Date of Admission 12/2/2020   Date of Discharge 12/6/2020   Discharge Disposition Home or Self Care     Risk for Readmission (LACE) Score: 11 (12/6/2020  6:00 AM)      History of Present Illness   Course During Hospital Stay:  12-2 to 12-6-20    Hospital Course:     Patient is a 70-year-old female with history of paroxysmal SVT who comes in with shortness of breath, fatigue, nausea, vomiting, diarrhea.  This has been going on since Saturday prior to admission she states.  She is noted to have hypoxia.  Chest x-ray did show right-sided pneumonia.  Patient was admitted and placed on Decadron as well as remdesivir.  Patient has increased her fluid intake, her creatinine is improved.     Patient is on day 5/5 of remdesivir.    She is now off oxygen. Her inflammatory markers are improved.  She states that if she is shaky from being on Decadron.  Visor that she needs to continue this for 5 more days. She denies any chest pressure, nausea, vomiting, pain. She is eating better.     She is stable to be discharged home.  She denies any new problems.  She is to return to the hospital if she worsens.  Advised her to self quarantine for 10 days from first symptom for 24 hours without fever.     Follow-up with PCP in 2 weeks.  Follow-up chest x-ray in 1 month     Want her to f/u with her cardiologist,  Dr Woods    I do want her to f/u nephrologist---to see Dr. Saldivar    Has not needed Albuterol MDI------did not fill Symbicort  No hx asthma.  Overall much better since d/c. Energy level still low; tired; no more wheezing.  SOA with exertion now and rest helps.  No loss taste or smell. Overall ok.  Will need regular labs March; CXR early Jan  Still some cough;  She finish the Decadron   The following portions of the patient's history were reviewed and updated as appropriate: allergies, current medications, past family history, past medical history, past social history, past surgical history and problem list.    Review of Systems   Constitutional: Positive for activity change, appetite change and fatigue. Negative for unexpected weight change.   HENT: Negative for nosebleeds and trouble swallowing.    Eyes: Negative for pain and visual disturbance.   Respiratory: Positive for cough and shortness of breath. Negative for chest tightness and wheezing.    Cardiovascular: Negative for chest pain and palpitations.   Gastrointestinal: Negative for abdominal pain, blood in stool and diarrhea.   Endocrine: Negative.    Genitourinary: Negative for difficulty urinating and hematuria.   Musculoskeletal: Negative for joint swelling.   Skin: Negative for color change and rash.   Allergic/Immunologic: Negative.    Neurological: Negative for syncope and speech difficulty.   Hematological: Negative for adenopathy.   Psychiatric/Behavioral: Negative for agitation and confusion.   All other systems reviewed and are negative.      Objective   Physical Exam  Eyes:      General: No scleral icterus.  Neurological:      Mental Status: She is alert and oriented to person, place, and time.   Psychiatric:         Mood and Affect: Mood normal.         Behavior: Behavior normal.         Thought Content: Thought content normal.         Judgment: Judgment normal.         Assessment/Plan   Diagnoses and all orders for this visit:    1. [facility]  discharge follow-up (Primary)  -     XR Chest PA & Lateral; Future    2. Pneumonia due to COVID-19 virus  -     XR Chest PA & Lateral; Future    3. Chronic diastolic CHF (congestive heart failure) (CMS/[facility])  -     Comprehensive metabolic panel; Future  -     Lipid panel; Future  -     CBC and Differential; Future  -     TSH; Future  -     T4, Free; Future  -     T3, Free; Future  -     Vitamin D 25 Hydroxy; Future  -     Vitamin B12; Future  -     Folate; Future  -     Hemoglobin A1c; Future    4. Essential hypertension  -     Comprehensive metabolic panel; Future  -     Lipid panel; Future  -     CBC and Differential; Future  -     TSH; Future  -     T4, Free; Future  -     T3, Free; Future  -     Vitamin D 25 Hydroxy; Future  -     Vitamin B12; Future  -     Folate; Future  -     Hemoglobin A1c; Future    5. Low serum vitamin B12  -     Comprehensive metabolic panel; Future  -     Lipid panel; Future  -     CBC and Differential; Future  -     TSH; Future  -     T4, Free; Future  -     T3, Free; Future  -     Vitamin D 25 Hydroxy; Future  -     Vitamin B12; Future  -     Folate; Future  -     Hemoglobin A1c; Future    6. Folic acid deficiency  -     Comprehensive metabolic panel; Future  -     Lipid panel; Future  -     CBC and Differential; Future  -     TSH; Future  -     T4, Free; Future  -     T3, Free; Future  -     Vitamin D 25 Hydroxy; Future  -     Vitamin B12; Future  -     Folate; Future  -     Hemoglobin A1c; Future    7. Hypothyroidism, acquired  -     Comprehensive metabolic panel; Future  -     Lipid panel; Future  -     CBC and Differential; Future  -     TSH; Future  -     T4, Free; Future  -     T3, Free; Future  -     Vitamin D 25 Hydroxy; Future  -     Vitamin B12; Future  -     Folate; Future  -     Hemoglobin A1c; Future    8. Impaired fasting glucose  -     Comprehensive metabolic panel; Future  -     Lipid panel; Future  -     CBC and Differential; Future  -     TSH; Future  -     T4, Free;  Future  -     T3, Free; Future  -     Vitamin D 25 Hydroxy; Future  -     Vitamin B12; Future  -     Folate; Future  -     Hemoglobin A1c; Future      Will need f/u CXR--1-7-21 or after  Labs due March  Has appt with Nephrologist for CKD  Could not connect by video  Time spent 16 minutes  Doing well post hosp

## 2020-12-16 NOTE — PATIENT INSTRUCTIONS
COVID-19  COVID-19 is a respiratory infection that is caused by a virus called severe acute respiratory syndrome coronavirus 2 (SARS-CoV-2). The disease is also known as coronavirus disease or novel coronavirus. In some people, the virus may not cause any symptoms. In others, it may cause a serious infection. The infection can get worse quickly and can lead to complications, such as:  · Pneumonia, or infection of the lungs.  · Acute respiratory distress syndrome or ARDS. This is a condition in which fluid build-up in the lungs prevents the lungs from filling with air and passing oxygen into the blood.  · Acute respiratory failure. This is a condition in which there is not enough oxygen passing from the lungs to the body or when carbon dioxide is not passing from the lungs out of the body.  · Sepsis or septic shock. This is a serious bodily reaction to an infection.  · Blood clotting problems.  · Secondary infections due to bacteria or fungus.  · Organ failure. This is when your body's organs stop working.  The virus that causes COVID-19 is contagious. This means that it can spread from person to person through droplets from coughs and sneezes (respiratory secretions).  What are the causes?  This illness is caused by a virus. You may catch the virus by:  · Breathing in droplets from an infected person. Droplets can be spread by a person breathing, speaking, singing, coughing, or sneezing.  · Touching something, like a table or a doorknob, that was exposed to the virus (contaminated) and then touching your mouth, nose, or eyes.  What increases the risk?  Risk for infection  You are more likely to be infected with this virus if you:  · Are within 6 feet (2 meters) of a person with COVID-19.  · Provide care for or live with a person who is infected with COVID-19.  · Spend time in crowded indoor spaces or live in shared housing.  Risk for serious illness  You are more likely to become seriously ill from the virus if  "you:  · Are 50 years of age or older. The higher your age, the more you are at risk for serious illness.  · Live in a nursing home or long-term care facility.  · Have cancer.  · Have a long-term (chronic) disease such as:  ? Chronic lung disease, including chronic obstructive pulmonary disease or asthma.  ? A long-term disease that lowers your body's ability to fight infection (immunocompromised).  ? Heart disease, including heart failure, a condition in which the arteries that lead to the heart become narrow or blocked (coronary artery disease), a disease which makes the heart muscle thick, weak, or stiff (cardiomyopathy).  ? Diabetes.  ? Chronic kidney disease.  ? Sickle cell disease, a condition in which red blood cells have an abnormal \"sickle\" shape.  ? Liver disease.  · Are obese.  What are the signs or symptoms?  Symptoms of this condition can range from mild to severe. Symptoms may appear any time from 2 to 14 days after being exposed to the virus. They include:  · A fever or chills.  · A cough.  · Difficulty breathing.  · Headaches, body aches, or muscle aches.  · Runny or stuffy (congested) nose.  · A sore throat.  · New loss of taste or smell.  Some people may also have stomach problems, such as nausea, vomiting, or diarrhea.  Other people may not have any symptoms of COVID-19.  How is this diagnosed?  This condition may be diagnosed based on:  · Your signs and symptoms, especially if:  ? You live in an area with a COVID-19 outbreak.  ? You recently traveled to or from an area where the virus is common.  ? You provide care for or live with a person who was diagnosed with COVID-19.  ? You were exposed to a person who was diagnosed with COVID-19.  · A physical exam.  · Lab tests, which may include:  ? Taking a sample of fluid from the back of your nose and throat (nasopharyngeal fluid), your nose, or your throat using a swab.  ? A sample of mucus from your lungs (sputum).  ? Blood tests.  · Imaging tests, " which may include, X-rays, CT scan, or ultrasound.  How is this treated?  At present, there is no medicine to treat COVID-19. Medicines that treat other diseases are being used on a trial basis to see if they are effective against COVID-19.  Your health care provider will talk with you about ways to treat your symptoms. For most people, the infection is mild and can be managed at home with rest, fluids, and over-the-counter medicines.  Treatment for a serious infection usually takes places in a hospital intensive care unit (ICU). It may include one or more of the following treatments. These treatments are given until your symptoms improve.  · Receiving fluids and medicines through an IV.  · Supplemental oxygen. Extra oxygen is given through a tube in the nose, a face mask, or a fernandez.  · Positioning you to lie on your stomach (prone position). This makes it easier for oxygen to get into the lungs.  · Continuous positive airway pressure (CPAP) or bi-level positive airway pressure (BPAP) machine. This treatment uses mild air pressure to keep the airways open. A tube that is connected to a motor delivers oxygen to the body.  · Ventilator. This treatment moves air into and out of the lungs by using a tube that is placed in your windpipe.  · Tracheostomy. This is a procedure to create a hole in the neck so that a breathing tube can be inserted.  · Extracorporeal membrane oxygenation (ECMO). This procedure gives the lungs a chance to recover by taking over the functions of the heart and lungs. It supplies oxygen to the body and removes carbon dioxide.  Follow these instructions at home:  Lifestyle  · If you are sick, stay home except to get medical care. Your health care provider will tell you how long to stay home. Call your health care provider before you go for medical care.  · Rest at home as told by your health care provider.  · Do not use any products that contain nicotine or tobacco, such as cigarettes,  e-cigarettes, and chewing tobacco. If you need help quitting, ask your health care provider.  · Return to your normal activities as told by your health care provider. Ask your health care provider what activities are safe for you.  General instructions  · Take over-the-counter and prescription medicines only as told by your health care provider.  · Drink enough fluid to keep your urine pale yellow.  · Keep all follow-up visits as told by your health care provider. This is important.  How is this prevented?    There is no vaccine to help prevent COVID-19 infection. However, there are steps you can take to protect yourself and others from this virus.  To protect yourself:   · Do not travel to areas where COVID-19 is a risk. The areas where COVID-19 is reported change often. To identify high-risk areas and travel restrictions, check the CDC travel website: wwwnc.cdc.gov/travel/notices  · If you live in, or must travel to, an area where COVID-19 is a risk, take precautions to avoid infection.  ? Stay away from people who are sick.  ? Wash your hands often with soap and water for 20 seconds. If soap and water are not available, use an alcohol-based hand .  ? Avoid touching your mouth, face, eyes, or nose.  ? Avoid going out in public, follow guidance from your state and local health authorities.  ? If you must go out in public, wear a cloth face covering or face mask. Make sure your mask covers your nose and mouth.  ? Avoid crowded indoor spaces. Stay at least 6 feet (2 meters) away from others.  ? Disinfect objects and surfaces that are frequently touched every day. This may include:  § Counters and tables.  § Doorknobs and light switches.  § Sinks and faucets.  § Electronics, such as phones, remote controls, keyboards, computers, and tablets.  To protect others:  If you have symptoms of COVID-19, take steps to prevent the virus from spreading to others.  · If you think you have a COVID-19 infection, contact  your health care provider right away. Tell your health care team that you think you may have a COVID-19 infection.  · Stay home. Leave your house only to seek medical care. Do not use public transport.  · Do not travel while you are sick.  · Wash your hands often with soap and water for 20 seconds. If soap and water are not available, use alcohol-based hand .  · Stay away from other members of your household. Let healthy household members care for children and pets, if possible. If you have to care for children or pets, wash your hands often and wear a mask. If possible, stay in your own room, separate from others. Use a different bathroom.  · Make sure that all people in your household wash their hands well and often.  · Cough or sneeze into a tissue or your sleeve or elbow. Do not cough or sneeze into your hand or into the air.  · Wear a cloth face covering or face mask. Make sure your mask covers your nose and mouth.  Where to find more information  · Centers for Disease Control and Prevention: www.cdc.gov/coronavirus/2019-ncov/index.html  · World Health Organization: www.who.int/health-topics/coronavirus  Contact a health care provider if:  · You live in or have traveled to an area where COVID-19 is a risk and you have symptoms of the infection.  · You have had contact with someone who has COVID-19 and you have symptoms of the infection.  Get help right away if:  · You have trouble breathing.  · You have pain or pressure in your chest.  · You have confusion.  · You have bluish lips and fingernails.  · You have difficulty waking from sleep.  · You have symptoms that get worse.  These symptoms may represent a serious problem that is an emergency. Do not wait to see if the symptoms will go away. Get medical help right away. Call your local emergency services (911 in the [place] Do not drive yourself to the hospital. Let the emergency medical personnel know if you think you have  COVID-19.  Summary  · COVID-19 is a respiratory infection that is caused by a virus. It is also known as coronavirus disease or novel coronavirus. It can cause serious infections, such as pneumonia, acute respiratory distress syndrome, acute respiratory failure, or sepsis.  · The virus that causes COVID-19 is contagious. This means that it can spread from person to person through droplets from breathing, speaking, singing, coughing, or sneezing.  · You are more likely to develop a serious illness if you are 50 years of age or older, have a weak immune system, live in a nursing home, or have chronic disease.  · There is no medicine to treat COVID-19. Your health care provider will talk with you about ways to treat your symptoms.  · Take steps to protect yourself and others from infection. Wash your hands often and disinfect objects and surfaces that are frequently touched every day. Stay away from people who are sick and wear a mask if you are sick.  This information is not intended to replace advice given to you by your health care provider. Make sure you discuss any questions you have with your health care provider.  Document Revised: 10/16/2020 Document Reviewed: 01/23/2020  Elsevier Patient Education © 2020 [employer]

## 2020-12-24 ENCOUNTER — READMISSION MANAGEMENT (OUTPATIENT)
Dept: CALL CENTER | Facility: HOSPITAL | Age: 70
End: 2020-12-24

## 2020-12-24 NOTE — OUTREACH NOTE
COVID-19 Week 3 Survey      Responses   [facility] patient discharged from?  [place]   Does the patient have one of the following disease processes/diagnoses(primary or secondary)?  COVID-19   COVID-19 underlying condition?  None   Call Number  Call 1   COVID-19 Week 3: Call 1 attempt successful?  Yes   Call start time  1029   Call end time  1031   Discharge diagnosis  acute hypoxic resp failure & PNA d/t COVID-19, Hx chronic CHF   Meds reviewed with patient/caregiver?  Yes   Is the patient taking all medications as directed (includes completed medication regime)?  Yes   Has the patient kept scheduled appointments due by today?  Yes   What is the patient's perception of their health status since discharge?  Improving   Does the patient have any of the following symptoms?  Cough, Shortness of breath [SOB with exertion]   Nursing Interventions  Nurse provided patient education   Pulse Ox monitoring  None   Is the patient/caregiver able to teach back steps to recovery at home?  Rest and rebuild strength, gradually increase activity, Eat a well-balance diet   COVID-19 call completed?  Yes          Jennifer Nur RN

## 2020-12-31 ENCOUNTER — READMISSION MANAGEMENT (OUTPATIENT)
Dept: CALL CENTER | Facility: HOSPITAL | Age: 70
End: 2020-12-31

## 2020-12-31 NOTE — OUTREACH NOTE
COVID-19 Week 4 Survey      Responses   [facility] patient discharged from?  [place]   Does the patient have one of the following disease processes/diagnoses(primary or secondary)?  COVID-19   COVID-19 underlying condition?  None   Call Number  Call 1   COVID-19 Week 4: Call 1 attempt successful?  Yes   Call start time  1032   Call end time  1036   Discharge diagnosis  acute hypoxic resp failure & PNA d/t COVID-19, Hx chronic CHF   Is patient permission given to speak with other caregiver?  No   Meds reviewed with patient/caregiver?  Yes   Is the patient taking all medications as directed (includes completed medication regime)?  Yes   Has the patient kept scheduled appointments due by today?  Yes   Is the patient still receiving Home Health Services?  N/A   What is the patient's perception of their health status since discharge?  Improving   Does the patient have any of the following symptoms?  Cough, Shortness of breath [Reports still some shortness of breath with activity/exertion. ]   Nursing Interventions  Nurse provided patient education   Pulse Ox monitoring  None   Is the patient/caregiver able to teach back steps to recovery at home?  Set small, achievable goals for return to baseline health, Rest and rebuild strength, gradually increase activity   If the patient is a current smoker, are they able to teach back resources for cessation?  Not a smoker   Is the patient/caregiver able to teach back the hierarchy of who to call/visit for symptoms/problems? PCP, Specialist, Home health nurse, Urgent Care, ED, 911  Yes   Is the patient interested in additional calls from an ambulatory ?  NOTE:  applies to high risk patients requiring additional follow-up.  No   Did the patient feel the follow up calls were helpful during their recovery period?  Yes   Was the number of calls appropriate?  Yes   Interested in COVID-19 Plasma Donation?  Uncertain   Wrap up additional comments  Patient denies any  new questions or concerns today.           Chaya Toussaint RN

## 2021-01-11 RX ORDER — ROSUVASTATIN CALCIUM 20 MG/1
TABLET, COATED ORAL
Qty: 30 TABLET | Refills: 0 | Status: SHIPPED | OUTPATIENT
Start: 2021-01-11 | End: 2021-03-04

## 2021-01-21 RX ORDER — FOLIC ACID 1 MG/1
TABLET ORAL
Qty: 90 TABLET | Refills: 0 | Status: SHIPPED | OUTPATIENT
Start: 2021-01-21 | End: 2021-06-07

## 2021-01-21 RX ORDER — AMLODIPINE BESYLATE 5 MG/1
TABLET ORAL
Qty: 90 TABLET | Refills: 0 | Status: SHIPPED | OUTPATIENT
Start: 2021-01-21 | End: 2021-05-10 | Stop reason: SDUPTHER

## 2021-02-27 RX ORDER — METOPROLOL SUCCINATE 25 MG/1
TABLET, EXTENDED RELEASE ORAL
Qty: 30 TABLET | Refills: 0 | Status: SHIPPED | OUTPATIENT
Start: 2021-02-27 | End: 2021-04-06

## 2021-03-04 RX ORDER — ROSUVASTATIN CALCIUM 20 MG/1
TABLET, COATED ORAL
Qty: 30 TABLET | Refills: 0 | Status: SHIPPED | OUTPATIENT
Start: 2021-03-04 | End: 2021-04-13

## 2021-03-04 RX ORDER — MAGNESIUM 200 MG
TABLET ORAL
Qty: 30 EACH | Refills: 0 | Status: SHIPPED | OUTPATIENT
Start: 2021-03-04 | End: 2021-05-17

## 2021-04-01 RX ORDER — LEVOTHYROXINE SODIUM 25 UG/1
TABLET ORAL
Qty: 30 TABLET | Refills: 0 | Status: SHIPPED | OUTPATIENT
Start: 2021-04-01 | End: 2021-05-10

## 2021-04-06 RX ORDER — METOPROLOL SUCCINATE 25 MG/1
TABLET, EXTENDED RELEASE ORAL
Qty: 30 TABLET | Refills: 0 | Status: SHIPPED | OUTPATIENT
Start: 2021-04-06 | End: 2021-05-10

## 2021-04-13 RX ORDER — ROSUVASTATIN CALCIUM 20 MG/1
TABLET, COATED ORAL
Qty: 30 TABLET | Refills: 0 | Status: SHIPPED | OUTPATIENT
Start: 2021-04-13 | End: 2021-05-27

## 2021-04-13 RX ORDER — VITAMIN E (DL,TOCOPHERYL ACET) 180 MG
CAPSULE ORAL
Qty: 30 TABLET | Refills: 0 | Status: SHIPPED | OUTPATIENT
Start: 2021-04-13 | End: 2021-05-17

## 2021-05-10 RX ORDER — METOPROLOL SUCCINATE 25 MG/1
TABLET, EXTENDED RELEASE ORAL
Qty: 30 TABLET | Refills: 0 | Status: SHIPPED | OUTPATIENT
Start: 2021-05-10 | End: 2021-06-07

## 2021-05-10 RX ORDER — AMLODIPINE BESYLATE 5 MG/1
5 TABLET ORAL DAILY
Qty: 90 TABLET | Refills: 0 | Status: SHIPPED | OUTPATIENT
Start: 2021-05-10 | End: 2021-11-02 | Stop reason: SDUPTHER

## 2021-05-10 RX ORDER — LEVOTHYROXINE SODIUM 25 UG/1
TABLET ORAL
Qty: 30 TABLET | Refills: 0 | Status: SHIPPED | OUTPATIENT
Start: 2021-05-10 | End: 2021-06-07

## 2021-05-10 RX ORDER — CHOLECALCIFEROL (VITAMIN D3) 1250 MCG
CAPSULE ORAL
Qty: 5 CAPSULE | Refills: 0 | Status: SHIPPED | OUTPATIENT
Start: 2021-05-10

## 2021-05-17 RX ORDER — ROSUVASTATIN CALCIUM 20 MG/1
TABLET, COATED ORAL
Qty: 30 TABLET | Refills: 0 | OUTPATIENT
Start: 2021-05-17

## 2021-05-17 RX ORDER — VITAMIN E (DL,TOCOPHERYL ACET) 180 MG
CAPSULE ORAL
Qty: 30 TABLET | Refills: 0 | Status: SHIPPED | OUTPATIENT
Start: 2021-05-17 | End: 2021-05-27

## 2021-05-27 RX ORDER — VITAMIN E (DL,TOCOPHERYL ACET) 180 MG
CAPSULE ORAL
Qty: 30 TABLET | Refills: 0 | Status: SHIPPED | OUTPATIENT
Start: 2021-05-27 | End: 2021-11-02

## 2021-05-27 RX ORDER — ROSUVASTATIN CALCIUM 20 MG/1
TABLET, COATED ORAL
Qty: 30 TABLET | Refills: 0 | Status: SHIPPED | OUTPATIENT
Start: 2021-05-27 | End: 2021-11-02 | Stop reason: SDUPTHER

## 2021-06-07 RX ORDER — LEVOTHYROXINE SODIUM 25 UG/1
TABLET ORAL
Qty: 30 TABLET | Refills: 0 | Status: SHIPPED | OUTPATIENT
Start: 2021-06-07 | End: 2021-08-10

## 2021-06-07 RX ORDER — METOPROLOL SUCCINATE 25 MG/1
TABLET, EXTENDED RELEASE ORAL
Qty: 30 TABLET | Refills: 0 | Status: SHIPPED | OUTPATIENT
Start: 2021-06-07 | End: 2021-10-18 | Stop reason: SDUPTHER

## 2021-06-07 RX ORDER — FOLIC ACID 1 MG/1
TABLET ORAL
Qty: 90 TABLET | Refills: 0 | Status: SHIPPED | OUTPATIENT
Start: 2021-06-07 | End: 2021-11-02 | Stop reason: SDUPTHER

## 2021-08-10 RX ORDER — LEVOTHYROXINE SODIUM 25 UG/1
TABLET ORAL
Qty: 7 TABLET | Refills: 0 | Status: SHIPPED | OUTPATIENT
Start: 2021-08-10 | End: 2021-11-02 | Stop reason: SDUPTHER

## 2021-10-18 ENCOUNTER — TELEPHONE (OUTPATIENT)
Dept: FAMILY MEDICINE CLINIC | Facility: CLINIC | Age: 71
End: 2021-10-18

## 2021-10-18 RX ORDER — METOPROLOL SUCCINATE 25 MG/1
25 TABLET, EXTENDED RELEASE ORAL DAILY
Qty: 30 TABLET | Refills: 0 | Status: SHIPPED | OUTPATIENT
Start: 2021-10-18 | End: 2021-11-02 | Stop reason: SDUPTHER

## 2021-10-18 NOTE — TELEPHONE ENCOUNTER
Caller: Leonard Best    Relationship: Self      Medication requested (name and dosage)metoprolol succinate XL (TOPROL-XL) 25 MG 24 hr tablet    Pharmacy where request should be sent: [address] [place] [address] - [phone] [facility] [phone] [facility]          Best call back number: [phone]  Does the patient have less than a 3 day supply:  [x] Yes  [] No    Elroy Blankenship Rep   10/18/21 14:33 EDT

## 2021-11-02 ENCOUNTER — OFFICE VISIT (OUTPATIENT)
Dept: FAMILY MEDICINE CLINIC | Facility: CLINIC | Age: 71
End: 2021-11-02

## 2021-11-02 VITALS
RESPIRATION RATE: 16 BRPM | WEIGHT: 178 LBS | HEART RATE: 68 BPM | SYSTOLIC BLOOD PRESSURE: 148 MMHG | OXYGEN SATURATION: 96 % | TEMPERATURE: 97.5 F | BODY MASS INDEX: 28.61 KG/M2 | HEIGHT: 66 IN | DIASTOLIC BLOOD PRESSURE: 94 MMHG

## 2021-11-02 DIAGNOSIS — E55.9 VITAMIN D DEFICIENCY: ICD-10-CM

## 2021-11-02 DIAGNOSIS — E78.2 MIXED HYPERLIPIDEMIA: ICD-10-CM

## 2021-11-02 DIAGNOSIS — E53.8 FOLIC ACID DEFICIENCY: ICD-10-CM

## 2021-11-02 DIAGNOSIS — M15.9 PRIMARY OSTEOARTHRITIS INVOLVING MULTIPLE JOINTS: ICD-10-CM

## 2021-11-02 DIAGNOSIS — I47.1 PSVT (PAROXYSMAL SUPRAVENTRICULAR TACHYCARDIA) (HCC): ICD-10-CM

## 2021-11-02 DIAGNOSIS — N18.4 CKD (CHRONIC KIDNEY DISEASE) STAGE 4, GFR 15-29 ML/MIN (HCC): ICD-10-CM

## 2021-11-02 DIAGNOSIS — E03.9 HYPOTHYROIDISM, ACQUIRED: ICD-10-CM

## 2021-11-02 DIAGNOSIS — I10 PRIMARY HYPERTENSION: Primary | ICD-10-CM

## 2021-11-02 DIAGNOSIS — R73.01 IMPAIRED FASTING GLUCOSE: ICD-10-CM

## 2021-11-02 DIAGNOSIS — E53.8 LOW SERUM VITAMIN B12: ICD-10-CM

## 2021-11-02 PROCEDURE — 99214 OFFICE O/P EST MOD 30 MIN: CPT | Performed by: PHYSICIAN ASSISTANT

## 2021-11-02 RX ORDER — LEVOTHYROXINE SODIUM 0.03 MG/1
25 TABLET ORAL DAILY
Qty: 90 TABLET | Refills: 1 | Status: SHIPPED | OUTPATIENT
Start: 2021-11-02 | End: 2022-10-05 | Stop reason: SDUPTHER

## 2021-11-02 RX ORDER — AMLODIPINE BESYLATE 5 MG/1
5 TABLET ORAL DAILY
Qty: 90 TABLET | Refills: 1 | Status: SHIPPED | OUTPATIENT
Start: 2021-11-02 | End: 2022-10-05 | Stop reason: SDUPTHER

## 2021-11-02 RX ORDER — ROSUVASTATIN CALCIUM 20 MG/1
20 TABLET, COATED ORAL DAILY
Qty: 90 TABLET | Refills: 3 | Status: SHIPPED | OUTPATIENT
Start: 2021-11-02 | End: 2022-10-05 | Stop reason: SDUPTHER

## 2021-11-02 RX ORDER — METOPROLOL SUCCINATE 25 MG/1
25 TABLET, EXTENDED RELEASE ORAL DAILY
Qty: 90 TABLET | Refills: 3 | Status: SHIPPED | OUTPATIENT
Start: 2021-11-02 | End: 2021-11-19

## 2021-11-02 RX ORDER — ALBUTEROL SULFATE 90 UG/1
2 AEROSOL, METERED RESPIRATORY (INHALATION)
Qty: 18 G | Refills: 11 | Status: SHIPPED | OUTPATIENT
Start: 2021-11-02

## 2021-11-02 RX ORDER — FOLIC ACID 1 MG/1
1000 TABLET ORAL DAILY
Qty: 90 TABLET | Refills: 3 | Status: SHIPPED | OUTPATIENT
Start: 2021-11-02

## 2021-11-02 NOTE — PROGRESS NOTES
"Subjective   Leonard Best is a 71 y.o. female.     History of Present Illness    Since the last visit, she has overall felt fairly well.  She has Impaired fasting glucose and will monitor labs to watch for DMII, Hypothyroidism and must update labs to continue treatment, Vitamin D deficiency and will update labs for continued management and Essential hypertension with blood pressure not at goal the patient has whitecoat syndrome we will get readings at home and email me, mixed hyperlipidemia need labs to make sure LDL less than 100.  she has been compliant with current medications have reviewed them.  The patient denies medication side effects.  Will refill medications. /94   Pulse 68   Temp 97.5 °F (36.4 °C)   Resp 16   Ht 167.6 cm (65.98\")   Wt 80.7 kg (178 lb)   LMP  (LMP Unknown)   SpO2 96%   BMI 28.74 kg/m²     Results for orders placed or performed during the hospital encounter of 12/02/20   Blood Culture - Blood, Arm, Left    Specimen: Arm, Left; Blood   Result Value Ref Range    Blood Culture No growth at 5 days    Blood Culture - Blood, Hand, Right    Specimen: Hand, Right; Blood   Result Value Ref Range    Blood Culture No growth at 5 days    Comprehensive Metabolic Panel    Specimen: Blood   Result Value Ref Range    Glucose 109 (H) 65 - 99 mg/dL    BUN 33 (H) 8 - 23 mg/dL    Creatinine 1.61 (H) 0.57 - 1.00 mg/dL    Sodium 134 (L) 136 - 145 mmol/L    Potassium 4.0 3.5 - 5.2 mmol/L    Chloride 99 98 - 107 mmol/L    CO2 18.3 (L) 22.0 - 29.0 mmol/L    Calcium 9.9 8.6 - 10.5 mg/dL    Total Protein 8.1 6.0 - 8.5 g/dL    Albumin 4.20 3.50 - 5.20 g/dL    ALT (SGPT) 12 1 - 33 U/L    AST (SGOT) 24 1 - 32 U/L    Alkaline Phosphatase 148 (H) 39 - 117 U/L    Total Bilirubin 0.5 0.0 - 1.2 mg/dL    eGFR Non African Amer 32 (L) >60 mL/min/1.73    Globulin 3.9 gm/dL    A/G Ratio 1.1 g/dL    BUN/Creatinine Ratio 20.5 7.0 - 25.0    Anion Gap 16.7 (H) 5.0 - 15.0 mmol/L   Lactate Dehydrogenase    Specimen: " Blood   Result Value Ref Range     (H) 135 - 214 U/L   Procalcitonin    Specimen: Blood   Result Value Ref Range    Procalcitonin 0.26 (H) 0.00 - 0.25 ng/mL   C-reactive Protein    Specimen: Blood   Result Value Ref Range    C-Reactive Protein 28.19 (H) 0.00 - 0.50 mg/dL   Lactic Acid, Plasma    Specimen: Blood   Result Value Ref Range    Lactate 1.9 0.5 - 2.0 mmol/L   Ferritin    Specimen: Blood   Result Value Ref Range    Ferritin 436.00 (H) 13.00 - 150.00 ng/mL   CBC Auto Differential    Specimen: Blood   Result Value Ref Range    WBC 12.09 (H) 3.40 - 10.80 10*3/mm3    RBC 5.45 (H) 3.77 - 5.28 10*6/mm3    Hemoglobin 16.1 (H) 12.0 - 15.9 g/dL    Hematocrit 48.5 (H) 34.0 - 46.6 %    MCV 89.0 79.0 - 97.0 fL    MCH 29.5 26.6 - 33.0 pg    MCHC 33.2 31.5 - 35.7 g/dL    RDW 14.4 12.3 - 15.4 %    RDW-SD 46.1 37.0 - 54.0 fl    MPV 10.3 6.0 - 12.0 fL    Platelets 325 140 - 450 10*3/mm3    Neutrophil % 81.3 (H) 42.7 - 76.0 %    Lymphocyte % 8.9 (L) 19.6 - 45.3 %    Monocyte % 9.0 5.0 - 12.0 %    Eosinophil % 0.0 (L) 0.3 - 6.2 %    Basophil % 0.2 0.0 - 1.5 %    Immature Grans % 0.6 (H) 0.0 - 0.5 %    Neutrophils, Absolute 9.82 (H) 1.70 - 7.00 10*3/mm3    Lymphocytes, Absolute 1.08 0.70 - 3.10 10*3/mm3    Monocytes, Absolute 1.09 (H) 0.10 - 0.90 10*3/mm3    Eosinophils, Absolute 0.00 0.00 - 0.40 10*3/mm3    Basophils, Absolute 0.03 0.00 - 0.20 10*3/mm3    Immature Grans, Absolute 0.07 (H) 0.00 - 0.05 10*3/mm3    nRBC 0.0 0.0 - 0.2 /100 WBC   Blood Gas, Arterial -    Specimen: Arterial Blood   Result Value Ref Range    Site Arterial: right radial     Kb's Test Positive     pH, Arterial 7.383 7.350 - 7.450 pH units    pCO2, Arterial 27.1 (L) 35.0 - 45.0 mm Hg    pO2, Arterial 79.5 (L) 80.0 - 100.0 mm Hg    HCO3, Arterial 16.2 (L) 22.0 - 28.0 mmol/L    Base Excess, Arterial -7.1 (L) 0.0 - 2.0 mmol/L    O2 Saturation Calculated 95.7 92.0 - 99.0 %    Barometric Pressure for Blood Gas 760.6 mmHg    Modality Cannula      Flow Rate 1 lpm    Set [facility] Resp Rate 26    Troponin    Specimen: Hand, Left; Blood   Result Value Ref Range    Troponin T <0.010 0.000 - 0.030 ng/mL   Troponin    Specimen: Blood   Result Value Ref Range    Troponin T <0.010 0.000 - 0.030 ng/mL   Comprehensive Metabolic Panel    Specimen: Blood   Result Value Ref Range    Glucose 117 (H) 65 - 99 mg/dL    BUN 30 (H) 8 - 23 mg/dL    Creatinine 1.13 (H) 0.57 - 1.00 mg/dL    Sodium 137 136 - 145 mmol/L    Potassium 4.4 3.5 - 5.2 mmol/L    Chloride 108 (H) 98 - 107 mmol/L    CO2 17.3 (L) 22.0 - 29.0 mmol/L    Calcium 8.6 8.6 - 10.5 mg/dL    Total Protein 6.0 6.0 - 8.5 g/dL    Albumin 3.20 (L) 3.50 - 5.20 g/dL    ALT (SGPT) 11 1 - 33 U/L    AST (SGOT) 17 1 - 32 U/L    Alkaline Phosphatase 104 39 - 117 U/L    Total Bilirubin 0.2 0.0 - 1.2 mg/dL    eGFR Non African Amer 48 (L) >60 mL/min/1.73    Globulin 2.8 gm/dL    A/G Ratio 1.1 g/dL    BUN/Creatinine Ratio 26.5 (H) 7.0 - 25.0    Anion Gap 11.7 5.0 - 15.0 mmol/L   Magnesium    Specimen: Blood   Result Value Ref Range    Magnesium 2.2 1.6 - 2.4 mg/dL   CK    Specimen: Blood   Result Value Ref Range    Creatine Kinase 65 20 - 180 U/L   C-reactive Protein    Specimen: Blood   Result Value Ref Range    C-Reactive Protein 20.15 (H) 0.00 - 0.50 mg/dL   Ferritin    Specimen: Blood   Result Value Ref Range    Ferritin 382.00 (H) 13.00 - 150.00 ng/mL   Lactate Dehydrogenase    Specimen: Blood   Result Value Ref Range     (H) 135 - 214 U/L   D-dimer, Quantitative    Specimen: Blood   Result Value Ref Range    D-Dimer, Quantitative 0.75 (H) 0.00 - 0.49 MCGFEU/mL   Fibrinogen    Specimen: Blood   Result Value Ref Range    Fibrinogen 510 (H) 219 - 464 mg/dL   CBC Auto Differential    Specimen: Blood   Result Value Ref Range    WBC 5.49 3.40 - 10.80 10*3/mm3    RBC 4.33 3.77 - 5.28 10*6/mm3    Hemoglobin 12.9 12.0 - 15.9 g/dL    Hematocrit 38.9 34.0 - 46.6 %    MCV 89.8 79.0 - 97.0 fL    MCH 29.8 26.6 - 33.0 pg    MCHC 33.2  31.5 - 35.7 g/dL    RDW 14.2 12.3 - 15.4 %    RDW-SD 46.0 37.0 - 54.0 fl    MPV 10.4 6.0 - 12.0 fL    Platelets 266 140 - 450 10*3/mm3    Neutrophil % 79.4 (H) 42.7 - 76.0 %    Lymphocyte % 14.2 (L) 19.6 - 45.3 %    Monocyte % 5.5 5.0 - 12.0 %    Eosinophil % 0.0 (L) 0.3 - 6.2 %    Basophil % 0.2 0.0 - 1.5 %    Immature Grans % 0.7 (H) 0.0 - 0.5 %    Neutrophils, Absolute 4.36 1.70 - 7.00 10*3/mm3    Lymphocytes, Absolute 0.78 0.70 - 3.10 10*3/mm3    Monocytes, Absolute 0.30 0.10 - 0.90 10*3/mm3    Eosinophils, Absolute 0.00 0.00 - 0.40 10*3/mm3    Basophils, Absolute 0.01 0.00 - 0.20 10*3/mm3    Immature Grans, Absolute 0.04 0.00 - 0.05 10*3/mm3    nRBC 0.0 0.0 - 0.2 /100 WBC   Comprehensive Metabolic Panel    Specimen: Blood   Result Value Ref Range    Glucose 107 (H) 65 - 99 mg/dL    BUN 36 (H) 8 - 23 mg/dL    Creatinine 1.05 (H) 0.57 - 1.00 mg/dL    Sodium 140 136 - 145 mmol/L    Potassium 4.2 3.5 - 5.2 mmol/L    Chloride 112 (H) 98 - 107 mmol/L    CO2 18.3 (L) 22.0 - 29.0 mmol/L    Calcium 9.1 8.6 - 10.5 mg/dL    Total Protein 6.0 6.0 - 8.5 g/dL    Albumin 3.30 (L) 3.50 - 5.20 g/dL    ALT (SGPT) 11 1 - 33 U/L    AST (SGOT) 18 1 - 32 U/L    Alkaline Phosphatase 103 39 - 117 U/L    Total Bilirubin 0.2 0.0 - 1.2 mg/dL    eGFR Non African Amer 52 (L) >60 mL/min/1.73    Globulin 2.7 gm/dL    A/G Ratio 1.2 g/dL    BUN/Creatinine Ratio 34.3 (H) 7.0 - 25.0    Anion Gap 9.7 5.0 - 15.0 mmol/L   CK    Specimen: Blood   Result Value Ref Range    Creatine Kinase 48 20 - 180 U/L   C-reactive Protein    Specimen: Blood   Result Value Ref Range    C-Reactive Protein 9.95 (H) 0.00 - 0.50 mg/dL   Ferritin    Specimen: Blood   Result Value Ref Range    Ferritin 427.00 (H) 13.00 - 150.00 ng/mL   CBC Auto Differential    Specimen: Blood   Result Value Ref Range    WBC 5.98 3.40 - 10.80 10*3/mm3    RBC 6.08 (H) 3.77 - 5.28 10*6/mm3    Hemoglobin 18.4 (H) 12.0 - 15.9 g/dL    Hematocrit 53.9 (H) 34.0 - 46.6 %    MCV 88.7 79.0 - 97.0  fL    MCH 30.3 26.6 - 33.0 pg    MCHC 34.1 31.5 - 35.7 g/dL    RDW 15.1 12.3 - 15.4 %    RDW-SD 46.8 37.0 - 54.0 fl    MPV 10.0 6.0 - 12.0 fL    Platelets 172 140 - 450 10*3/mm3    Neutrophil % 83.8 (H) 42.7 - 76.0 %    Lymphocyte % 9.0 (L) 19.6 - 45.3 %    Monocyte % 5.9 5.0 - 12.0 %    Eosinophil % 0.0 (L) 0.3 - 6.2 %    Basophil % 0.3 0.0 - 1.5 %    Immature Grans % 1.0 (H) 0.0 - 0.5 %    Neutrophils, Absolute 5.01 1.70 - 7.00 10*3/mm3    Lymphocytes, Absolute 0.54 (L) 0.70 - 3.10 10*3/mm3    Monocytes, Absolute 0.35 0.10 - 0.90 10*3/mm3    Eosinophils, Absolute 0.00 0.00 - 0.40 10*3/mm3    Basophils, Absolute 0.02 0.00 - 0.20 10*3/mm3    Immature Grans, Absolute 0.06 (H) 0.00 - 0.05 10*3/mm3    nRBC 0.2 0.0 - 0.2 /100 WBC   Comprehensive Metabolic Panel    Specimen: Blood   Result Value Ref Range    Glucose 95 65 - 99 mg/dL    BUN 34 (H) 8 - 23 mg/dL    Creatinine 1.15 (H) 0.57 - 1.00 mg/dL    Sodium 137 136 - 145 mmol/L    Potassium 4.0 3.5 - 5.2 mmol/L    Chloride 110 (H) 98 - 107 mmol/L    CO2 18.6 (L) 22.0 - 29.0 mmol/L    Calcium 8.9 8.6 - 10.5 mg/dL    Total Protein 5.9 (L) 6.0 - 8.5 g/dL    Albumin 3.30 (L) 3.50 - 5.20 g/dL    ALT (SGPT) 15 1 - 33 U/L    AST (SGOT) 19 1 - 32 U/L    Alkaline Phosphatase 104 39 - 117 U/L    Total Bilirubin 0.2 0.0 - 1.2 mg/dL    eGFR Non African Amer 47 (L) >60 mL/min/1.73    Globulin 2.6 gm/dL    A/G Ratio 1.3 g/dL    BUN/Creatinine Ratio 29.6 (H) 7.0 - 25.0    Anion Gap 8.4 5.0 - 15.0 mmol/L   CK    Specimen: Blood   Result Value Ref Range    Creatine Kinase 45 20 - 180 U/L   C-reactive Protein    Specimen: Blood   Result Value Ref Range    C-Reactive Protein 4.81 (H) 0.00 - 0.50 mg/dL   Ferritin    Specimen: Blood   Result Value Ref Range    Ferritin 415.00 (H) 13.00 - 150.00 ng/mL   Lactate Dehydrogenase    Specimen: Blood   Result Value Ref Range     (H) 135 - 214 U/L   D-dimer, Quantitative    Specimen: Blood   Result Value Ref Range    D-Dimer, Quantitative  0.69 (H) 0.00 - 0.49 MCGFEU/mL   Fibrinogen    Specimen: Blood   Result Value Ref Range    Fibrinogen 381 219 - 464 mg/dL   CBC Auto Differential    Specimen: Blood   Result Value Ref Range    WBC 10.21 3.40 - 10.80 10*3/mm3    RBC 4.39 3.77 - 5.28 10*6/mm3    Hemoglobin 13.0 12.0 - 15.9 g/dL    Hematocrit 38.7 34.0 - 46.6 %    MCV 88.2 79.0 - 97.0 fL    MCH 29.6 26.6 - 33.0 pg    MCHC 33.6 31.5 - 35.7 g/dL    RDW 14.3 12.3 - 15.4 %    RDW-SD 46.0 37.0 - 54.0 fl    MPV 9.7 6.0 - 12.0 fL    Platelets 393 140 - 450 10*3/mm3    Neutrophil % 81.3 (H) 42.7 - 76.0 %    Lymphocyte % 9.1 (L) 19.6 - 45.3 %    Monocyte % 7.9 5.0 - 12.0 %    Eosinophil % 0.0 (L) 0.3 - 6.2 %    Basophil % 0.2 0.0 - 1.5 %    Immature Grans % 1.5 (H) 0.0 - 0.5 %    Neutrophils, Absolute 8.30 (H) 1.70 - 7.00 10*3/mm3    Lymphocytes, Absolute 0.93 0.70 - 3.10 10*3/mm3    Monocytes, Absolute 0.81 0.10 - 0.90 10*3/mm3    Eosinophils, Absolute 0.00 0.00 - 0.40 10*3/mm3    Basophils, Absolute 0.02 0.00 - 0.20 10*3/mm3    Immature Grans, Absolute 0.15 (H) 0.00 - 0.05 10*3/mm3    nRBC 0.0 0.0 - 0.2 /100 WBC   Comprehensive Metabolic Panel    Specimen: Blood   Result Value Ref Range    Glucose 111 (H) 65 - 99 mg/dL    BUN 31 (H) 8 - 23 mg/dL    Creatinine 1.24 (H) 0.57 - 1.00 mg/dL    Sodium 143 136 - 145 mmol/L    Potassium 4.8 3.5 - 5.2 mmol/L    Chloride 114 (H) 98 - 107 mmol/L    CO2 19.9 (L) 22.0 - 29.0 mmol/L    Calcium 8.9 8.6 - 10.5 mg/dL    Total Protein 5.6 (L) 6.0 - 8.5 g/dL    Albumin 3.00 (L) 3.50 - 5.20 g/dL    ALT (SGPT) 13 1 - 33 U/L    AST (SGOT) 15 1 - 32 U/L    Alkaline Phosphatase 98 39 - 117 U/L    Total Bilirubin 0.2 0.0 - 1.2 mg/dL    eGFR Non African Amer 43 (L) >60 mL/min/1.73    Globulin 2.6 gm/dL    A/G Ratio 1.2 g/dL    BUN/Creatinine Ratio 25.0 7.0 - 25.0    Anion Gap 9.1 5.0 - 15.0 mmol/L   CK    Specimen: Blood   Result Value Ref Range    Creatine Kinase 32 20 - 180 U/L   C-reactive Protein    Specimen: Blood   Result Value  Ref Range    C-Reactive Protein 3.47 (H) 0.00 - 0.50 mg/dL   Ferritin    Specimen: Blood   Result Value Ref Range    Ferritin 351.00 (H) 13.00 - 150.00 ng/mL   CBC Auto Differential    Specimen: Blood   Result Value Ref Range    WBC 7.20 3.40 - 10.80 10*3/mm3    RBC 4.05 3.77 - 5.28 10*6/mm3    Hemoglobin 12.2 12.0 - 15.9 g/dL    Hematocrit 36.7 34.0 - 46.6 %    MCV 90.6 79.0 - 97.0 fL    MCH 30.1 26.6 - 33.0 pg    MCHC 33.2 31.5 - 35.7 g/dL    RDW 14.4 12.3 - 15.4 %    RDW-SD 47.5 37.0 - 54.0 fl    MPV 9.6 6.0 - 12.0 fL    Platelets 385 140 - 450 10*3/mm3    Neutrophil % 73.7 42.7 - 76.0 %    Lymphocyte % 13.8 (L) 19.6 - 45.3 %    Monocyte % 9.0 5.0 - 12.0 %    Eosinophil % 0.1 (L) 0.3 - 6.2 %    Basophil % 0.3 0.0 - 1.5 %    Immature Grans % 3.1 (H) 0.0 - 0.5 %    Neutrophils, Absolute 5.31 1.70 - 7.00 10*3/mm3    Lymphocytes, Absolute 0.99 0.70 - 3.10 10*3/mm3    Monocytes, Absolute 0.65 0.10 - 0.90 10*3/mm3    Eosinophils, Absolute 0.01 0.00 - 0.40 10*3/mm3    Basophils, Absolute 0.02 0.00 - 0.20 10*3/mm3    Immature Grans, Absolute 0.22 (H) 0.00 - 0.05 10*3/mm3    nRBC 0.0 0.0 - 0.2 /100 WBC   ECG 12 Lead   Result Value Ref Range    QT Interval 327 ms         She still needs to f/u with cardio for hx SVT----on Toprol---overdue to see DR Christina    No recent renal stones  She did not see nephrologist  Need f/u B12 and folate      Off ACE d/t renal labs  Weight down    The following portions of the patient's history were reviewed and updated as appropriate: allergies, current medications, past family history, past medical history, past social history, past surgical history and problem list.    Review of Systems   Constitutional: Negative for activity change, appetite change and unexpected weight change.   HENT: Negative for nosebleeds and trouble swallowing.    Eyes: Negative for pain and visual disturbance.   Respiratory: Negative for chest tightness, shortness of breath and wheezing.    Cardiovascular:  Negative for chest pain and palpitations.   Gastrointestinal: Negative for abdominal pain and blood in stool.   Endocrine: Negative.    Genitourinary: Negative for difficulty urinating and hematuria.   Musculoskeletal: Negative for joint swelling.   Skin: Negative for color change and rash.   Allergic/Immunologic: Negative.    Neurological: Negative for syncope and speech difficulty.   Hematological: Negative for adenopathy.   Psychiatric/Behavioral: Negative for agitation and confusion.   All other systems reviewed and are negative.      Objective   Physical Exam  Vitals and nursing note reviewed.   Constitutional:       General: She is not in acute distress.     Appearance: She is well-developed. She is not ill-appearing or toxic-appearing.   HENT:      Head: Normocephalic.      Right Ear: External ear normal.      Left Ear: External ear normal.      Nose: Nose normal.      Mouth/Throat:      Pharynx: Oropharynx is clear.   Eyes:      General: No scleral icterus.     Conjunctiva/sclera: Conjunctivae normal.      Pupils: Pupils are equal, round, and reactive to light.   Neck:      Thyroid: No thyromegaly.      Vascular: No carotid bruit.   Cardiovascular:      Rate and Rhythm: Normal rate and regular rhythm.      Pulses: Normal pulses.      Heart sounds: Normal heart sounds. No murmur heard.      Pulmonary:      Effort: Pulmonary effort is normal. No respiratory distress.      Breath sounds: Normal breath sounds.   Musculoskeletal:         General: No deformity. Normal range of motion.      Cervical back: Normal range of motion and neck supple.   Skin:     General: Skin is warm and dry.      Findings: No rash.   Neurological:      General: No focal deficit present.      Mental Status: She is alert and oriented to person, place, and time. Mental status is at baseline.   Psychiatric:         Mood and Affect: Mood normal.         Behavior: Behavior normal.         Thought Content: Thought content normal.          Judgment: Judgment normal.         Assessment/Plan   Diagnoses and all orders for this visit:    1. Primary hypertension (Primary)  -     Comprehensive metabolic panel  -     Lipid panel  -     CBC and Differential  -     TSH  -     Hemoglobin A1c  -     T3, Free  -     T4, Free  -     Vitamin B12  -     Folate  -     Vitamin D 25 Hydroxy    2. Hypothyroidism, acquired  -     Comprehensive metabolic panel  -     Lipid panel  -     CBC and Differential  -     TSH  -     Hemoglobin A1c  -     T3, Free  -     T4, Free  -     Vitamin B12  -     Folate  -     Vitamin D 25 Hydroxy    3. Folic acid deficiency  -     Comprehensive metabolic panel  -     Lipid panel  -     CBC and Differential  -     TSH  -     Hemoglobin A1c  -     T3, Free  -     T4, Free  -     Vitamin B12  -     Folate  -     Vitamin D 25 Hydroxy    4. Vitamin D deficiency  -     Comprehensive metabolic panel  -     Lipid panel  -     CBC and Differential  -     TSH  -     Hemoglobin A1c  -     T3, Free  -     T4, Free  -     Vitamin B12  -     Folate  -     Vitamin D 25 Hydroxy    5. Low serum vitamin B12  -     Comprehensive metabolic panel  -     Lipid panel  -     CBC and Differential  -     TSH  -     Hemoglobin A1c  -     T3, Free  -     T4, Free  -     Vitamin B12  -     Folate  -     Vitamin D 25 Hydroxy    6. PSVT (paroxysmal supraventricular tachycardia) ([facility])  -     Comprehensive metabolic panel  -     Lipid panel  -     CBC and Differential  -     TSH  -     Hemoglobin A1c  -     T3, Free  -     T4, Free  -     Vitamin B12  -     Folate  -     Vitamin D 25 Hydroxy    7. CKD (chronic kidney disease) stage 4, GFR 15-29 ml/min ([facility])  -     Comprehensive metabolic panel  -     Lipid panel  -     CBC and Differential  -     TSH  -     Hemoglobin A1c  -     T3, Free  -     T4, Free  -     Vitamin B12  -     Folate  -     Vitamin D 25 Hydroxy    8. Primary osteoarthritis involving multiple joints  -     Comprehensive metabolic panel  -     Lipid  panel  -     CBC and Differential  -     TSH  -     Hemoglobin A1c  -     T3, Free  -     T4, Free  -     Vitamin B12  -     Folate  -     Vitamin D 25 Hydroxy    9. Mixed hyperlipidemia  -     Comprehensive metabolic panel  -     Lipid panel  -     CBC and Differential  -     TSH  -     Hemoglobin A1c  -     T3, Free  -     T4, Free  -     Vitamin B12  -     Folate  -     Vitamin D 25 Hydroxy    10. Impaired fasting glucose  -     Comprehensive metabolic panel  -     Lipid panel  -     CBC and Differential  -     TSH  -     Hemoglobin A1c  -     T3, Free  -     T4, Free  -     Vitamin B12  -     Folate  -     Vitamin D 25 Hydroxy    Other orders  -     albuterol sulfate  (90 Base) MCG/ACT inhaler; Inhale 2 puffs Every 2 (Two) Hours As Needed for Wheezing or Shortness of Air.  Dispense: 18 g; Refill: 11  -     folic acid (FOLVITE) 1 MG tablet; Take 1 tablet by mouth Daily.  Dispense: 90 tablet; Refill: 3  -     rosuvastatin (CRESTOR) 20 MG tablet; Take 1 tablet by mouth Daily. for cholesterol  Dispense: 90 tablet; Refill: 3  -     metoprolol succinate XL (TOPROL-XL) 25 MG 24 hr tablet; Take 1 tablet by mouth Daily. For heart rate  Dispense: 90 tablet; Refill: 3  -     amLODIPine (NORVASC) 5 MG tablet; Take 1 tablet by mouth Daily. for blood pressure  Dispense: 90 tablet; Refill: 1  -     levothyroxine (Euthyrox) 25 MCG tablet; Take 1 tablet by mouth Daily. Except Sunday for thyroid  Dispense: 90 tablet; Refill: 1  -     Cyanocobalamin 1000 MCG capsule; 1 PO daily  Dispense: 90 capsule; Refill: 3    Plan, Leonard Best, was seen today.  she was seen for HTN and continue medication, Imparied fasting glucose and plan follow up labs, diet, and exercise, Hyperlipidemia and will continue current medication, Hypothyroidism and will need to update labs for continued treatment and Vitamin D deficiency and will update labs .  Labs today  Blood pressure is mildly elevated and remains on 2 medications.  Avoiding ACE  inhibitors due to renal functions and raised her potassium in the past.  She will get home blood pressure readings and update me.  Reluctant to add medication due to white coat type elevations in blood pressure when she comes in  Overdue to see cardiologist  Declines mammo, DEXA, low dose CT chest  Albuterol as needed for wheezing history of smoking  Update B12 and folic acid still taking both

## 2021-11-19 RX ORDER — METOPROLOL SUCCINATE 25 MG/1
TABLET, EXTENDED RELEASE ORAL
Qty: 30 TABLET | Refills: 0 | Status: SHIPPED | OUTPATIENT
Start: 2021-11-19 | End: 2022-10-05 | Stop reason: SDUPTHER

## 2022-07-01 RX ORDER — LEVOTHYROXINE SODIUM 25 UG/1
TABLET ORAL
Qty: 90 TABLET | Refills: 0 | OUTPATIENT
Start: 2022-07-01

## 2022-10-05 ENCOUNTER — OFFICE VISIT (OUTPATIENT)
Dept: FAMILY MEDICINE CLINIC | Facility: CLINIC | Age: 72
End: 2022-10-05

## 2022-10-05 VITALS
RESPIRATION RATE: 16 BRPM | HEART RATE: 72 BPM | DIASTOLIC BLOOD PRESSURE: 100 MMHG | SYSTOLIC BLOOD PRESSURE: 150 MMHG | OXYGEN SATURATION: 98 % | TEMPERATURE: 97.5 F | WEIGHT: 167.6 LBS | BODY MASS INDEX: 26.93 KG/M2 | HEIGHT: 66 IN

## 2022-10-05 DIAGNOSIS — E55.9 VITAMIN D DEFICIENCY: ICD-10-CM

## 2022-10-05 DIAGNOSIS — R80.9 PROTEINURIA, UNSPECIFIED TYPE: ICD-10-CM

## 2022-10-05 DIAGNOSIS — I47.1 PSVT (PAROXYSMAL SUPRAVENTRICULAR TACHYCARDIA): ICD-10-CM

## 2022-10-05 DIAGNOSIS — E78.2 MIXED HYPERLIPIDEMIA: ICD-10-CM

## 2022-10-05 DIAGNOSIS — E53.8 FOLIC ACID DEFICIENCY: ICD-10-CM

## 2022-10-05 DIAGNOSIS — E53.8 LOW SERUM VITAMIN B12: ICD-10-CM

## 2022-10-05 DIAGNOSIS — R73.01 IMPAIRED FASTING GLUCOSE: ICD-10-CM

## 2022-10-05 DIAGNOSIS — N18.31 STAGE 3A CHRONIC KIDNEY DISEASE: ICD-10-CM

## 2022-10-05 DIAGNOSIS — I10 PRIMARY HYPERTENSION: Primary | ICD-10-CM

## 2022-10-05 PROCEDURE — 1159F MED LIST DOCD IN RCRD: CPT | Performed by: PHYSICIAN ASSISTANT

## 2022-10-05 PROCEDURE — G0439 PPPS, SUBSEQ VISIT: HCPCS | Performed by: PHYSICIAN ASSISTANT

## 2022-10-05 PROCEDURE — 1170F FXNL STATUS ASSESSED: CPT | Performed by: PHYSICIAN ASSISTANT

## 2022-10-05 PROCEDURE — 99214 OFFICE O/P EST MOD 30 MIN: CPT | Performed by: PHYSICIAN ASSISTANT

## 2022-10-05 RX ORDER — METOPROLOL SUCCINATE 25 MG/1
25 TABLET, EXTENDED RELEASE ORAL DAILY
Qty: 90 TABLET | Refills: 3 | Status: SHIPPED | OUTPATIENT
Start: 2022-10-05

## 2022-10-05 RX ORDER — ROSUVASTATIN CALCIUM 20 MG/1
20 TABLET, COATED ORAL DAILY
Qty: 90 TABLET | Refills: 3 | Status: SHIPPED | OUTPATIENT
Start: 2022-10-05

## 2022-10-05 RX ORDER — LEVOTHYROXINE SODIUM 0.03 MG/1
25 TABLET ORAL DAILY
Qty: 90 TABLET | Refills: 3 | Status: SHIPPED | OUTPATIENT
Start: 2022-10-05

## 2022-10-05 RX ORDER — AMLODIPINE BESYLATE 5 MG/1
5 TABLET ORAL DAILY
Qty: 90 TABLET | Refills: 1 | Status: SHIPPED | OUTPATIENT
Start: 2022-10-05 | End: 2023-04-03

## 2022-10-05 NOTE — PROGRESS NOTES
The ABCs of the Annual Wellness Visit  Subsequent Medicare Wellness Visit    Chief Complaint   Patient presents with   • Med Refill   • Hyperlipidemia      Subjective    History of Present Illness:  Leonard Best is a 72 y.o. female who presents for a Subsequent Medicare Wellness Visit.    The following portions of the patient's history were reviewed and   updated as appropriate: allergies, current medications, past family history, past medical history, past social history, past surgical history and problem list.    Compared to one year ago, the patient feels her physical   health is the same.    Compared to one year ago, the patient feels her mental   health is the same.    Recent Hospitalizations:  She was not admitted to the hospital during the last year.       Current Medical Providers:  Patient Care Team:  Felicia Gloria PA-C as PCP - General  Felicia Gloria PA-C as PCP - Family Medicine  Kvng Saldivar MD as Consulting Physician (Nephrology)  Murtaza Woods MD as Consulting Physician (Cardiology)  Denis Worley MD as Consulting Physician (Urology)    Outpatient Medications Prior to Visit   Medication Sig Dispense Refill   • albuterol sulfate  (90 Base) MCG/ACT inhaler Inhale 2 puffs Every 2 (Two) Hours As Needed for Wheezing or Shortness of Air. 18 g 11   • amLODIPine (NORVASC) 5 MG tablet Take 1 tablet by mouth Daily. for blood pressure 90 tablet 1   • Cholecalciferol (Vitamin D3) 1.25 MG (47871 UT) capsule Take 1 capsule by mouth once a week 5 capsule 0   • Cyanocobalamin 1000 MCG capsule 1 PO daily 90 capsule 3   • folic acid (FOLVITE) 1 MG tablet Take 1 tablet by mouth Daily. 90 tablet 3   • levothyroxine (Euthyrox) 25 MCG tablet Take 1 tablet by mouth Daily. Except Sunday for thyroid 90 tablet 1   • metoprolol succinate XL (TOPROL-XL) 25 MG 24 hr tablet Take 1 tablet by mouth once daily 30 tablet 0   • rosuvastatin (CRESTOR) 20 MG tablet Take 1 tablet by mouth Daily. for  "cholesterol 90 tablet 3   • vitamin C (VITAMIN C) 1000 MG tablet Take 1 tablet by mouth Daily. 30 tablet 0     No facility-administered medications prior to visit.       No opioid medication identified on active medication list. I have reviewed chart for other potential  high risk medication/s and harmful drug interactions in the elderly.          Aspirin is not on active medication list.  Aspirin use is not indicated based on review of current medical condition/s. Risk of harm outweighs potential benefits.  .    Patient Active Problem List   Diagnosis   • Hypertension   • Right knee pain   • Hyperlipidemia   • Osteoarthritis, multiple sites   • Primary osteoarthritis of right knee   • Vitamin D deficiency   • PSVT (paroxysmal supraventricular tachycardia) (HCC)   • Hypothyroidism, acquired   • Folic acid deficiency   • Low serum vitamin B12   • Acute kidney failure (HCC)   • Hydronephrosis   • Kidney stone   • CKD (chronic kidney disease) stage 4, GFR 15-29 ml/min ([facility])   • Pneumonia due to COVID-19 virus   • Nausea & vomiting   • Sepsis, unspecified organism ([facility])   • Chronic diastolic CHF      Advance Care Planning  Advance Directive is not on file.  ACP discussion was held with the patient during this visit. Patient has an advance directive (not in EMR), copy requested.          Objective    Vitals:    10/05/22 1250   BP: 141/82   BP Location: Left arm   Patient Position: Sitting   Cuff Size: Adult   Pulse: 72   Resp: 16   Temp: 97.5 °F (36.4 °C)   SpO2: 98%   Weight: 76 kg (167 lb 9.6 oz)   Height: 167.6 cm (65.98\")     Estimated body mass index is 27.06 kg/m² as calculated from the following:    Height as of this encounter: 167.6 cm (65.98\").    Weight as of this encounter: 76 kg (167 lb 9.6 oz).    BMI is >= 25 and <30. (Overweight) The following options were offered after discussion;: weight loss educational material (shared in after visit summary) and exercise counseling/recommendations      Does the " patient have evidence of cognitive impairment? No    Physical Exam            HEALTH RISK ASSESSMENT    Smoking Status:  Social History     Tobacco Use   Smoking Status Former Smoker   • Packs/day: 1.00   • Years: 20.00   • Pack years: 20.00   • Types: Cigarettes   • Quit date: 2015   • Years since quittin.6   Smokeless Tobacco Never Used   Tobacco Comment    CAFFEINE USE     Alcohol Consumption:  Social History     Substance and Sexual Activity   Alcohol Use Yes   • Alcohol/week: 1.0 standard drink   • Types: 1 Glasses of wine per week    Comment: OCCASIONAL     Fall Risk Screen:    GISELAADI Fall Risk Assessment was completed, and patient is at LOW risk for falls.Assessment completed on:10/5/2022    Depression Screening:  PHQ-2/PHQ-9 Depression Screening 10/5/2022   Retired Total Score -   Little Interest or Pleasure in Doing Things 0-->not at all   Feeling Down, Depressed or Hopeless 0-->not at all   PHQ-9: Brief Depression Severity Measure Score 0       Health Habits and Functional and Cognitive Screening:  Functional & Cognitive Status 10/5/2022   Do you have difficulty preparing food and eating? No   Do you have difficulty bathing yourself, getting dressed or grooming yourself? No   Do you have difficulty using the toilet? No   Do you have difficulty moving around from place to place? No   Do you have trouble with steps or getting out of a bed or a chair? No   Current Diet Well Balanced Diet   Dental Exam Not up to date   Eye Exam Up to date   Exercise (times per week) 2 times per week   Current Exercises Include Walking   Current Exercise Activities Include -   Do you need help using the phone?  No   Are you deaf or do you have serious difficulty hearing?  No   Do you need help with transportation? No   Do you need help shopping? No   Do you need help preparing meals?  No   Do you need help with housework?  No   Do you need help with laundry? No   Do you need help taking your medications? No   Do you need  help managing money? No   Do you ever drive or ride in a car without wearing a seat belt? No   Have you felt unusual stress, anger or loneliness in the last month? No   Who do you live with? Spouse   If you need help, do you have trouble finding someone available to you? No   Have you been bothered in the last four weeks by sexual problems? No   Do you have difficulty concentrating, remembering or making decisions? No       Age-appropriate Screening Schedule:  Refer to the list below for future screening recommendations based on patient's age, sex and/or medical conditions. Orders for these recommended tests are listed in the plan section. The patient has been provided with a written plan.    Health Maintenance   Topic Date Due   • ZOSTER VACCINE (2 of 2) 09/05/2016   • DXA SCAN  07/11/2018   • PAP SMEAR  11/21/2018   • MAMMOGRAM  02/06/2019   • LIPID PANEL  03/13/2021   • TDAP/TD VACCINES (2 - Td or Tdap) 07/11/2026   • INFLUENZA VACCINE  Discontinued              Assessment & Plan   CMS Preventative Services Quick Reference  Risk Factors Identified During Encounter  Cardiovascular Disease  The above risks/problems have been discussed with the patient.  Follow up actions/plans if indicated are seen below in the Assessment/Plan Section.  Pertinent information has been shared with the patient in the After Visit Summary.    There are no diagnoses linked to this encounter.    Follow Up:   No follow-ups on file.     An After Visit Summary and PPPS were made available to the patient.

## 2022-10-05 NOTE — PROGRESS NOTES
"Subjective   Leonard Best is a 72 y.o. female.     History of Present Illness    Since the last visit, she has overall felt well.  She has Impaired fasting glucose and will monitor labs to watch for DMII, Hypothyroidism and must update labs to continue treatment, Vitamin D deficiency and will update labs for continued management and Primary hypertension and will restart amlodipine today she has been out for 3 weeks.  Continues on metoprolol tartrate per cardiology for treatment and control of PSVT, mixed hyperlipidemia we will refill Crestor and has been out for 3 weeks .  she has been compliant with current medications have reviewed them.  The patient denies medication side effects.  Will refill medications. /100   Pulse 72   Temp 97.5 °F (36.4 °C)   Resp 16   Ht 167.6 cm (65.98\")   Wt 76 kg (167 lb 9.6 oz)   LMP  (LMP Unknown)   SpO2 98%   BMI 27.06 kg/m²   Out of Crestor 3 weeks; and Norvasc  Still on Toprol  Out of folic acid---and B12  Partial thyroidectomy; out med 3 weeks  Results for orders placed or performed during the hospital encounter of 12/02/20   Blood Culture - Blood, Arm, Left    Specimen: Arm, Left; Blood   Result Value Ref Range    Blood Culture No growth at 5 days    Blood Culture - Blood, Hand, Right    Specimen: Hand, Right; Blood   Result Value Ref Range    Blood Culture No growth at 5 days    Comprehensive Metabolic Panel    Specimen: Blood   Result Value Ref Range    Glucose 109 (H) 65 - 99 mg/dL    BUN 33 (H) 8 - 23 mg/dL    Creatinine 1.61 (H) 0.57 - 1.00 mg/dL    Sodium 134 (L) 136 - 145 mmol/L    Potassium 4.0 3.5 - 5.2 mmol/L    Chloride 99 98 - 107 mmol/L    CO2 18.3 (L) 22.0 - 29.0 mmol/L    Calcium 9.9 8.6 - 10.5 mg/dL    Total Protein 8.1 6.0 - 8.5 g/dL    Albumin 4.20 3.50 - 5.20 g/dL    ALT (SGPT) 12 1 - 33 U/L    AST (SGOT) 24 1 - 32 U/L    Alkaline Phosphatase 148 (H) 39 - 117 U/L    Total Bilirubin 0.5 0.0 - 1.2 mg/dL    eGFR Non  Amer 32 (L) >60 " mL/min/1.73    Globulin 3.9 gm/dL    A/G Ratio 1.1 g/dL    BUN/Creatinine Ratio 20.5 7.0 - 25.0    Anion Gap 16.7 (H) 5.0 - 15.0 mmol/L   Lactate Dehydrogenase    Specimen: Blood   Result Value Ref Range     (H) 135 - 214 U/L   Procalcitonin    Specimen: Blood   Result Value Ref Range    Procalcitonin 0.26 (H) 0.00 - 0.25 ng/mL   C-reactive Protein    Specimen: Blood   Result Value Ref Range    C-Reactive Protein 28.19 (H) 0.00 - 0.50 mg/dL   Lactic Acid, Plasma    Specimen: Blood   Result Value Ref Range    Lactate 1.9 0.5 - 2.0 mmol/L   Ferritin    Specimen: Blood   Result Value Ref Range    Ferritin 436.00 (H) 13.00 - 150.00 ng/mL   CBC Auto Differential    Specimen: Blood   Result Value Ref Range    WBC 12.09 (H) 3.40 - 10.80 10*3/mm3    RBC 5.45 (H) 3.77 - 5.28 10*6/mm3    Hemoglobin 16.1 (H) 12.0 - 15.9 g/dL    Hematocrit 48.5 (H) 34.0 - 46.6 %    MCV 89.0 79.0 - 97.0 fL    MCH 29.5 26.6 - 33.0 pg    MCHC 33.2 31.5 - 35.7 g/dL    RDW 14.4 12.3 - 15.4 %    RDW-SD 46.1 37.0 - 54.0 fl    MPV 10.3 6.0 - 12.0 fL    Platelets 325 140 - 450 10*3/mm3    Neutrophil % 81.3 (H) 42.7 - 76.0 %    Lymphocyte % 8.9 (L) 19.6 - 45.3 %    Monocyte % 9.0 5.0 - 12.0 %    Eosinophil % 0.0 (L) 0.3 - 6.2 %    Basophil % 0.2 0.0 - 1.5 %    Immature Grans % 0.6 (H) 0.0 - 0.5 %    Neutrophils, Absolute 9.82 (H) 1.70 - 7.00 10*3/mm3    Lymphocytes, Absolute 1.08 0.70 - 3.10 10*3/mm3    Monocytes, Absolute 1.09 (H) 0.10 - 0.90 10*3/mm3    Eosinophils, Absolute 0.00 0.00 - 0.40 10*3/mm3    Basophils, Absolute 0.03 0.00 - 0.20 10*3/mm3    Immature Grans, Absolute 0.07 (H) 0.00 - 0.05 10*3/mm3    nRBC 0.0 0.0 - 0.2 /100 WBC   Blood Gas, Arterial -    Specimen: Arterial Blood   Result Value Ref Range    Site Arterial: right radial     Kb's Test Positive     pH, Arterial 7.383 7.350 - 7.450 pH units    pCO2, Arterial 27.1 (L) 35.0 - 45.0 mm Hg    pO2, Arterial 79.5 (L) 80.0 - 100.0 mm Hg    HCO3, Arterial 16.2 (L) 22.0 - 28.0 mmol/L     Base Excess, Arterial -7.1 (L) 0.0 - 2.0 mmol/L    O2 Saturation Calculated 95.7 92.0 - 99.0 %    Barometric Pressure for Blood Gas 760.6 mmHg    Modality Cannula     Flow Rate 1 lpm    Set [facility] Resp Rate 26    Troponin    Specimen: Hand, Left; Blood   Result Value Ref Range    Troponin T <0.010 0.000 - 0.030 ng/mL   Troponin    Specimen: Blood   Result Value Ref Range    Troponin T <0.010 0.000 - 0.030 ng/mL   Comprehensive Metabolic Panel    Specimen: Blood   Result Value Ref Range    Glucose 117 (H) 65 - 99 mg/dL    BUN 30 (H) 8 - 23 mg/dL    Creatinine 1.13 (H) 0.57 - 1.00 mg/dL    Sodium 137 136 - 145 mmol/L    Potassium 4.4 3.5 - 5.2 mmol/L    Chloride 108 (H) 98 - 107 mmol/L    CO2 17.3 (L) 22.0 - 29.0 mmol/L    Calcium 8.6 8.6 - 10.5 mg/dL    Total Protein 6.0 6.0 - 8.5 g/dL    Albumin 3.20 (L) 3.50 - 5.20 g/dL    ALT (SGPT) 11 1 - 33 U/L    AST (SGOT) 17 1 - 32 U/L    Alkaline Phosphatase 104 39 - 117 U/L    Total Bilirubin 0.2 0.0 - 1.2 mg/dL    eGFR Non African Amer 48 (L) >60 mL/min/1.73    Globulin 2.8 gm/dL    A/G Ratio 1.1 g/dL    BUN/Creatinine Ratio 26.5 (H) 7.0 - 25.0    Anion Gap 11.7 5.0 - 15.0 mmol/L   Magnesium    Specimen: Blood   Result Value Ref Range    Magnesium 2.2 1.6 - 2.4 mg/dL   CK    Specimen: Blood   Result Value Ref Range    Creatine Kinase 65 20 - 180 U/L   C-reactive Protein    Specimen: Blood   Result Value Ref Range    C-Reactive Protein 20.15 (H) 0.00 - 0.50 mg/dL   Ferritin    Specimen: Blood   Result Value Ref Range    Ferritin 382.00 (H) 13.00 - 150.00 ng/mL   Lactate Dehydrogenase    Specimen: Blood   Result Value Ref Range     (H) 135 - 214 U/L   D-dimer, Quantitative    Specimen: Blood   Result Value Ref Range    D-Dimer, Quantitative 0.75 (H) 0.00 - 0.49 MCGFEU/mL   Fibrinogen    Specimen: Blood   Result Value Ref Range    Fibrinogen 510 (H) 219 - 464 mg/dL   CBC Auto Differential    Specimen: Blood   Result Value Ref Range    WBC 5.49 3.40 - 10.80 10*3/mm3     RBC 4.33 3.77 - 5.28 10*6/mm3    Hemoglobin 12.9 12.0 - 15.9 g/dL    Hematocrit 38.9 34.0 - 46.6 %    MCV 89.8 79.0 - 97.0 fL    MCH 29.8 26.6 - 33.0 pg    MCHC 33.2 31.5 - 35.7 g/dL    RDW 14.2 12.3 - 15.4 %    RDW-SD 46.0 37.0 - 54.0 fl    MPV 10.4 6.0 - 12.0 fL    Platelets 266 140 - 450 10*3/mm3    Neutrophil % 79.4 (H) 42.7 - 76.0 %    Lymphocyte % 14.2 (L) 19.6 - 45.3 %    Monocyte % 5.5 5.0 - 12.0 %    Eosinophil % 0.0 (L) 0.3 - 6.2 %    Basophil % 0.2 0.0 - 1.5 %    Immature Grans % 0.7 (H) 0.0 - 0.5 %    Neutrophils, Absolute 4.36 1.70 - 7.00 10*3/mm3    Lymphocytes, Absolute 0.78 0.70 - 3.10 10*3/mm3    Monocytes, Absolute 0.30 0.10 - 0.90 10*3/mm3    Eosinophils, Absolute 0.00 0.00 - 0.40 10*3/mm3    Basophils, Absolute 0.01 0.00 - 0.20 10*3/mm3    Immature Grans, Absolute 0.04 0.00 - 0.05 10*3/mm3    nRBC 0.0 0.0 - 0.2 /100 WBC   Comprehensive Metabolic Panel    Specimen: Blood   Result Value Ref Range    Glucose 107 (H) 65 - 99 mg/dL    BUN 36 (H) 8 - 23 mg/dL    Creatinine 1.05 (H) 0.57 - 1.00 mg/dL    Sodium 140 136 - 145 mmol/L    Potassium 4.2 3.5 - 5.2 mmol/L    Chloride 112 (H) 98 - 107 mmol/L    CO2 18.3 (L) 22.0 - 29.0 mmol/L    Calcium 9.1 8.6 - 10.5 mg/dL    Total Protein 6.0 6.0 - 8.5 g/dL    Albumin 3.30 (L) 3.50 - 5.20 g/dL    ALT (SGPT) 11 1 - 33 U/L    AST (SGOT) 18 1 - 32 U/L    Alkaline Phosphatase 103 39 - 117 U/L    Total Bilirubin 0.2 0.0 - 1.2 mg/dL    eGFR Non African Amer 52 (L) >60 mL/min/1.73    Globulin 2.7 gm/dL    A/G Ratio 1.2 g/dL    BUN/Creatinine Ratio 34.3 (H) 7.0 - 25.0    Anion Gap 9.7 5.0 - 15.0 mmol/L   CK    Specimen: Blood   Result Value Ref Range    Creatine Kinase 48 20 - 180 U/L   C-reactive Protein    Specimen: Blood   Result Value Ref Range    C-Reactive Protein 9.95 (H) 0.00 - 0.50 mg/dL   Ferritin    Specimen: Blood   Result Value Ref Range    Ferritin 427.00 (H) 13.00 - 150.00 ng/mL   CBC Auto Differential    Specimen: Blood   Result Value Ref Range    WBC  5.98 3.40 - 10.80 10*3/mm3    RBC 6.08 (H) 3.77 - 5.28 10*6/mm3    Hemoglobin 18.4 (H) 12.0 - 15.9 g/dL    Hematocrit 53.9 (H) 34.0 - 46.6 %    MCV 88.7 79.0 - 97.0 fL    MCH 30.3 26.6 - 33.0 pg    MCHC 34.1 31.5 - 35.7 g/dL    RDW 15.1 12.3 - 15.4 %    RDW-SD 46.8 37.0 - 54.0 fl    MPV 10.0 6.0 - 12.0 fL    Platelets 172 140 - 450 10*3/mm3    Neutrophil % 83.8 (H) 42.7 - 76.0 %    Lymphocyte % 9.0 (L) 19.6 - 45.3 %    Monocyte % 5.9 5.0 - 12.0 %    Eosinophil % 0.0 (L) 0.3 - 6.2 %    Basophil % 0.3 0.0 - 1.5 %    Immature Grans % 1.0 (H) 0.0 - 0.5 %    Neutrophils, Absolute 5.01 1.70 - 7.00 10*3/mm3    Lymphocytes, Absolute 0.54 (L) 0.70 - 3.10 10*3/mm3    Monocytes, Absolute 0.35 0.10 - 0.90 10*3/mm3    Eosinophils, Absolute 0.00 0.00 - 0.40 10*3/mm3    Basophils, Absolute 0.02 0.00 - 0.20 10*3/mm3    Immature Grans, Absolute 0.06 (H) 0.00 - 0.05 10*3/mm3    nRBC 0.2 0.0 - 0.2 /100 WBC   Comprehensive Metabolic Panel    Specimen: Blood   Result Value Ref Range    Glucose 95 65 - 99 mg/dL    BUN 34 (H) 8 - 23 mg/dL    Creatinine 1.15 (H) 0.57 - 1.00 mg/dL    Sodium 137 136 - 145 mmol/L    Potassium 4.0 3.5 - 5.2 mmol/L    Chloride 110 (H) 98 - 107 mmol/L    CO2 18.6 (L) 22.0 - 29.0 mmol/L    Calcium 8.9 8.6 - 10.5 mg/dL    Total Protein 5.9 (L) 6.0 - 8.5 g/dL    Albumin 3.30 (L) 3.50 - 5.20 g/dL    ALT (SGPT) 15 1 - 33 U/L    AST (SGOT) 19 1 - 32 U/L    Alkaline Phosphatase 104 39 - 117 U/L    Total Bilirubin 0.2 0.0 - 1.2 mg/dL    eGFR Non African Amer 47 (L) >60 mL/min/1.73    Globulin 2.6 gm/dL    A/G Ratio 1.3 g/dL    BUN/Creatinine Ratio 29.6 (H) 7.0 - 25.0    Anion Gap 8.4 5.0 - 15.0 mmol/L   CK    Specimen: Blood   Result Value Ref Range    Creatine Kinase 45 20 - 180 U/L   C-reactive Protein    Specimen: Blood   Result Value Ref Range    C-Reactive Protein 4.81 (H) 0.00 - 0.50 mg/dL   Ferritin    Specimen: Blood   Result Value Ref Range    Ferritin 415.00 (H) 13.00 - 150.00 ng/mL   Lactate Dehydrogenase     Specimen: Blood   Result Value Ref Range     (H) 135 - 214 U/L   D-dimer, Quantitative    Specimen: Blood   Result Value Ref Range    D-Dimer, Quantitative 0.69 (H) 0.00 - 0.49 MCGFEU/mL   Fibrinogen    Specimen: Blood   Result Value Ref Range    Fibrinogen 381 219 - 464 mg/dL   CBC Auto Differential    Specimen: Blood   Result Value Ref Range    WBC 10.21 3.40 - 10.80 10*3/mm3    RBC 4.39 3.77 - 5.28 10*6/mm3    Hemoglobin 13.0 12.0 - 15.9 g/dL    Hematocrit 38.7 34.0 - 46.6 %    MCV 88.2 79.0 - 97.0 fL    MCH 29.6 26.6 - 33.0 pg    MCHC 33.6 31.5 - 35.7 g/dL    RDW 14.3 12.3 - 15.4 %    RDW-SD 46.0 37.0 - 54.0 fl    MPV 9.7 6.0 - 12.0 fL    Platelets 393 140 - 450 10*3/mm3    Neutrophil % 81.3 (H) 42.7 - 76.0 %    Lymphocyte % 9.1 (L) 19.6 - 45.3 %    Monocyte % 7.9 5.0 - 12.0 %    Eosinophil % 0.0 (L) 0.3 - 6.2 %    Basophil % 0.2 0.0 - 1.5 %    Immature Grans % 1.5 (H) 0.0 - 0.5 %    Neutrophils, Absolute 8.30 (H) 1.70 - 7.00 10*3/mm3    Lymphocytes, Absolute 0.93 0.70 - 3.10 10*3/mm3    Monocytes, Absolute 0.81 0.10 - 0.90 10*3/mm3    Eosinophils, Absolute 0.00 0.00 - 0.40 10*3/mm3    Basophils, Absolute 0.02 0.00 - 0.20 10*3/mm3    Immature Grans, Absolute 0.15 (H) 0.00 - 0.05 10*3/mm3    nRBC 0.0 0.0 - 0.2 /100 WBC   Comprehensive Metabolic Panel    Specimen: Blood   Result Value Ref Range    Glucose 111 (H) 65 - 99 mg/dL    BUN 31 (H) 8 - 23 mg/dL    Creatinine 1.24 (H) 0.57 - 1.00 mg/dL    Sodium 143 136 - 145 mmol/L    Potassium 4.8 3.5 - 5.2 mmol/L    Chloride 114 (H) 98 - 107 mmol/L    CO2 19.9 (L) 22.0 - 29.0 mmol/L    Calcium 8.9 8.6 - 10.5 mg/dL    Total Protein 5.6 (L) 6.0 - 8.5 g/dL    Albumin 3.00 (L) 3.50 - 5.20 g/dL    ALT (SGPT) 13 1 - 33 U/L    AST (SGOT) 15 1 - 32 U/L    Alkaline Phosphatase 98 39 - 117 U/L    Total Bilirubin 0.2 0.0 - 1.2 mg/dL    eGFR Non African Amer 43 (L) >60 mL/min/1.73    Globulin 2.6 gm/dL    A/G Ratio 1.2 g/dL    BUN/Creatinine Ratio 25.0 7.0 - 25.0    Anion  Gap 9.1 5.0 - 15.0 mmol/L   CK    Specimen: Blood   Result Value Ref Range    Creatine Kinase 32 20 - 180 U/L   C-reactive Protein    Specimen: Blood   Result Value Ref Range    C-Reactive Protein 3.47 (H) 0.00 - 0.50 mg/dL   Ferritin    Specimen: Blood   Result Value Ref Range    Ferritin 351.00 (H) 13.00 - 150.00 ng/mL   CBC Auto Differential    Specimen: Blood   Result Value Ref Range    WBC 7.20 3.40 - 10.80 10*3/mm3    RBC 4.05 3.77 - 5.28 10*6/mm3    Hemoglobin 12.2 12.0 - 15.9 g/dL    Hematocrit 36.7 34.0 - 46.6 %    MCV 90.6 79.0 - 97.0 fL    MCH 30.1 26.6 - 33.0 pg    MCHC 33.2 31.5 - 35.7 g/dL    RDW 14.4 12.3 - 15.4 %    RDW-SD 47.5 37.0 - 54.0 fl    MPV 9.6 6.0 - 12.0 fL    Platelets 385 140 - 450 10*3/mm3    Neutrophil % 73.7 42.7 - 76.0 %    Lymphocyte % 13.8 (L) 19.6 - 45.3 %    Monocyte % 9.0 5.0 - 12.0 %    Eosinophil % 0.1 (L) 0.3 - 6.2 %    Basophil % 0.3 0.0 - 1.5 %    Immature Grans % 3.1 (H) 0.0 - 0.5 %    Neutrophils, Absolute 5.31 1.70 - 7.00 10*3/mm3    Lymphocytes, Absolute 0.99 0.70 - 3.10 10*3/mm3    Monocytes, Absolute 0.65 0.10 - 0.90 10*3/mm3    Eosinophils, Absolute 0.01 0.00 - 0.40 10*3/mm3    Basophils, Absolute 0.02 0.00 - 0.20 10*3/mm3    Immature Grans, Absolute 0.22 (H) 0.00 - 0.05 10*3/mm3    nRBC 0.0 0.0 - 0.2 /100 WBC   ECG 12 Lead   Result Value Ref Range    QT Interval 327 ms   History of renal stones and has been to urology    Discussion again today on patient compliance must see me every 6 months and has been 11 months since her last visit and will consider terminating patient if does not comply.  She still needs to f/u with cardio for hx SVT----on Toprol---overdue to see DR Christina     No recent renal stones  She did not see nephrologist  Need f/u B12 and folate        Off ACE d/t renal labs  Was seen by Dr. Kvng Saldivar nephrology 12/12/2019 for KCD with follow-up from acute kidney injury--- due to NSAIDs and ACE  Declines repeat CXR----from 2020; declines low dose CT  chest screen  Stopped smoking 10 yrs ago    She is walking more  Has hx CKD  The following portions of the patient's history were reviewed and updated as appropriate: allergies, current medications, past family history, past medical history, past social history, past surgical history and problem list.    Review of Systems   Constitutional: Negative for activity change, appetite change and unexpected weight change.   HENT: Negative for nosebleeds and trouble swallowing.    Eyes: Negative for pain and visual disturbance.   Respiratory: Negative for chest tightness, shortness of breath and wheezing.    Cardiovascular: Negative for chest pain and palpitations.   Gastrointestinal: Negative for abdominal pain and blood in stool.   Endocrine: Negative.    Genitourinary: Negative for difficulty urinating and hematuria.   Musculoskeletal: Negative for joint swelling.   Skin: Negative for color change and rash.   Allergic/Immunologic: Negative.    Neurological: Negative for syncope and speech difficulty.   Hematological: Negative for adenopathy.   Psychiatric/Behavioral: Negative for agitation and confusion.   All other systems reviewed and are negative.      Objective   Physical Exam  Vitals and nursing note reviewed.   Constitutional:       General: She is not in acute distress.     Appearance: She is well-developed. She is not ill-appearing or toxic-appearing.   HENT:      Head: Normocephalic.      Right Ear: External ear normal.      Left Ear: External ear normal.      Nose: Nose normal.      Mouth/Throat:      Pharynx: Oropharynx is clear.   Eyes:      General: No scleral icterus.     Conjunctiva/sclera: Conjunctivae normal.      Pupils: Pupils are equal, round, and reactive to light.   Neck:      Thyroid: No thyromegaly.      Vascular: No carotid bruit.   Cardiovascular:      Rate and Rhythm: Normal rate and regular rhythm.      Heart sounds: Normal heart sounds. No murmur heard.  Pulmonary:      Effort: Pulmonary  effort is normal. No respiratory distress.      Breath sounds: Normal breath sounds.   Musculoskeletal:         General: No deformity. Normal range of motion.      Cervical back: Normal range of motion and neck supple.      Right lower leg: No edema.      Left lower leg: Edema present.   Skin:     General: Skin is warm and dry.      Findings: No rash.   Neurological:      General: No focal deficit present.      Mental Status: She is alert and oriented to person, place, and time. Mental status is at baseline.   Psychiatric:         Mood and Affect: Mood normal.         Behavior: Behavior normal.         Thought Content: Thought content normal.         Judgment: Judgment normal.         Assessment & Plan   Diagnoses and all orders for this visit:    1. Primary hypertension (Primary)  -     Comprehensive metabolic panel  -     Lipid panel  -     CBC and Differential  -     TSH  -     Urinalysis With Microscopic - Urine, Clean Catch  -     T3, Free  -     T4, Free  -     Vitamin D 25 Hydroxy  -     Vitamin B12  -     Folate  -     Microalbumin / Creatinine Urine Ratio - Urine, Clean Catch  -     Hemoglobin A1c    2. PSVT (paroxysmal supraventricular tachycardia) (HCC)  -     Comprehensive metabolic panel  -     Lipid panel  -     CBC and Differential  -     TSH  -     Urinalysis With Microscopic - Urine, Clean Catch  -     T3, Free  -     T4, Free  -     Vitamin D 25 Hydroxy  -     Vitamin B12  -     Folate  -     Microalbumin / Creatinine Urine Ratio - Urine, Clean Catch  -     Hemoglobin A1c    3. Vitamin D deficiency  -     Comprehensive metabolic panel  -     Lipid panel  -     CBC and Differential  -     TSH  -     Urinalysis With Microscopic - Urine, Clean Catch  -     T3, Free  -     T4, Free  -     Vitamin D 25 Hydroxy  -     Vitamin B12  -     Folate  -     Microalbumin / Creatinine Urine Ratio - Urine, Clean Catch  -     Hemoglobin A1c    4. Mixed hyperlipidemia  -     Comprehensive metabolic panel  -      Lipid panel  -     CBC and Differential  -     TSH  -     Urinalysis With Microscopic - Urine, Clean Catch  -     T3, Free  -     T4, Free  -     Vitamin D 25 Hydroxy  -     Vitamin B12  -     Folate  -     Microalbumin / Creatinine Urine Ratio - Urine, Clean Catch  -     Hemoglobin A1c    5. Folic acid deficiency  -     Comprehensive metabolic panel  -     Lipid panel  -     CBC and Differential  -     TSH  -     Urinalysis With Microscopic - Urine, Clean Catch  -     T3, Free  -     T4, Free  -     Vitamin D 25 Hydroxy  -     Vitamin B12  -     Folate  -     Microalbumin / Creatinine Urine Ratio - Urine, Clean Catch  -     Hemoglobin A1c    6. Low serum vitamin B12  -     Comprehensive metabolic panel  -     Lipid panel  -     CBC and Differential  -     TSH  -     Urinalysis With Microscopic - Urine, Clean Catch  -     T3, Free  -     T4, Free  -     Vitamin D 25 Hydroxy  -     Vitamin B12  -     Folate  -     Microalbumin / Creatinine Urine Ratio - Urine, Clean Catch  -     Hemoglobin A1c    7. Stage 3a chronic kidney disease (HCC)  -     Comprehensive metabolic panel  -     Lipid panel  -     CBC and Differential  -     TSH  -     Urinalysis With Microscopic - Urine, Clean Catch  -     T3, Free  -     T4, Free  -     Vitamin D 25 Hydroxy  -     Vitamin B12  -     Folate  -     Microalbumin / Creatinine Urine Ratio - Urine, Clean Catch  -     Hemoglobin A1c    8. Impaired fasting glucose  -     Comprehensive metabolic panel  -     Lipid panel  -     CBC and Differential  -     TSH  -     Urinalysis With Microscopic - Urine, Clean Catch  -     T3, Free  -     T4, Free  -     Vitamin D 25 Hydroxy  -     Vitamin B12  -     Folate  -     Microalbumin / Creatinine Urine Ratio - Urine, Clean Catch  -     Hemoglobin A1c    Other orders  -     metoprolol succinate XL (TOPROL-XL) 25 MG 24 hr tablet; Take 1 tablet by mouth Daily. For heart rate  Dispense: 90 tablet; Refill: 3  -     rosuvastatin (CRESTOR) 20 MG tablet;  Take 1 tablet by mouth Daily. for cholesterol  Dispense: 90 tablet; Refill: 3  -     levothyroxine (Euthyrox) 25 MCG tablet; Take 1 tablet by mouth Daily. Except Sunday for thyroid  Dispense: 90 tablet; Refill: 3  -     amLODIPine (NORVASC) 5 MG tablet; Take 1 tablet by mouth Daily. for blood pressure  Dispense: 90 tablet; Refill: 1        Patient declines mammogram, bone density, low-dose CT--- she can message me if does want to order  Plan, Leonard Best, was seen today.  she was seen for Imparied fasting glucose and plan follow up labs, diet, and exercise, Hypothyroidism and will need to update labs for continued treatment, Vitamin D deficiency and will update labs  and [place] hypertension we will restart amlodipine, follow-up with cardiology for history of PSVT, will restart levothyroxine for hypothyroidism and generic Crestor for mixed hyperlipidemia, also updating B12 folic acid and D and renal functions due to history of CKD 3.  Due to see cardio  Has been on B12 and folate---out 3 weeks  Out of thyroid Rx for 3 weeks we will restart levothyroxine 25 mcg  Declines flu shot  Had hx CKD--update labs  Call bp reading 1 week

## 2022-10-06 LAB
25(OH)D3+25(OH)D2 SERPL-MCNC: 52.8 NG/ML (ref 30–100)
ALBUMIN SERPL-MCNC: 5 G/DL (ref 3.5–5.2)
ALBUMIN/CREAT UR: 62 MG/G CREAT (ref 0–29)
ALBUMIN/GLOB SERPL: 2.5 G/DL
ALP SERPL-CCNC: 134 U/L (ref 39–117)
ALT SERPL-CCNC: 9 U/L (ref 1–33)
APPEARANCE UR: ABNORMAL
AST SERPL-CCNC: 10 U/L (ref 1–32)
BACTERIA #/AREA URNS HPF: ABNORMAL /HPF
BASOPHILS # BLD AUTO: 0.12 10*3/MM3 (ref 0–0.2)
BASOPHILS NFR BLD AUTO: 1.1 % (ref 0–1.5)
BILIRUB SERPL-MCNC: 0.4 MG/DL (ref 0–1.2)
BILIRUB UR QL STRIP: NEGATIVE
BUN SERPL-MCNC: 22 MG/DL (ref 8–23)
BUN/CREAT SERPL: 20 (ref 7–25)
CALCIUM SERPL-MCNC: 10.3 MG/DL (ref 8.6–10.5)
CASTS URNS MICRO: ABNORMAL
CHLORIDE SERPL-SCNC: 108 MMOL/L (ref 98–107)
CHOLEST SERPL-MCNC: 295 MG/DL (ref 0–200)
CO2 SERPL-SCNC: 20.7 MMOL/L (ref 22–29)
COLOR UR: YELLOW
CREAT SERPL-MCNC: 1.1 MG/DL (ref 0.57–1)
CREAT UR-MCNC: 122.1 MG/DL
EGFRCR SERPLBLD CKD-EPI 2021: 53.8 ML/MIN/1.73
EOSINOPHIL # BLD AUTO: 0.3 10*3/MM3 (ref 0–0.4)
EOSINOPHIL NFR BLD AUTO: 2.7 % (ref 0.3–6.2)
EPI CELLS #/AREA URNS HPF: ABNORMAL /HPF
ERYTHROCYTE [DISTWIDTH] IN BLOOD BY AUTOMATED COUNT: 12.7 % (ref 12.3–15.4)
FOLATE SERPL-MCNC: 8.45 NG/ML (ref 4.78–24.2)
GLOBULIN SER CALC-MCNC: 2 GM/DL
GLUCOSE SERPL-MCNC: 86 MG/DL (ref 65–99)
GLUCOSE UR QL STRIP: NEGATIVE
HBA1C MFR BLD: 5.3 % (ref 4.8–5.6)
HCT VFR BLD AUTO: 49 % (ref 34–46.6)
HDLC SERPL-MCNC: 51 MG/DL (ref 40–60)
HGB BLD-MCNC: 16.3 G/DL (ref 12–15.9)
HGB UR QL STRIP: ABNORMAL
IMM GRANULOCYTES # BLD AUTO: 0.03 10*3/MM3 (ref 0–0.05)
IMM GRANULOCYTES NFR BLD AUTO: 0.3 % (ref 0–0.5)
KETONES UR QL STRIP: NEGATIVE
LDLC SERPL CALC-MCNC: 183 MG/DL (ref 0–100)
LEUKOCYTE ESTERASE UR QL STRIP: ABNORMAL
LYMPHOCYTES # BLD AUTO: 2.88 10*3/MM3 (ref 0.7–3.1)
LYMPHOCYTES NFR BLD AUTO: 26.1 % (ref 19.6–45.3)
MCH RBC QN AUTO: 31.8 PG (ref 26.6–33)
MCHC RBC AUTO-ENTMCNC: 33.3 G/DL (ref 31.5–35.7)
MCV RBC AUTO: 95.5 FL (ref 79–97)
MICROALBUMIN UR-MCNC: 75.1 UG/ML
MONOCYTES # BLD AUTO: 0.74 10*3/MM3 (ref 0.1–0.9)
MONOCYTES NFR BLD AUTO: 6.7 % (ref 5–12)
NEUTROPHILS # BLD AUTO: 6.98 10*3/MM3 (ref 1.7–7)
NEUTROPHILS NFR BLD AUTO: 63.1 % (ref 42.7–76)
NITRITE UR QL STRIP: POSITIVE
NRBC BLD AUTO-RTO: 0 /100 WBC (ref 0–0.2)
PH UR STRIP: 6 [PH] (ref 5–8)
PLATELET # BLD AUTO: 288 10*3/MM3 (ref 140–450)
POTASSIUM SERPL-SCNC: 4.3 MMOL/L (ref 3.5–5.2)
PROT SERPL-MCNC: 7 G/DL (ref 6–8.5)
PROT UR QL STRIP: ABNORMAL
RBC # BLD AUTO: 5.13 10*6/MM3 (ref 3.77–5.28)
RBC #/AREA URNS HPF: ABNORMAL /HPF
SODIUM SERPL-SCNC: 141 MMOL/L (ref 136–145)
SP GR UR STRIP: 1.02 (ref 1–1.03)
T3FREE SERPL-MCNC: 3 PG/ML (ref 2–4.4)
T4 FREE SERPL-MCNC: 1.17 NG/DL (ref 0.93–1.7)
TRIGL SERPL-MCNC: 312 MG/DL (ref 0–150)
TSH SERPL DL<=0.005 MIU/L-ACNC: 1.1 UIU/ML (ref 0.27–4.2)
UROBILINOGEN UR STRIP-MCNC: ABNORMAL MG/DL
VIT B12 SERPL-MCNC: 414 PG/ML (ref 211–946)
VLDLC SERPL CALC-MCNC: 61 MG/DL (ref 5–40)
WBC # BLD AUTO: 11.05 10*3/MM3 (ref 3.4–10.8)
WBC #/AREA URNS HPF: ABNORMAL /HPF

## 2023-04-03 RX ORDER — AMLODIPINE BESYLATE 5 MG/1
TABLET ORAL
Qty: 30 TABLET | Refills: 0 | Status: SHIPPED | OUTPATIENT
Start: 2023-04-03

## 2023-04-03 NOTE — TELEPHONE ENCOUNTER
Requested Prescriptions     Pending Prescriptions Disp Refills   • amLODIPine (NORVASC) 5 MG tablet [Pharmacy Med Name: amLODIPine Besylate 5 MG Oral Tablet] 90 tablet 0     Sig: Take 1 tablet by mouth once daily for blood pressure

## 2023-05-03 RX ORDER — AMLODIPINE BESYLATE 5 MG/1
TABLET ORAL
Qty: 30 TABLET | Refills: 0 | OUTPATIENT
Start: 2023-05-03

## 2023-05-09 RX ORDER — AMLODIPINE BESYLATE 5 MG/1
TABLET ORAL
Qty: 15 TABLET | Refills: 0 | Status: SHIPPED | OUTPATIENT
Start: 2023-05-09

## 2023-10-16 ENCOUNTER — OFFICE VISIT (OUTPATIENT)
Dept: FAMILY MEDICINE CLINIC | Facility: CLINIC | Age: 73
End: 2023-10-16
Payer: MEDICARE

## 2023-10-16 VITALS
SYSTOLIC BLOOD PRESSURE: 138 MMHG | BODY MASS INDEX: 28.93 KG/M2 | HEART RATE: 88 BPM | TEMPERATURE: 97.6 F | WEIGHT: 180 LBS | HEIGHT: 66 IN | DIASTOLIC BLOOD PRESSURE: 88 MMHG | OXYGEN SATURATION: 97 % | RESPIRATION RATE: 16 BRPM

## 2023-10-16 DIAGNOSIS — R79.89 ABNORMAL CBC: ICD-10-CM

## 2023-10-16 DIAGNOSIS — I25.84 CORONARY ARTERY CALCIFICATION: ICD-10-CM

## 2023-10-16 DIAGNOSIS — I25.10 CORONARY ARTERY CALCIFICATION: ICD-10-CM

## 2023-10-16 DIAGNOSIS — I10 PRIMARY HYPERTENSION: Primary | ICD-10-CM

## 2023-10-16 DIAGNOSIS — N20.0 KIDNEY STONE: ICD-10-CM

## 2023-10-16 DIAGNOSIS — E89.0 POST-SURGICAL HYPOTHYROIDISM: ICD-10-CM

## 2023-10-16 DIAGNOSIS — Z87.891 SMOKING HISTORY: ICD-10-CM

## 2023-10-16 DIAGNOSIS — E53.8 FOLIC ACID DEFICIENCY: ICD-10-CM

## 2023-10-16 DIAGNOSIS — E55.9 VITAMIN D DEFICIENCY: ICD-10-CM

## 2023-10-16 DIAGNOSIS — I47.10 PSVT (PAROXYSMAL SUPRAVENTRICULAR TACHYCARDIA): ICD-10-CM

## 2023-10-16 DIAGNOSIS — N18.31 STAGE 3A CHRONIC KIDNEY DISEASE: ICD-10-CM

## 2023-10-16 DIAGNOSIS — E53.8 LOW SERUM VITAMIN B12: ICD-10-CM

## 2023-10-16 PROBLEM — E03.9 HYPOTHYROIDISM, ACQUIRED: Status: RESOLVED | Noted: 2019-07-20 | Resolved: 2023-10-16

## 2023-10-16 PROCEDURE — 99214 OFFICE O/P EST MOD 30 MIN: CPT | Performed by: PHYSICIAN ASSISTANT

## 2023-10-16 PROCEDURE — 3079F DIAST BP 80-89 MM HG: CPT | Performed by: PHYSICIAN ASSISTANT

## 2023-10-16 PROCEDURE — G0439 PPPS, SUBSEQ VISIT: HCPCS | Performed by: PHYSICIAN ASSISTANT

## 2023-10-16 PROCEDURE — 1170F FXNL STATUS ASSESSED: CPT | Performed by: PHYSICIAN ASSISTANT

## 2023-10-16 PROCEDURE — 1126F AMNT PAIN NOTED NONE PRSNT: CPT | Performed by: PHYSICIAN ASSISTANT

## 2023-10-16 PROCEDURE — 1160F RVW MEDS BY RX/DR IN RCRD: CPT | Performed by: PHYSICIAN ASSISTANT

## 2023-10-16 PROCEDURE — 3075F SYST BP GE 130 - 139MM HG: CPT | Performed by: PHYSICIAN ASSISTANT

## 2023-10-16 RX ORDER — FOLIC ACID 1 MG/1
1000 TABLET ORAL DAILY
Qty: 90 TABLET | Refills: 3 | Status: SHIPPED | OUTPATIENT
Start: 2023-10-16

## 2023-10-16 RX ORDER — METOPROLOL SUCCINATE 25 MG/1
25 TABLET, EXTENDED RELEASE ORAL DAILY
Qty: 90 TABLET | Refills: 3 | Status: SHIPPED | OUTPATIENT
Start: 2023-10-16

## 2023-10-16 RX ORDER — AMLODIPINE BESYLATE 5 MG/1
5 TABLET ORAL DAILY
Qty: 90 TABLET | Refills: 1 | Status: SHIPPED | OUTPATIENT
Start: 2023-10-16

## 2023-10-16 RX ORDER — LEVOTHYROXINE SODIUM 0.03 MG/1
25 TABLET ORAL DAILY
Qty: 90 TABLET | Refills: 0 | Status: SHIPPED | OUTPATIENT
Start: 2023-10-16 | End: 2023-10-17 | Stop reason: SDUPTHER

## 2023-10-16 RX ORDER — ROSUVASTATIN CALCIUM 20 MG/1
20 TABLET, COATED ORAL DAILY
Qty: 90 TABLET | Refills: 3 | Status: SHIPPED | OUTPATIENT
Start: 2023-10-16

## 2023-10-16 RX ORDER — ALBUTEROL SULFATE 90 UG/1
2 AEROSOL, METERED RESPIRATORY (INHALATION)
Qty: 18 G | Refills: 11 | Status: SHIPPED | OUTPATIENT
Start: 2023-10-16

## 2023-10-16 NOTE — PROGRESS NOTES
The ABCs of the Annual Wellness Visit  Subsequent Medicare Wellness Visit    Subjective      Leonard Best is a 73 y.o. female who presents for a Subsequent Medicare Wellness Visit.    The following portions of the patient's history were reviewed and   updated as appropriate: allergies, current medications, past family history, past medical history, past social history, past surgical history, and problem list.    Compared to one year ago, the patient feels her physical   health is better.    Compared to one year ago, the patient feels her mental   health is better.    Recent Hospitalizations:  She was admitted within the past 365 days at [facility] renal stones [facility].       Current Medical Providers:  Patient Care Team:  Felicia Gloria PA-C as PCP - General  Felicia Gloria PA-C as PCP - Family Medicine  Kvng Saldivar MD as Consulting Physician (Nephrology)  Murtaza Woods MD as Consulting Physician (Cardiology)  Denis Worley MD as Consulting Physician (Urology)    Outpatient Medications Prior to Visit   Medication Sig Dispense Refill    albuterol sulfate  (90 Base) MCG/ACT inhaler Inhale 2 puffs Every 2 (Two) Hours As Needed for Wheezing or Shortness of Air. 18 g 11    amLODIPine (NORVASC) 5 MG tablet TAKE 1 TABLET BY MOUTH ONCE DAILY FOR BLOOD PRESSURE (APPOINTMENT) 15 tablet 0    Cholecalciferol (Vitamin D3) 1.25 MG (31536 UT) capsule Take 1 capsule by mouth once a week 5 capsule 0    Cyanocobalamin 1000 MCG capsule 1 PO daily 90 capsule 3    folic acid (FOLVITE) 1 MG tablet Take 1 tablet by mouth Daily. 90 tablet 3    levothyroxine (Euthyrox) 25 MCG tablet Take 1 tablet by mouth Daily. Except Sunday for thyroid 90 tablet 3    metoprolol succinate XL (TOPROL-XL) 25 MG 24 hr tablet Take 1 tablet by mouth Daily. For heart rate 90 tablet 3    rosuvastatin (CRESTOR) 20 MG tablet Take 1 tablet by mouth Daily. for cholesterol 90 tablet 3    vitamin C (VITAMIN C) 1000 MG tablet Take 1 tablet  "by mouth Daily. 30 tablet 0     No facility-administered medications prior to visit.       No opioid medication identified on active medication list. I have reviewed chart for other potential  high risk medication/s and harmful drug interactions in the elderly.        Aspirin is not on active medication list.  Aspirin use is not indicated based on review of current medical condition/s. Risk of harm outweighs potential benefits.  .    Patient Active Problem List   Diagnosis    Hypertension    Right knee pain    Hyperlipidemia    Osteoarthritis, multiple sites    Primary osteoarthritis of right knee    Vitamin D deficiency    PSVT (paroxysmal supraventricular tachycardia)    Hypothyroidism, acquired    Folic acid deficiency    Low serum vitamin B12    Acute kidney failure    Hydronephrosis    Kidney stone    CKD (chronic kidney disease) stage 4, GFR 15-29 ml/min    Pneumonia due to COVID-19 virus    Nausea & vomiting    Sepsis, unspecified organism    Chronic diastolic CHF      Advance Care Planning   Advance Care Planning     Advance Directive is not on file.  ACP discussion was held with the patient during this visit. Patient has an advance directive (not in EMR), copy requested.     Objective    Vitals:    10/16/23 1257   BP: 138/88   Pulse: 88   Resp: 16   Temp: 97.6 °F (36.4 °C)   SpO2: 97%   Weight: 81.6 kg (180 lb)   Height: 167.6 cm (65.98\")   PainSc: 0-No pain     Estimated body mass index is 29.07 kg/m² as calculated from the following:    Height as of this encounter: 167.6 cm (65.98\").    Weight as of this encounter: 81.6 kg (180 lb).    BMI is >= 25 and <30. (Overweight) The following options were offered after discussion;: exercise counseling/recommendations      Does the patient have evidence of cognitive impairment?   No            HEALTH RISK ASSESSMENT    Smoking Status:  Social History     Tobacco Use   Smoking Status Former    Packs/day: 1.00    Years: 20.00    Additional pack years: 0.00    Total " pack years: 20.00    Types: Cigarettes    Quit date: 2015    Years since quittin.6   Smokeless Tobacco Never   Tobacco Comments    CAFFEINE USE     Alcohol Consumption:  Social History     Substance and Sexual Activity   Alcohol Use Yes    Alcohol/week: 1.0 standard drink of alcohol    Types: 1 Glasses of wine per week    Comment: OCCASIONAL     Fall Risk Screen:    DARLENE Fall Risk Assessment was completed, and patient is at LOW risk for falls.Assessment completed on:10/16/2023    Depression Screening:      10/16/2023     1:00 PM   PHQ-2/PHQ-9 Depression Screening   Little Interest or Pleasure in Doing Things 0-->not at all   Feeling Down, Depressed or Hopeless 0-->not at all   PHQ-9: Brief Depression Severity Measure Score 0       Health Habits and Functional and Cognitive Screening:      10/16/2023     1:00 PM   Functional & Cognitive Status   Do you have difficulty preparing food and eating? No   Do you have difficulty bathing yourself, getting dressed or grooming yourself? No   Do you have difficulty using the toilet? No   Do you have difficulty moving around from place to place? No   Do you have trouble with steps or getting out of a bed or a chair? No   Current Diet Well Balanced Diet   Dental Exam Not up to date   Eye Exam Up to date   Exercise (times per week) 0 times per week   Current Exercises Include No Regular Exercise   Do you need help using the phone?  No   Are you deaf or do you have serious difficulty hearing?  No   Do you need help to go to places out of walking distance? No   Do you need help shopping? No   Do you need help preparing meals?  No   Do you need help with housework?  No   Do you need help with laundry? No   Do you need help taking your medications? No   Do you need help managing money? No   Do you ever drive or ride in a car without wearing a seat belt? No   Have you felt unusual stress, anger or loneliness in the last month? No   Who do you live with? Spouse   If you need  help, do you have trouble finding someone available to you? No   Have you been bothered in the last four weeks by sexual problems? No   Do you have difficulty concentrating, remembering or making decisions? No       Age-appropriate Screening Schedule:  Refer to the list below for future screening recommendations based on patient's age, sex and/or medical conditions. Orders for these recommended tests are listed in the plan section. The patient has been provided with a written plan.    Health Maintenance   Topic Date Due    BMI FOLLOWUP  Never done    ZOSTER VACCINE (2 of 2) 09/05/2016    DXA SCAN  07/11/2018    PAP SMEAR  11/21/2018    MAMMOGRAM  02/06/2019    COVID-19 Vaccine (4 - 2023-24 season) 09/01/2023    ANNUAL WELLNESS VISIT  10/05/2023    LIPID PANEL  10/05/2023    TDAP/TD VACCINES (2 - Td or Tdap) 07/11/2026    COLORECTAL CANCER SCREENING  07/11/2026    Pneumococcal Vaccine 65+  Completed    HEPATITIS C SCREENING  Addressed    INFLUENZA VACCINE  Discontinued                  CMS Preventative Services Quick Reference  Risk Factors Identified During Encounter:    Inactivity/Sedentary: Patient was advised to exercise at least 150 minutes a week per CDC recommendations.    The above risks/problems have been discussed with the patient.  Pertinent information has been shared with the patient in the After Visit Summary.    There are no diagnoses linked to this encounter.    Follow Up:   Next Medicare Wellness visit to be scheduled in 1 year.      An After Visit Summary and PPPS were made available to the patient.

## 2023-10-16 NOTE — PROGRESS NOTES
"Subjective   Leonard Best is a 73 y.o. female.     History of Present Illness    Since the last visit, she has overall felt well.  She has Primary Hypertension and well controlled on current medication, Impaired fasting glucose and will monitor labs to watch for DMII, Hyperlipidemia with goals met with current Rx, Hypothyroidism and must update labs to continue treatment, and Vitamin D deficiency and will update labs for continued management.  she has been compliant with current medications have reviewed them.  She has been out of her thyroid levothyroxine for the last 2 weeks.   the patient denies medication side effects.  Will refill medications. /88   Pulse 88   Temp 97.6 °F (36.4 °C)   Resp 16   Ht 167.6 cm (65.98\")   Wt 81.6 kg (180 lb)   LMP  (LMP Unknown)   SpO2 97%   BMI 29.07 kg/m²   Inhaler albuterol works well for COPD as needed  Results for orders placed or performed in visit on 10/05/22   Comprehensive metabolic panel    Specimen: Blood   Result Value Ref Range    Glucose 86 65 - 99 mg/dL    BUN 22 8 - 23 mg/dL    Creatinine 1.10 (H) 0.57 - 1.00 mg/dL    EGFR Result 53.8 (L) >60.0 mL/min/1.73    BUN/Creatinine Ratio 20.0 7.0 - 25.0    Sodium 141 136 - 145 mmol/L    Potassium 4.3 3.5 - 5.2 mmol/L    Chloride 108 (H) 98 - 107 mmol/L    Total CO2 20.7 (L) 22.0 - 29.0 mmol/L    Calcium 10.3 8.6 - 10.5 mg/dL    Total Protein 7.0 6.0 - 8.5 g/dL    Albumin 5.00 3.50 - 5.20 g/dL    Globulin 2.0 gm/dL    A/G Ratio 2.5 g/dL    Total Bilirubin 0.4 0.0 - 1.2 mg/dL    Alkaline Phosphatase 134 (H) 39 - 117 U/L    AST (SGOT) 10 1 - 32 U/L    ALT (SGPT) 9 1 - 33 U/L   Lipid panel    Specimen: Blood   Result Value Ref Range    Total Cholesterol 295 (H) 0 - 200 mg/dL    Triglycerides 312 (H) 0 - 150 mg/dL    HDL Cholesterol 51 40 - 60 mg/dL    VLDL Cholesterol Mark 61 (H) 5 - 40 mg/dL    LDL Chol Calc (NIH) 183 (H) 0 - 100 mg/dL   TSH    Specimen: Blood   Result Value Ref Range    TSH 1.100 0.270 - 4.200 " uIU/mL   T3, Free    Specimen: Blood   Result Value Ref Range    T3, Free 3.0 2.0 - 4.4 pg/mL   T4, Free    Specimen: Blood   Result Value Ref Range    Free T4 1.17 0.93 - 1.70 ng/dL   Vitamin D 25 Hydroxy    Specimen: Blood   Result Value Ref Range    25 Hydroxy, Vitamin D 52.8 30.0 - 100.0 ng/ml   Vitamin B12    Specimen: Blood   Result Value Ref Range    Vitamin B-12 414 211 - 946 pg/mL   Folate    Specimen: Blood   Result Value Ref Range    Folate 8.45 4.78 - 24.20 ng/mL   Microalbumin / Creatinine Urine Ratio - Urine, Clean Catch    Specimen: Urine, Clean Catch   Result Value Ref Range    Creatinine, Urine 122.1 Not Estab. mg/dL    Microalbumin, Urine 75.1 Not Estab. ug/mL    Microalbumin/Creatinine Ratio 62 (H) 0 - 29 mg/g creat   Hemoglobin A1c    Specimen: Blood   Result Value Ref Range    Hemoglobin A1C 5.30 4.80 - 5.60 %   Microscopic Examination -   Result Value Ref Range    WBC, UA See below: (A) /HPF    RBC, UA 6-12 (A) /HPF    Epithelial Cells (non renal) 3-6 (A) /HPF    Cast Type Comment     Bacteria, UA 4+ (A) None Seen /HPF   CBC and Differential    Specimen: Blood   Result Value Ref Range    WBC 11.05 (H) 3.40 - 10.80 10*3/mm3    RBC 5.13 3.77 - 5.28 10*6/mm3    Hemoglobin 16.3 (H) 12.0 - 15.9 g/dL    Hematocrit 49.0 (H) 34.0 - 46.6 %    MCV 95.5 79.0 - 97.0 fL    MCH 31.8 26.6 - 33.0 pg    MCHC 33.3 31.5 - 35.7 g/dL    RDW 12.7 12.3 - 15.4 %    Platelets 288 140 - 450 10*3/mm3    Neutrophil Rel % 63.1 42.7 - 76.0 %    Lymphocyte Rel % 26.1 19.6 - 45.3 %    Monocyte Rel % 6.7 5.0 - 12.0 %    Eosinophil Rel % 2.7 0.3 - 6.2 %    Basophil Rel % 1.1 0.0 - 1.5 %    Neutrophils Absolute 6.98 1.70 - 7.00 10*3/mm3    Lymphocytes Absolute 2.88 0.70 - 3.10 10*3/mm3    Monocytes Absolute 0.74 0.10 - 0.90 10*3/mm3    Eosinophils Absolute 0.30 0.00 - 0.40 10*3/mm3    Basophils Absolute 0.12 0.00 - 0.20 10*3/mm3    Immature Granulocyte Rel % 0.3 0.0 - 0.5 %    Immature Grans Absolute 0.03 0.00 - 0.05 10*3/mm3     nRBC 0.0 0.0 - 0.2 /100 WBC   Urinalysis With Microscopic - Urine, Clean Catch    Specimen: Urine, Clean Catch   Result Value Ref Range    Specific Gravity, UA 1.024 1.005 - 1.030    pH, UA 6.0 5.0 - 8.0    Color, UA Yellow     Appearance, UA Cloudy (A) Clear    Leukocytes, UA See below: (A) Negative    Protein See below: (A) Negative    Glucose, UA Negative Negative    Ketones Negative Negative    Blood, UA Trace (A) Negative    Bilirubin, UA Negative Negative    Urobilinogen, UA Comment     Nitrite, UA Positive (A) Negative   I noted last labs 10/5/2022 with elevation of cholesterol and restarted her Crestor.  Renal function consistent with chronic kidney disease and referred her to Dr. Saldivar.  Hemoglobin A1c was no longer in prediabetes range we will still follow-up and thyroid labs were at goal.  Seen by Dr. Kvng Saldivar nephrology 7/20/2023 stage IIIa chronic kidney disease.  Noted in history of small left kidney per prior notes from urologist.  He saw patient in 2019 when she was admitted for acute renal failure with hydronephrosis due to stones.  He noted creatinine was stable at baseline 1.2-1.4 avoid NSAIDs and ordered follow-up labs and appointment 6 months.  Blood pressure at nephrology visit was 143/87..  She was inpatient for renal stones with Jonathan 4/24/2023 and was discharged on 4/27/2023.  Patient did not see me for hospital follow-up and I note her CBC on 4/27/2023 white count was 19,000 and needs follow-up lab.  Patient has not seen me in a year.  Last saw her on 10/5/2022 noting impaired fasting glucose, hypothyroidism, primary hypertension and restarted amlodipine that visit.  Continued metoprolol tartrate per cardiology to control PSVT.  Noted patient has history of low folic acid and B12 and check labs.  Partial thyroidectomy in past.  Discussion again today on patient compliance must see me every 6 months and has been 11 months since her last visit and will consider terminating patient if  does not comply.   She still needs to f/u with cardio for hx SVT----on Toprol---overdue to see DR Christina....Aortoiliac atherosclerosis----her atherosclerotic changes and her coronary artery calcifications incidentally on CT of abdomen and pelvis 4/25/2023   Off ACE d/t renal labs   Stopped smoking 11yrs ago---20 pk yr hx     Not thyroid cancer  The following portions of the patient's history were reviewed and updated as appropriate: allergies, current medications, past family history, past medical history, past social history, past surgical history, and problem list.    Review of Systems   Constitutional:  Negative for diaphoresis.   HENT:  Negative for nosebleeds and trouble swallowing.    Eyes:  Negative for blurred vision and visual disturbance.   Respiratory:  Negative for choking.    Gastrointestinal:  Negative for blood in stool.   Allergic/Immunologic: Negative for immunocompromised state.   Neurological:  Negative for facial asymmetry and speech difficulty.   Psychiatric/Behavioral:  Negative for self-injury and suicidal ideas.        Objective   Physical Exam  Vitals and nursing note reviewed.   Constitutional:       General: She is not in acute distress.     Appearance: She is well-developed. She is not ill-appearing or toxic-appearing.   HENT:      Head: Normocephalic.      Right Ear: External ear normal.      Left Ear: External ear normal.      Nose: Nose normal.      Mouth/Throat:      Pharynx: Oropharynx is clear.   Eyes:      General: No scleral icterus.     Conjunctiva/sclera: Conjunctivae normal.      Pupils: Pupils are equal, round, and reactive to light.   Neck:      Thyroid: No thyromegaly.   Cardiovascular:      Rate and Rhythm: Normal rate and regular rhythm.      Pulses: Normal pulses.      Heart sounds: Normal heart sounds. No murmur heard.  Pulmonary:      Effort: Pulmonary effort is normal. No respiratory distress.      Breath sounds: Normal breath sounds. No rales.   Musculoskeletal:          General: No deformity. Normal range of motion.      Cervical back: Normal range of motion and neck supple.      Right lower leg: No edema.      Left lower leg: No edema.   Skin:     General: Skin is warm and dry.      Findings: No rash.   Neurological:      General: No focal deficit present.      Mental Status: She is alert and oriented to person, place, and time. Mental status is at baseline.   Psychiatric:         Mood and Affect: Mood normal.         Behavior: Behavior normal.         Thought Content: Thought content normal.         Judgment: Judgment normal.           Assessment & Plan   Diagnoses and all orders for this visit:    1. Primary hypertension (Primary)    2. PSVT (paroxysmal supraventricular tachycardia)  -     Ambulatory Referral to Cardiology    3. Folic acid deficiency    4. Low serum vitamin B12    5. Vitamin D deficiency    6. Post-surgical hypothyroidism    7. Kidney stone    8. Stage 3a chronic kidney disease    9. Coronary artery calcification  Comments:  per CT abd April    10. Smoking history  -     [place] aaa screen limited    Other orders  -     amLODIPine (NORVASC) 5 MG tablet; Take 1 tablet by mouth Daily.  Dispense: 90 tablet; Refill: 1    Consider carotid artery Doppler screening  Patient aware she is eligible for low-dose CT of the chest to wants to wait for right now  Patient declines mammogram screening and bone density screening  Need follow-up on the elevated white blood cell count from her April admission to the hospital  Chronic kidney disease stage III trini sees Dr. Kvng Saldivar every 6 months  Continue folic acid B12 and D we will update labs  Plan, Leonard Munozruder, was seen today.  she was seen for HTN and continue medication, Imparied fasting glucose and plan follow up labs, diet, and exercise, Hypothyroidism and will need to update labs for continued treatment, and Vitamin D deficiency and will update labs .  Continue albuterol as needed for COPD works well

## 2023-10-17 LAB
25(OH)D3+25(OH)D2 SERPL-MCNC: 30.8 NG/ML (ref 30–100)
ALBUMIN SERPL-MCNC: 4.6 G/DL (ref 3.8–4.8)
ALBUMIN/GLOB SERPL: 1.9 {RATIO} (ref 1.2–2.2)
ALP SERPL-CCNC: 110 IU/L (ref 44–121)
ALT SERPL-CCNC: 17 IU/L (ref 0–32)
AST SERPL-CCNC: 20 IU/L (ref 0–40)
BASOPHILS # BLD AUTO: 0.1 X10E3/UL (ref 0–0.2)
BASOPHILS NFR BLD AUTO: 1 %
BILIRUB SERPL-MCNC: 0.2 MG/DL (ref 0–1.2)
BUN SERPL-MCNC: 18 MG/DL (ref 8–27)
BUN/CREAT SERPL: 13 (ref 12–28)
CALCIUM SERPL-MCNC: 9.8 MG/DL (ref 8.7–10.3)
CHLORIDE SERPL-SCNC: 105 MMOL/L (ref 96–106)
CHOLEST SERPL-MCNC: 162 MG/DL (ref 100–199)
CO2 SERPL-SCNC: 20 MMOL/L (ref 20–29)
CREAT SERPL-MCNC: 1.42 MG/DL (ref 0.57–1)
EGFRCR SERPLBLD CKD-EPI 2021: 39 ML/MIN/1.73
EOSINOPHIL # BLD AUTO: 0.5 X10E3/UL (ref 0–0.4)
EOSINOPHIL NFR BLD AUTO: 6 %
ERYTHROCYTE [DISTWIDTH] IN BLOOD BY AUTOMATED COUNT: 12.4 % (ref 11.7–15.4)
FOLATE SERPL-MCNC: 13.7 NG/ML
GLOBULIN SER CALC-MCNC: 2.4 G/DL (ref 1.5–4.5)
GLUCOSE SERPL-MCNC: 84 MG/DL (ref 70–99)
HBA1C MFR BLD: 5.6 % (ref 4.8–5.6)
HCT VFR BLD AUTO: 44.6 % (ref 34–46.6)
HDLC SERPL-MCNC: 63 MG/DL
HGB BLD-MCNC: 15 G/DL (ref 11.1–15.9)
IMM GRANULOCYTES # BLD AUTO: 0 X10E3/UL (ref 0–0.1)
IMM GRANULOCYTES NFR BLD AUTO: 0 %
LDLC SERPL CALC-MCNC: 67 MG/DL (ref 0–99)
LYMPHOCYTES # BLD AUTO: 2.5 X10E3/UL (ref 0.7–3.1)
LYMPHOCYTES NFR BLD AUTO: 29 %
MCH RBC QN AUTO: 32 PG (ref 26.6–33)
MCHC RBC AUTO-ENTMCNC: 33.6 G/DL (ref 31.5–35.7)
MCV RBC AUTO: 95 FL (ref 79–97)
MONOCYTES # BLD AUTO: 0.7 X10E3/UL (ref 0.1–0.9)
MONOCYTES NFR BLD AUTO: 8 %
NEUTROPHILS # BLD AUTO: 4.9 X10E3/UL (ref 1.4–7)
NEUTROPHILS NFR BLD AUTO: 56 %
PLATELET # BLD AUTO: 224 X10E3/UL (ref 150–450)
POTASSIUM SERPL-SCNC: 5.3 MMOL/L (ref 3.5–5.2)
PROT SERPL-MCNC: 7 G/DL (ref 6–8.5)
RBC # BLD AUTO: 4.69 X10E6/UL (ref 3.77–5.28)
SODIUM SERPL-SCNC: 142 MMOL/L (ref 134–144)
T3FREE SERPL-MCNC: 3 PG/ML (ref 2–4.4)
T4 FREE SERPL-MCNC: 1.2 NG/DL (ref 0.82–1.77)
TRIGL SERPL-MCNC: 193 MG/DL (ref 0–149)
TSH SERPL DL<=0.005 MIU/L-ACNC: 0.26 UIU/ML (ref 0.45–4.5)
VIT B12 SERPL-MCNC: 1215 PG/ML (ref 232–1245)
VLDLC SERPL CALC-MCNC: 32 MG/DL (ref 5–40)
WBC # BLD AUTO: 8.7 X10E3/UL (ref 3.4–10.8)

## 2023-10-17 RX ORDER — LEVOTHYROXINE SODIUM 0.03 MG/1
25 TABLET ORAL DAILY
Qty: 90 TABLET | Refills: 3 | Status: SHIPPED | OUTPATIENT
Start: 2023-10-17

## 2024-02-06 ENCOUNTER — OFFICE VISIT (OUTPATIENT)
Dept: CARDIOLOGY | Facility: CLINIC | Age: 74
End: 2024-02-06
Payer: MEDICARE

## 2024-02-06 VITALS
HEART RATE: 67 BPM | WEIGHT: 183 LBS | BODY MASS INDEX: 29.41 KG/M2 | SYSTOLIC BLOOD PRESSURE: 130 MMHG | HEIGHT: 66 IN | DIASTOLIC BLOOD PRESSURE: 78 MMHG

## 2024-02-06 DIAGNOSIS — I47.10 PSVT (PAROXYSMAL SUPRAVENTRICULAR TACHYCARDIA): ICD-10-CM

## 2024-02-06 DIAGNOSIS — I10 PRIMARY HYPERTENSION: ICD-10-CM

## 2024-02-06 DIAGNOSIS — I50.32 CHRONIC DIASTOLIC CHF (CONGESTIVE HEART FAILURE): Primary | Chronic | ICD-10-CM

## 2024-02-08 NOTE — PROGRESS NOTES
"      CARDIOLOGY    Murtaza Woods MD    ENCOUNTER DATE:  02/06/2024    Leonard Best / 73 y.o. / female        CHIEF COMPLAINT / REASON FOR OFFICE VISIT     PSVT (paroxysmal supraventricular tachycardia)      HISTORY OF PRESENT ILLNESS       HPI  Leonard Best is a 73 y.o. female who presents today for consultation.  Seen the patient back in June 2020.  Patient has a history of a PSVT.  Overall she is actually doing relatively well.  She does say about once a month she will feel her heart race but it only lasts for a second or so.  She denies any types of chest discomfort shortness of breath at rest.  She occasionally gets short of breath when she exerts herself.      The following portions of the patient's history were reviewed and updated as appropriate: allergies, current medications, past family history, past medical history, past social history, past surgical history and problem list.      VITAL SIGNS     Visit Vitals  /78 (BP Location: Left arm)   Pulse 67   Ht 167.6 cm (66\")   Wt 83 kg (183 lb)   LMP  (LMP Unknown)   BMI 29.54 kg/m²         Wt Readings from Last 3 Encounters:   02/06/24 83 kg (183 lb)   10/16/23 81.6 kg (180 lb)   10/05/22 76 kg (167 lb 9.6 oz)     Body mass index is 29.54 kg/m².      REVIEW OF SYSTEMS   ROS        PHYSICAL EXAMINATION     Vitals reviewed.   Constitutional:       Appearance: Healthy appearance.   Pulmonary:      Effort: Pulmonary effort is normal.   Cardiovascular:      Normal rate. Regular rhythm. Normal S1. Normal S2.       Murmurs: There is no murmur.      No gallop.  No click. No rub.   Pulses:     Intact distal pulses.   Edema:     Peripheral edema absent.   Neurological:      Mental Status: Alert.           REVIEWED DATA       ECG 12 Lead    Date/Time: 2/6/2024 10:04 AM  Performed by: Murtaza Woods MD    Authorized by: Murtaza Woods MD  Comparison: compared with previous ECG from 2/6/2024  Similar to previous ECG  Rhythm: sinus " rhythm    Clinical impression: normal ECG          Cardiac Procedures:      Lipid Panel          10/16/2023    14:21   Lipid Panel   Total Cholesterol 162    Triglycerides 193    HDL Cholesterol 63    VLDL Cholesterol 32    LDL Cholesterol  67          ASSESSMENT & PLAN      Diagnosis Plan   1. Chronic diastolic CHF         2. PSVT (paroxysmal supraventricular tachycardia)        3. Primary hypertension              SUMMARY/DISCUSSION  Hypertension.  Blood pressures good  History of PSVT.  Very limited symptoms would continue the same.  History of diastolic heart failure.  Encouraged her to exercise I think it would help with some of her shortness of breath with exertion.  Follow-up 1 to 2 years sooner if issues occur.        MEDICATIONS         Discharge Medications            Accurate as of February 6, 2024 11:59 PM. If you have any questions, ask your nurse or doctor.                Continue These Medications        Instructions Start Date   albuterol sulfate  (90 Base) MCG/ACT inhaler  Commonly known as: PROVENTIL HFA;VENTOLIN HFA;PROAIR HFA   2 puffs, Inhalation, Every 2 Hours PRN      amLODIPine 5 MG tablet  Commonly known as: NORVASC   5 mg, Oral, Daily      ascorbic acid 1000 MG tablet  Commonly known as: VITAMIN C   1,000 mg, Oral, Daily      Cyanocobalamin 1000 MCG capsule   1 PO daily      folic acid 1 MG tablet  Commonly known as: FOLVITE   1,000 mcg, Oral, Daily      levothyroxine 25 MCG tablet  Commonly known as: Euthyrox   25 mcg, Oral, Daily, Except Sunday for thyroid      metoprolol succinate XL 25 MG 24 hr tablet  Commonly known as: TOPROL-XL   25 mg, Oral, Daily, For heart rate      rosuvastatin 20 MG tablet  Commonly known as: CRESTOR   20 mg, Oral, Daily, for cholesterol      Vitamin D3 1.25 MG (29123 UT) capsule   Take 1 capsule by mouth once a week                   **Dragon Disclaimer:   Much of this encounter note is an electronic transcription/translation of spoken language to  printed text. The electronic translation of spoken language may permit erroneous, or at times, nonsensical words or phrases to be inadvertently transcribed. Although I have reviewed the note for such errors, some may still exist.

## 2024-04-07 RX ORDER — AMLODIPINE BESYLATE 5 MG/1
5 TABLET ORAL DAILY
Qty: 30 TABLET | Refills: 0 | Status: SHIPPED | OUTPATIENT
Start: 2024-04-07

## 2024-05-04 RX ORDER — AMLODIPINE BESYLATE 5 MG/1
5 TABLET ORAL DAILY
Qty: 30 TABLET | Refills: 0 | Status: SHIPPED | OUTPATIENT
Start: 2024-05-04

## 2024-05-31 RX ORDER — AMLODIPINE BESYLATE 5 MG/1
5 TABLET ORAL DAILY
Qty: 30 TABLET | Refills: 0 | Status: SHIPPED | OUTPATIENT
Start: 2024-05-31

## 2024-06-26 RX ORDER — AMLODIPINE BESYLATE 5 MG/1
TABLET ORAL
Qty: 15 TABLET | Refills: 0 | Status: SHIPPED | OUTPATIENT
Start: 2024-06-26

## 2024-08-05 NOTE — PROGRESS NOTES
Subjective   Leonard Best is a 74 y.o. female.     History of Present Illness    Since the last visit, she has overall felt fairly well.  She has Impaired fasting glucose and will monitor labs to watch for DMII, Hyperlipidemia with goals met with current Rx, Hypothyroidism well controlled on current medication and will continue Rx, Vitamin D deficiency and labs are at goal >30 ng/mL, and primary HTN-----out of Amlodipine x 1 week.  She did log bp and home bp great. 120-130/80 .  she has been compliant with current medications have reviewed them.  The patient denies medication side effects.  Will refill medications. LMP  (LMP Unknown) .      Last visit with me was 10/16/2023.  Has albuterol MDI as needed for COPD related shortness of breath and/or wheezing.  Has postsurgical hypothyroidism and no history of thyroid cancer.  Do want    Stopped smoking 11yrs ago---20 pk yr hx   Leonard Best 74 y.o. female who presents for evaluation of sinus pressure and congestion. Symptoms include congestion, post nasal drip, and cough described as productive of clear sputum.  Onset of symptoms was several months ago, unchanged since that time. Patient denies shortness of breath, wheezing, fever.   Evaluation to date: none Treatment to date:  OTC antihistamines.     Results for orders placed or performed in visit on 10/16/23   Comprehensive metabolic panel    Specimen: Blood   Result Value Ref Range    Glucose 84 70 - 99 mg/dL    BUN 18 8 - 27 mg/dL    Creatinine 1.42 (H) 0.57 - 1.00 mg/dL    EGFR Result 39 (L) >59 mL/min/1.73    BUN/Creatinine Ratio 13 12 - 28    Sodium 142 134 - 144 mmol/L    Potassium 5.3 (H) 3.5 - 5.2 mmol/L    Chloride 105 96 - 106 mmol/L    Total CO2 20 20 - 29 mmol/L    Calcium 9.8 8.7 - 10.3 mg/dL    Total Protein 7.0 6.0 - 8.5 g/dL    Albumin 4.6 3.8 - 4.8 g/dL    Globulin 2.4 1.5 - 4.5 g/dL    A/G Ratio 1.9 1.2 - 2.2    Total Bilirubin 0.2 0.0 - 1.2 mg/dL    Alkaline Phosphatase 110 44 - 121 IU/L     AST (SGOT) 20 0 - 40 IU/L    ALT (SGPT) 17 0 - 32 IU/L   Lipid panel    Specimen: Blood   Result Value Ref Range    Total Cholesterol 162 100 - 199 mg/dL    Triglycerides 193 (H) 0 - 149 mg/dL    HDL Cholesterol 63 >39 mg/dL    VLDL Cholesterol Mark 32 5 - 40 mg/dL    LDL Chol Calc (NIH) 67 0 - 99 mg/dL   TSH    Specimen: Blood   Result Value Ref Range    TSH 0.259 (L) 0.450 - 4.500 uIU/mL   Hemoglobin A1c    Specimen: Blood   Result Value Ref Range    Hemoglobin A1C 5.6 4.8 - 5.6 %   T4, Free    Specimen: Blood   Result Value Ref Range    Free T4 1.20 0.82 - 1.77 ng/dL   T3, Free    Specimen: Blood   Result Value Ref Range    T3, Free 3.0 2.0 - 4.4 pg/mL   Vitamin D,25-Hydroxy    Specimen: Blood   Result Value Ref Range    25 Hydroxy, Vitamin D 30.8 30.0 - 100.0 ng/mL   Vitamin B12    Specimen: Blood   Result Value Ref Range    Vitamin B-12 1,215 232 - 1,245 pg/mL   Folate    Specimen: Blood   Result Value Ref Range    Folate 13.7 >3.0 ng/mL   CBC and Differential    Specimen: Blood   Result Value Ref Range    WBC 8.7 3.4 - 10.8 x10E3/uL    RBC 4.69 3.77 - 5.28 x10E6/uL    Hemoglobin 15.0 11.1 - 15.9 g/dL    Hematocrit 44.6 34.0 - 46.6 %    MCV 95 79 - 97 fL    MCH 32.0 26.6 - 33.0 pg    MCHC 33.6 31.5 - 35.7 g/dL    RDW 12.4 11.7 - 15.4 %    Platelets 224 150 - 450 x10E3/uL    Neutrophil Rel % 56 Not Estab. %    Lymphocyte Rel % 29 Not Estab. %    Monocyte Rel % 8 Not Estab. %    Eosinophil Rel % 6 Not Estab. %    Basophil Rel % 1 Not Estab. %    Neutrophils Absolute 4.9 1.4 - 7.0 x10E3/uL    Lymphocytes Absolute 2.5 0.7 - 3.1 x10E3/uL    Monocytes Absolute 0.7 0.1 - 0.9 x10E3/uL    Eosinophils Absolute 0.5 (H) 0.0 - 0.4 x10E3/uL    Basophils Absolute 0.1 0.0 - 0.2 x10E3/uL    Immature Granulocyte Rel % 0 Not Estab. %    Immature Grans Absolute 0.0 0.0 - 0.1 x10E3/uL     10/17/2023  7:16 AM EDT       Concerned that your kidney functions have progressed.... Your potassium is borderline elevated and advise follow-up  with Dr. Kvng Saldivar, nephrology.  LDL cholesterol is at goal.  TSH is slightly suppressed but your free T3 and free T4 are at goal.  Continue same dose and will send refills levothyroxine.  Hemoglobin A1c is in normal range, triglycerides are much improved.  White count is back in normal range.   On supplement folic acid and B12.   Saw her urologist 5-29-24, Dr Nix.  He noted her KUB x-ray was negative no current stones and follow-up 1 year  Was seen by nephrology following up on chronic kidney disease stage IIIa on 7-11-24 and noted her most recent lab with EGFR at 38----also noting her history of acute renal failure in the past from hydronephrosis from renal stones and has required urologic surgery including cystoscopy with stent and lithotripsy in past.  Vitamin D once weekly at January visit.  Ordered follow-up labs for next appointment in 6 months.  Avoid NSAIDs and IV contrast with follow-up 6 months also low-salt diet.  Had follow-up with cardiology Dr. Woods 2-6-24 for history of PSVT.  And noted her history of chronic diastolic CHF.  Blood pressure was noted controlled.  Encouraged her to exercise due to complaint of shortness of breath felt to be deconditioning.  Follow-up 1 to 2 years with cardiology.    Est CC 47  The following portions of the patient's history were reviewed and updated as appropriate: allergies, current medications, past family history, past medical history, past social history, past surgical history, and problem list.    Review of Systems   Constitutional:  Negative for diaphoresis.   HENT:  Negative for nosebleeds and trouble swallowing.    Eyes:  Negative for blurred vision and visual disturbance.   Respiratory:  Negative for choking.    Gastrointestinal:  Negative for blood in stool.   Allergic/Immunologic: Negative for immunocompromised state.   Neurological:  Negative for facial asymmetry and speech difficulty.   Psychiatric/Behavioral:  Negative for self-injury and  suicidal ideas.        Objective   Physical Exam  Vitals and nursing note reviewed.   Constitutional:       General: She is not in acute distress.     Appearance: She is well-developed. She is not ill-appearing, toxic-appearing or diaphoretic.   HENT:      Head: Normocephalic and atraumatic. Hair is normal.      Right Ear: Ear canal and external ear normal. No drainage, swelling or tenderness. Tympanic membrane is retracted.      Left Ear: Ear canal and external ear normal. No drainage, swelling or tenderness. Tympanic membrane is retracted.      Ears:      Comments: Ear fluid     Nose: Nose normal.      Mouth/Throat:      Mouth: No oral lesions.      Pharynx: Oropharynx is clear. Uvula midline. Posterior oropharyngeal erythema present. No oropharyngeal exudate or uvula swelling.   Eyes:      General: No scleral icterus.        Right eye: No discharge.         Left eye: No discharge.      Conjunctiva/sclera: Conjunctivae normal.      Pupils: Pupils are equal, round, and reactive to light.   Neck:      Thyroid: No thyromegaly.      Vascular: No carotid bruit.   Cardiovascular:      Rate and Rhythm: Normal rate and regular rhythm.      Pulses: Normal pulses.      Heart sounds: Normal heart sounds. No murmur heard.     No gallop.   Pulmonary:      Effort: Pulmonary effort is normal. No respiratory distress.      Breath sounds: Normal breath sounds. No stridor. No wheezing or rales.   Chest:      Chest wall: No tenderness.   Abdominal:      Palpations: Abdomen is soft.      Tenderness: There is no abdominal tenderness.   Musculoskeletal:         General: No deformity. Normal range of motion.      Cervical back: Normal range of motion and neck supple.      Right lower leg: No edema.      Left lower leg: No edema.   Lymphadenopathy:      Cervical: No cervical adenopathy.   Skin:     General: Skin is warm and dry.      Findings: No rash.   Neurological:      General: No focal deficit present.      Mental Status: She is  alert and oriented to person, place, and time. Mental status is at baseline.      Motor: No abnormal muscle tone.   Psychiatric:         Mood and Affect: Mood normal.         Behavior: Behavior normal.         Thought Content: Thought content normal.         Judgment: Judgment normal.           Assessment & Plan   Diagnoses and all orders for this visit:    1. Primary hypertension (Primary)  -     Comprehensive metabolic panel; Future  -     Lipid panel; Future  -     CBC and Differential; Future  -     TSH; Future  -     Hemoglobin A1c; Future  -     T3, Free; Future  -     T4, Free; Future  -     Urinalysis With Microscopic - Urine, Clean Catch; Future  -     Vitamin B12; Future  -     Folate; Future  -     Vitamin D,25-Hydroxy; Future    2. Chronic diastolic CHF   -     Comprehensive metabolic panel; Future  -     Lipid panel; Future  -     CBC and Differential; Future  -     TSH; Future  -     Hemoglobin A1c; Future  -     T3, Free; Future  -     T4, Free; Future  -     Urinalysis With Microscopic - Urine, Clean Catch; Future  -     Vitamin B12; Future  -     Folate; Future  -     Vitamin D,25-Hydroxy; Future    3. Vitamin D deficiency  -     Comprehensive metabolic panel; Future  -     Lipid panel; Future  -     CBC and Differential; Future  -     TSH; Future  -     Hemoglobin A1c; Future  -     T3, Free; Future  -     T4, Free; Future  -     Urinalysis With Microscopic - Urine, Clean Catch; Future  -     Vitamin B12; Future  -     Folate; Future  -     Vitamin D,25-Hydroxy; Future    4. Folic acid deficiency  -     Comprehensive metabolic panel; Future  -     Lipid panel; Future  -     CBC and Differential; Future  -     TSH; Future  -     Hemoglobin A1c; Future  -     T3, Free; Future  -     T4, Free; Future  -     Urinalysis With Microscopic - Urine, Clean Catch; Future  -     Vitamin B12; Future  -     Folate; Future  -     Vitamin D,25-Hydroxy; Future    5. Low serum vitamin B12  -     Comprehensive  metabolic panel; Future  -     Lipid panel; Future  -     CBC and Differential; Future  -     TSH; Future  -     Hemoglobin A1c; Future  -     T3, Free; Future  -     T4, Free; Future  -     Urinalysis With Microscopic - Urine, Clean Catch; Future  -     Vitamin B12; Future  -     Folate; Future  -     Vitamin D,25-Hydroxy; Future    6. Mixed hyperlipidemia  -     Comprehensive metabolic panel; Future  -     Lipid panel; Future  -     CBC and Differential; Future  -     TSH; Future  -     Hemoglobin A1c; Future  -     T3, Free; Future  -     T4, Free; Future  -     Urinalysis With Microscopic - Urine, Clean Catch; Future  -     Vitamin B12; Future  -     Folate; Future  -     Vitamin D,25-Hydroxy; Future    7. Primary osteoarthritis of right knee  -     Comprehensive metabolic panel; Future  -     Lipid panel; Future  -     CBC and Differential; Future  -     TSH; Future  -     Hemoglobin A1c; Future  -     T3, Free; Future  -     T4, Free; Future  -     Urinalysis With Microscopic - Urine, Clean Catch; Future  -     Vitamin B12; Future  -     Folate; Future  -     Vitamin D,25-Hydroxy; Future    8. Stage 3a chronic kidney disease  -     Comprehensive metabolic panel; Future  -     Lipid panel; Future  -     CBC and Differential; Future  -     TSH; Future  -     Hemoglobin A1c; Future  -     T3, Free; Future  -     T4, Free; Future  -     Urinalysis With Microscopic - Urine, Clean Catch; Future  -     Vitamin B12; Future  -     Folate; Future  -     Vitamin D,25-Hydroxy; Future    9. PSVT (paroxysmal supraventricular tachycardia)  Comments:  per cardiology ---Dr Woods surveillance  Orders:  -     Comprehensive metabolic panel; Future  -     Lipid panel; Future  -     CBC and Differential; Future  -     TSH; Future  -     Hemoglobin A1c; Future  -     T3, Free; Future  -     T4, Free; Future  -     Urinalysis With Microscopic - Urine, Clean Catch; Future  -     Vitamin B12; Future  -     Folate; Future  -      Vitamin D,25-Hydroxy; Future    10. Impaired fasting glucose  -     Comprehensive metabolic panel; Future  -     Lipid panel; Future  -     CBC and Differential; Future  -     TSH; Future  -     Hemoglobin A1c; Future  -     T3, Free; Future  -     T4, Free; Future  -     Urinalysis With Microscopic - Urine, Clean Catch; Future  -     Vitamin B12; Future  -     Folate; Future  -     Vitamin D,25-Hydroxy; Future    Other orders  -     amLODIPine (NORVASC) 5 MG tablet; Take 1 tablet by mouth Daily. For BP  Dispense: 90 tablet; Refill: 1  -     rosuvastatin (CRESTOR) 20 MG tablet; Take 1 tablet by mouth Daily. for cholesterol  Dispense: 90 tablet; Refill: 3  -     metoprolol succinate XL (TOPROL-XL) 25 MG 24 hr tablet; Take 1 tablet by mouth Daily. For heart rate  Dispense: 90 tablet; Refill: 3  -     levothyroxine (Euthyrox) 25 MCG tablet; Take 1 tablet by mouth Daily. Except Sunday for thyroid  Dispense: 90 tablet; Refill: 3  -     folic acid (FOLVITE) 1 MG tablet; Take 1 tablet by mouth Daily.  Dispense: 90 tablet; Refill: 3  -     Cyanocobalamin 1000 MCG capsule; 1 PO daily  Dispense: 90 capsule; Refill: 3      Plan, Leonard Best, was seen today.  she was seen for HTN and continue medication, Imparied fasting glucose and plan follow up labs, diet, and exercise, Hyperlipidemia and will continue current medication, Hypothyroidism well controlled, continue medication, and Vitamin D deficiency and supplemented.  Blood pressure was a bit elevated today she has been out of her amlodipine for over a week and home readings that she brought in are at goal and will continue same dose  Also continue on vitamin D, folic acid, B12 and will update levels in October  Chronic kidney disease stage IIIa monitored by nephrology Dr. Kvng Saldivar has follow-up appointment every 6 months  Sees cardiology Dr. Woods for history of diastolic heart failure and PSVT noting stable and follow-up with him in 1 to 2 years per last visit  notes.  Advise she consider carotid Doppler screening and low-dose CT of the chest to screen for lung cancer due to her smoking history, she needs to schedule the AAA screening due to her smoking history.  I placed order in October.  Also advise yearly mammogram and bone density screening every 2 years--declines  Declines low dose CT chest   Saw her urologist 5-29-24, Dr Nix.  He noted her KUB x-ray was negative no current stones and follow-up 1 year

## 2024-08-06 ENCOUNTER — OFFICE VISIT (OUTPATIENT)
Dept: FAMILY MEDICINE CLINIC | Facility: CLINIC | Age: 74
End: 2024-08-06
Payer: MEDICARE

## 2024-08-06 VITALS
WEIGHT: 184.8 LBS | BODY MASS INDEX: 29.7 KG/M2 | RESPIRATION RATE: 17 BRPM | DIASTOLIC BLOOD PRESSURE: 86 MMHG | OXYGEN SATURATION: 95 % | HEIGHT: 66 IN | HEART RATE: 84 BPM | TEMPERATURE: 97.9 F | SYSTOLIC BLOOD PRESSURE: 149 MMHG

## 2024-08-06 DIAGNOSIS — N18.31 STAGE 3A CHRONIC KIDNEY DISEASE: Chronic | ICD-10-CM

## 2024-08-06 DIAGNOSIS — I47.10 PSVT (PAROXYSMAL SUPRAVENTRICULAR TACHYCARDIA): Chronic | ICD-10-CM

## 2024-08-06 DIAGNOSIS — E89.0 POST-SURGICAL HYPOTHYROIDISM: Chronic | ICD-10-CM

## 2024-08-06 DIAGNOSIS — M17.11 PRIMARY OSTEOARTHRITIS OF RIGHT KNEE: Chronic | ICD-10-CM

## 2024-08-06 DIAGNOSIS — E53.8 LOW SERUM VITAMIN B12: Chronic | ICD-10-CM

## 2024-08-06 DIAGNOSIS — R73.01 IMPAIRED FASTING GLUCOSE: Chronic | ICD-10-CM

## 2024-08-06 DIAGNOSIS — I10 PRIMARY HYPERTENSION: Primary | Chronic | ICD-10-CM

## 2024-08-06 DIAGNOSIS — E53.8 FOLIC ACID DEFICIENCY: Chronic | ICD-10-CM

## 2024-08-06 DIAGNOSIS — J01.90 ACUTE SINUSITIS, RECURRENCE NOT SPECIFIED, UNSPECIFIED LOCATION: ICD-10-CM

## 2024-08-06 DIAGNOSIS — E78.2 MIXED HYPERLIPIDEMIA: Chronic | ICD-10-CM

## 2024-08-06 DIAGNOSIS — I50.32 CHRONIC DIASTOLIC CHF (CONGESTIVE HEART FAILURE): Chronic | ICD-10-CM

## 2024-08-06 DIAGNOSIS — E55.9 VITAMIN D DEFICIENCY: Chronic | ICD-10-CM

## 2024-08-06 RX ORDER — ROSUVASTATIN CALCIUM 20 MG/1
20 TABLET, COATED ORAL DAILY
Qty: 90 TABLET | Refills: 3 | Status: SHIPPED | OUTPATIENT
Start: 2024-08-06

## 2024-08-06 RX ORDER — AMLODIPINE BESYLATE 5 MG/1
5 TABLET ORAL DAILY
Qty: 90 TABLET | Refills: 1 | Status: SHIPPED | OUTPATIENT
Start: 2024-08-06

## 2024-08-06 RX ORDER — FOLIC ACID 1 MG/1
1000 TABLET ORAL DAILY
Qty: 90 TABLET | Refills: 3 | Status: SHIPPED | OUTPATIENT
Start: 2024-08-06

## 2024-08-06 RX ORDER — AMOXICILLIN AND CLAVULANATE POTASSIUM 875; 125 MG/1; MG/1
1 TABLET, FILM COATED ORAL EVERY 12 HOURS SCHEDULED
Qty: 20 TABLET | Refills: 0 | Status: SHIPPED | OUTPATIENT
Start: 2024-08-06

## 2024-08-06 RX ORDER — LEVOTHYROXINE SODIUM 0.03 MG/1
25 TABLET ORAL DAILY
Qty: 90 TABLET | Refills: 3 | Status: SHIPPED | OUTPATIENT
Start: 2024-08-06

## 2024-08-06 RX ORDER — METOPROLOL SUCCINATE 25 MG/1
25 TABLET, EXTENDED RELEASE ORAL DAILY
Qty: 90 TABLET | Refills: 3 | Status: SHIPPED | OUTPATIENT
Start: 2024-08-06

## 2024-08-06 NOTE — PATIENT INSTRUCTIONS
"Hypertension, Adult  High blood pressure (hypertension) is when the force of blood pumping through the arteries is too strong. The arteries are the blood vessels that carry blood from the heart throughout the body. Hypertension forces the heart to work harder to pump blood and may cause arteries to become narrow or stiff. Untreated or uncontrolled hypertension can lead to a heart attack, heart failure, a stroke, kidney disease, and other problems.  A blood pressure reading consists of a higher number over a lower number. Ideally, your blood pressure should be below 120/80. The first (\"top\") number is called the systolic pressure. It is a measure of the pressure in your arteries as your heart beats. The second (\"bottom\") number is called the diastolic pressure. It is a measure of the pressure in your arteries as the heart relaxes.  What are the causes?  The exact cause of this condition is not known. There are some conditions that result in high blood pressure.  What increases the risk?  Certain factors may make you more likely to develop high blood pressure. Some of these risk factors are under your control, including:  Smoking.  Not getting enough exercise or physical activity.  Being overweight.  Having too much fat, sugar, calories, or salt (sodium) in your diet.  Drinking too much alcohol.  Other risk factors include:  Having a personal history of heart disease, diabetes, high cholesterol, or kidney disease.  Stress.  Having a family history of high blood pressure and high cholesterol.  Having obstructive sleep apnea.  Age. The risk increases with age.  What are the signs or symptoms?  High blood pressure may not cause symptoms. Very high blood pressure (hypertensive crisis) may cause:  Headache.  Fast or irregular heartbeats (palpitations).  Shortness of breath.  Nosebleed.  Nausea and vomiting.  Vision changes.  Severe chest pain, dizziness, and seizures.  How is this diagnosed?  This condition is diagnosed by " measuring your blood pressure while you are seated, with your arm resting on a flat surface, your legs uncrossed, and your feet flat on the floor. The cuff of the blood pressure monitor will be placed directly against the skin of your upper arm at the level of your heart. Blood pressure should be measured at least twice using the same arm. Certain conditions can cause a difference in blood pressure between your right and left arms.  If you have a high blood pressure reading during one visit or you have normal blood pressure with other risk factors, you may be asked to:  Return on a different day to have your blood pressure checked again.  Monitor your blood pressure at home for 1 week or longer.  If you are diagnosed with hypertension, you may have other blood or imaging tests to help your health care provider understand your overall risk for other conditions.  How is this treated?  This condition is treated by making healthy lifestyle changes, such as eating healthy foods, exercising more, and reducing your alcohol intake. You may be referred for counseling on a healthy diet and physical activity.  Your health care provider may prescribe medicine if lifestyle changes are not enough to get your blood pressure under control and if:  Your systolic blood pressure is above 130.  Your diastolic blood pressure is above 80.  Your personal target blood pressure may vary depending on your medical conditions, your age, and other factors.  Follow these instructions at home:  Eating and drinking    Eat a diet that is high in fiber and potassium, and low in sodium, added sugar, and fat. An example of this eating plan is called the DASH diet. DASH stands for Dietary Approaches to Stop Hypertension. To eat this way:  Eat plenty of fresh fruits and vegetables. Try to fill one half of your plate at each meal with fruits and vegetables.  Eat whole grains, such as whole-wheat pasta, brown rice, or whole-grain bread. Fill about one  fourth of your plate with whole grains.  Eat or drink low-fat dairy products, such as skim milk or low-fat yogurt.  Avoid fatty cuts of meat, processed or cured meats, and poultry with skin. Fill about one fourth of your plate with lean proteins, such as fish, chicken without skin, beans, eggs, or tofu.  Avoid pre-made and processed foods. These tend to be higher in sodium, added sugar, and fat.  Reduce your daily sodium intake. Many people with hypertension should eat less than 1,500 mg of sodium a day.  Do not drink alcohol if:  Your health care provider tells you not to drink.  You are pregnant, may be pregnant, or are planning to become pregnant.  If you drink alcohol:  Limit how much you have to:  0-1 drink a day for women.  0-2 drinks a day for men.  Know how much alcohol is in your drink. In the [place] one drink equals one 12 oz bottle of beer (355 mL), one 5 oz glass of wine (148 mL), or one 1½ oz glass of hard liquor (44 mL).  Lifestyle    Work with your health care provider to maintain a healthy body weight or to lose weight. Ask what an ideal weight is for you.  Get at least 30 minutes of exercise that causes your heart to beat faster (aerobic exercise) most days of the week. Activities may include walking, swimming, or biking.  Include exercise to strengthen your muscles (resistance exercise), such as Pilates or lifting weights, as part of your weekly exercise routine. Try to do these types of exercises for 30 minutes at least 3 days a week.  Do not use any products that contain nicotine or tobacco. These products include cigarettes, chewing tobacco, and vaping devices, such as e-cigarettes. If you need help quitting, ask your health care provider.  Monitor your blood pressure at home as told by your health care provider.  Keep all follow-up visits. This is important.  Medicines  Take over-the-counter and prescription medicines only as told by your health care provider. Follow directions carefully. Blood  pressure medicines must be taken as prescribed.  Do not skip doses of blood pressure medicine. Doing this puts you at risk for problems and can make the medicine less effective.  Ask your health care provider about side effects or reactions to medicines that you should watch for.  Contact a health care provider if you:  Think you are having a reaction to a medicine you are taking.  Have headaches that keep coming back (recurring).  Feel dizzy.  Have swelling in your ankles.  Have trouble with your vision.  Get help right away if you:  Develop a severe headache or confusion.  Have unusual weakness or numbness.  Feel faint.  Have severe pain in your chest or abdomen.  Vomit repeatedly.  Have trouble breathing.  These symptoms may be an emergency. Get help right away. Call 911.  Do not wait to see if the symptoms will go away.  Do not drive yourself to the hospital.  Summary  Hypertension is when the force of blood pumping through your arteries is too strong. If this condition is not controlled, it may put you at risk for serious complications.  Your personal target blood pressure may vary depending on your medical conditions, your age, and other factors. For most people, a normal blood pressure is less than 120/80.  Hypertension is treated with lifestyle changes, medicines, or a combination of both. Lifestyle changes include losing weight, eating a healthy, low-sodium diet, exercising more, and limiting alcohol.  This information is not intended to replace advice given to you by your health care provider. Make sure you discuss any questions you have with your health care provider.  Document Revised: 10/25/2022 Document Reviewed: 10/25/2022  Elsevier Patient Education © 2024 [employer]     Moderate Variability

## 2024-12-27 RX ORDER — AMLODIPINE BESYLATE 5 MG/1
5 TABLET ORAL DAILY
Qty: 90 TABLET | Refills: 0 | Status: SHIPPED | OUTPATIENT
Start: 2024-12-27

## 2025-01-16 RX ORDER — ALBUTEROL SULFATE 90 UG/1
INHALANT RESPIRATORY (INHALATION)
Qty: 9 G | Refills: 0 | Status: SHIPPED | OUTPATIENT
Start: 2025-01-16

## 2025-03-21 RX ORDER — AMLODIPINE BESYLATE 5 MG/1
5 TABLET ORAL DAILY
Qty: 15 TABLET | Refills: 0 | Status: SHIPPED | OUTPATIENT
Start: 2025-03-21

## 2025-06-13 RX ORDER — ALBUTEROL SULFATE 90 UG/1
INHALANT RESPIRATORY (INHALATION)
Qty: 9 G | Refills: 0 | Status: SHIPPED | OUTPATIENT
Start: 2025-06-13

## 2025-07-08 RX ORDER — METOPROLOL SUCCINATE 25 MG/1
TABLET, EXTENDED RELEASE ORAL
Qty: 90 TABLET | Refills: 0 | Status: SHIPPED | OUTPATIENT
Start: 2025-07-08 | End: 2025-07-14 | Stop reason: SDUPTHER

## 2025-07-14 ENCOUNTER — TELEPHONE (OUTPATIENT)
Dept: FAMILY MEDICINE CLINIC | Facility: CLINIC | Age: 75
End: 2025-07-14

## 2025-07-14 ENCOUNTER — OFFICE VISIT (OUTPATIENT)
Dept: FAMILY MEDICINE CLINIC | Facility: CLINIC | Age: 75
End: 2025-07-14
Payer: MEDICARE

## 2025-07-14 ENCOUNTER — RESULTS FOLLOW-UP (OUTPATIENT)
Dept: FAMILY MEDICINE CLINIC | Facility: CLINIC | Age: 75
End: 2025-07-14

## 2025-07-14 VITALS
OXYGEN SATURATION: 95 % | BODY MASS INDEX: 28.93 KG/M2 | SYSTOLIC BLOOD PRESSURE: 186 MMHG | TEMPERATURE: 97.4 F | WEIGHT: 180 LBS | RESPIRATION RATE: 16 BRPM | HEART RATE: 56 BPM | HEIGHT: 66 IN | DIASTOLIC BLOOD PRESSURE: 100 MMHG

## 2025-07-14 DIAGNOSIS — I47.10 PSVT (PAROXYSMAL SUPRAVENTRICULAR TACHYCARDIA): ICD-10-CM

## 2025-07-14 DIAGNOSIS — E53.8 LOW SERUM VITAMIN B12: ICD-10-CM

## 2025-07-14 DIAGNOSIS — I10 PRIMARY HYPERTENSION: ICD-10-CM

## 2025-07-14 DIAGNOSIS — E78.2 MIXED HYPERLIPIDEMIA: ICD-10-CM

## 2025-07-14 DIAGNOSIS — F17.201 TOBACCO DEPENDENCE IN REMISSION: ICD-10-CM

## 2025-07-14 DIAGNOSIS — E89.0 POST-SURGICAL HYPOTHYROIDISM: ICD-10-CM

## 2025-07-14 DIAGNOSIS — Z23 IMMUNIZATION DUE: ICD-10-CM

## 2025-07-14 DIAGNOSIS — Z87.891 PERSONAL HISTORY OF NICOTINE DEPENDENCE: ICD-10-CM

## 2025-07-14 DIAGNOSIS — N20.0 KIDNEY STONE: ICD-10-CM

## 2025-07-14 DIAGNOSIS — E55.9 VITAMIN D DEFICIENCY: ICD-10-CM

## 2025-07-14 DIAGNOSIS — R73.01 IMPAIRED FASTING GLUCOSE: ICD-10-CM

## 2025-07-14 DIAGNOSIS — J20.9 ACUTE BRONCHITIS DUE TO INFECTION: Primary | ICD-10-CM

## 2025-07-14 DIAGNOSIS — N18.31 STAGE 3A CHRONIC KIDNEY DISEASE: ICD-10-CM

## 2025-07-14 DIAGNOSIS — J44.1 CHRONIC OBSTRUCTIVE PULMONARY DISEASE WITH ACUTE EXACERBATION: ICD-10-CM

## 2025-07-14 PROCEDURE — G0439 PPPS, SUBSEQ VISIT: HCPCS | Performed by: PHYSICIAN ASSISTANT

## 2025-07-14 PROCEDURE — 99214 OFFICE O/P EST MOD 30 MIN: CPT | Performed by: PHYSICIAN ASSISTANT

## 2025-07-14 PROCEDURE — 1126F AMNT PAIN NOTED NONE PRSNT: CPT | Performed by: PHYSICIAN ASSISTANT

## 2025-07-14 PROCEDURE — 3080F DIAST BP >= 90 MM HG: CPT | Performed by: PHYSICIAN ASSISTANT

## 2025-07-14 PROCEDURE — 1160F RVW MEDS BY RX/DR IN RCRD: CPT | Performed by: PHYSICIAN ASSISTANT

## 2025-07-14 PROCEDURE — G0009 ADMIN PNEUMOCOCCAL VACCINE: HCPCS | Performed by: PHYSICIAN ASSISTANT

## 2025-07-14 PROCEDURE — 3077F SYST BP >= 140 MM HG: CPT | Performed by: PHYSICIAN ASSISTANT

## 2025-07-14 PROCEDURE — 90684 PCV21 VACCINE IM: CPT | Performed by: PHYSICIAN ASSISTANT

## 2025-07-14 PROCEDURE — 1159F MED LIST DOCD IN RCRD: CPT | Performed by: PHYSICIAN ASSISTANT

## 2025-07-14 PROCEDURE — 1170F FXNL STATUS ASSESSED: CPT | Performed by: PHYSICIAN ASSISTANT

## 2025-07-14 RX ORDER — FLUTICASONE FUROATE, UMECLIDINIUM BROMIDE AND VILANTEROL TRIFENATATE 100; 62.5; 25 UG/1; UG/1; UG/1
1 POWDER RESPIRATORY (INHALATION)
Qty: 60 EACH | Refills: 11 | Status: SHIPPED | OUTPATIENT
Start: 2025-07-14

## 2025-07-14 RX ORDER — ROSUVASTATIN CALCIUM 20 MG/1
20 TABLET, COATED ORAL DAILY
Qty: 90 TABLET | Refills: 3 | Status: SHIPPED | OUTPATIENT
Start: 2025-07-14

## 2025-07-14 RX ORDER — METHYLPREDNISOLONE 4 MG/1
TABLET ORAL
Qty: 21 TABLET | Refills: 0 | Status: SHIPPED | OUTPATIENT
Start: 2025-07-14

## 2025-07-14 RX ORDER — LEVOTHYROXINE SODIUM 25 UG/1
25 TABLET ORAL DAILY
Qty: 90 TABLET | Refills: 3 | Status: SHIPPED | OUTPATIENT
Start: 2025-07-14

## 2025-07-14 RX ORDER — METOPROLOL SUCCINATE 25 MG/1
25 TABLET, EXTENDED RELEASE ORAL DAILY
Qty: 90 TABLET | Refills: 1 | Status: SHIPPED | OUTPATIENT
Start: 2025-07-14

## 2025-07-14 RX ORDER — AMLODIPINE BESYLATE 5 MG/1
5 TABLET ORAL DAILY
Qty: 90 TABLET | Refills: 1 | Status: SHIPPED | OUTPATIENT
Start: 2025-07-14

## 2025-07-14 RX ORDER — AZITHROMYCIN 250 MG/1
TABLET, FILM COATED ORAL
Qty: 6 TABLET | Refills: 1 | Status: SHIPPED | OUTPATIENT
Start: 2025-07-14

## 2025-07-14 RX ORDER — FOLIC ACID 1 MG/1
1000 TABLET ORAL DAILY
Qty: 90 TABLET | Refills: 3 | Status: SHIPPED | OUTPATIENT
Start: 2025-07-14

## 2025-07-14 NOTE — TELEPHONE ENCOUNTER
Caller: Leonard Best    Relationship: Self    Best call back number: [phone]     What is the best time to reach you: ANY    Who are you requesting to speak with (clinical staff, provider,  specific staff member): CLINICAL STAFF    Do you know the name of the person who called: LEONARD     What was the call regarding: PATIENT IS REQUESTING A CALL REGARDING HER INHALER PRESCRIPTION.    Is it okay if the provider responds through MyChart: NO

## 2025-07-14 NOTE — TELEPHONE ENCOUNTER
Pt stated that she was instructed to let you know if the medication was too expensive, pt requesting alternative

## 2025-07-14 NOTE — PROGRESS NOTES
Subjective   The ABCs of the Annual Wellness Visit  Medicare Wellness Visit      Leonard Best is a 75 y.o. patient who presents for a Medicare Wellness Visit.    The following portions of the patient's history were reviewed and   updated as appropriate: allergies, current medications, past family history, past medical history, past social history, past surgical history, and problem list.    Compared to one year ago, the patient's physical   health is better.  Compared to one year ago, the patient's mental   health is better.    Recent Hospitalizations:  She was not admitted to the hospital during the last year.     Current Medical Providers:  Patient Care Team:  Felicia Gloria PA-C as PCP - General (Family Medicine)  Felicia Gloria PA-C as PCP - Family Medicine  Kvng Saldviar MD as Consulting Physician (Nephrology)  Murtaza Woods MD as Consulting Physician (Cardiology)  Jey Nix Jr., MD as Consulting Physician (Urology)    Outpatient Medications Prior to Visit   Medication Sig Dispense Refill    albuterol sulfate  (90 Base) MCG/ACT inhaler INHALE 2 PUFFS BY MOUTH EVERY 2 HOURS AS NEEDED FOR WHEEZING OR SHORTNESS OF BREATH 9 g 0    amLODIPine (NORVASC) 5 MG tablet Take 1 tablet by mouth Daily. for blood pressure--appt 15 tablet 0    Cholecalciferol (Vitamin D3) 1.25 MG (21194 UT) capsule Take 1 capsule by mouth once a week 5 capsule 0    Cyanocobalamin 1000 MCG capsule 1 PO daily 90 capsule 3    folic acid (FOLVITE) 1 MG tablet Take 1 tablet by mouth Daily. 90 tablet 3    levothyroxine (Euthyrox) 25 MCG tablet Take 1 tablet by mouth Daily. Except Sunday for thyroid 90 tablet 3    metoprolol succinate XL (TOPROL-XL) 25 MG 24 hr tablet TAKE 1 TABLET BY MOUTH ONCE DAILY FOR HEART RATE 90 tablet 0    rosuvastatin (CRESTOR) 20 MG tablet Take 1 tablet by mouth Daily. for cholesterol 90 tablet 3    amoxicillin-clavulanate (AUGMENTIN) 875-125 MG per tablet Take 1 tablet by mouth Every 12  "(Twelve) Hours. One PO BID for infection with food (Patient not taking: Reported on 7/14/2025) 20 tablet 0     No facility-administered medications prior to visit.     No opioid medication identified on active medication list. I have reviewed chart for other potential  high risk medication/s and harmful drug interactions in the elderly.      Aspirin is not on active medication list.  Aspirin use is not indicated based on review of current medical condition/s. Risk of harm outweighs potential benefits.  .    Patient Active Problem List   Diagnosis    Hypertension    Right knee pain    Hyperlipidemia    Osteoarthritis, multiple sites    Primary osteoarthritis of right knee    Vitamin D deficiency    PSVT (paroxysmal supraventricular tachycardia)    Folic acid deficiency    Low serum vitamin B12    Acute kidney failure    Hydronephrosis    Kidney stone    CKD (chronic kidney disease) stage 4, GFR 15-29 ml/min    Pneumonia due to COVID-19 virus    Nausea & vomiting    Sepsis, unspecified organism    Chronic diastolic CHF     Post-surgical hypothyroidism     Advance Care Planning Advance Directive is not on file.  ACP discussion was held with the patient during this visit. Patient has an advance directive (not in EMR), copy requested.            Objective   Vitals:    07/14/25 1055   BP: (!) 186/100   Pulse: 56   Resp: 16   Temp: 97.4 °F (36.3 °C)   TempSrc: Oral   SpO2: 95%   Weight: 81.6 kg (180 lb)   Height: 167.6 cm (65.98\")       Estimated body mass index is 29.07 kg/m² as calculated from the following:    Height as of this encounter: 167.6 cm (65.98\").    Weight as of this encounter: 81.6 kg (180 lb).    BMI is >= 25 and <30. (Overweight) The following options were offered after discussion;: weight loss educational material (shared in after visit summary), exercise counseling/recommendations, and nutrition counseling/recommendations           Does the patient have evidence of cognitive impairment? Mini cog 5 out of " 5                                                                                               Health  Risk Assessment    Smoking Status:  Social History     Tobacco Use   Smoking Status Former    Current packs/day: 0.00    Average packs/day: 1 pack/day for 20.0 years (20.0 ttl pk-yrs)    Types: Cigarettes    Start date: 1995    Quit date: 2015    Years since quitting: 10.3    Passive exposure: Never   Smokeless Tobacco Never   Tobacco Comments    CAFFEINE USE     Alcohol Consumption:  Social History     Substance and Sexual Activity   Alcohol Use Yes    Alcohol/week: 1.0 standard drink of alcohol    Types: 1 Glasses of wine per week    Comment: OCCASIONAL       Fall Risk Screen  STEADI Fall Risk Assessment was completed, and patient is at LOW risk for falls.Assessment completed on:2025    Depression Screening   Little interest or pleasure in doing things? Not at all   Feeling down, depressed, or hopeless? Not at all   PHQ-2 Total Score 0      Health Habits and Functional and Cognitive Screenin/14/2025    11:00 AM   Functional & Cognitive Status   Do you have difficulty preparing food and eating? No   Do you have difficulty bathing yourself, getting dressed or grooming yourself? No   Do you have difficulty using the toilet? No   Do you have difficulty moving around from place to place? No   Do you have trouble with steps or getting out of a bed or a chair? No   Current Diet Well Balanced Diet   Dental Exam Not up to date   Eye Exam Not up to date   Exercise (times per week) 0 times per week   Current Exercises Include No Regular Exercise   Do you need help using the phone?  No   Are you deaf or do you have serious difficulty hearing?  No   Do you need help to go to places out of walking distance? No   Do you need help shopping? No   Do you need help preparing meals?  No   Do you need help with housework?  No   Do you need help with laundry? No   Do you need help taking your medications? No   Do  you need help managing money? No   Do you ever drive or ride in a car without wearing a seat belt? No   Have you felt unusual fatigue (could be tiredness), stress, anger or loneliness in the last month? No   Who do you live with? Spouse   If you need help, do you have trouble finding someone available to you? No   Have you been bothered in the last four weeks by sexual problems? No   Do you have difficulty concentrating, remembering or making decisions? No           Age-appropriate Screening Schedule:  Refer to the list below for future screening recommendations based on patient's age, sex and/or medical conditions. Orders for these recommended tests are listed in the plan section. The patient has been provided with a written plan.    Health Maintenance List  Health Maintenance   Topic Date Due    LUNG CANCER SCREENING  Never done    ZOSTER VACCINE (2 of 2) 09/05/2016    DXA SCAN  07/11/2018    MAMMOGRAM  02/06/2020    COVID-19 Vaccine (5 - 2024-25 season) 09/01/2024    LIPID PANEL  10/16/2024    TDAP/TD VACCINES (2 - Td or Tdap) 07/11/2026    COLORECTAL CANCER SCREENING  07/11/2026    ANNUAL WELLNESS VISIT  07/14/2026    Pneumococcal Vaccine 50+  Completed    HEPATITIS C SCREENING  Addressed    INFLUENZA VACCINE  Discontinued                                                                                                                                                CMS Preventative Services Quick Reference  Risk Factors Identified During Encounter  Immunizations Discussed/Encouraged: Capvaxive (Pneumococcal conjugate - PCV21)    The above risks/problems have been discussed with the patient.  Pertinent information has been shared with the patient in the After Visit Summary.  An After Visit Summary and PPPS were made available to the patient.    Follow Up:   Next Medicare Wellness visit to be scheduled in 1 year.     Assessment & Plan         Follow Up:   No follow-ups on file.

## 2025-07-14 NOTE — PROGRESS NOTES
"Subjective   Leonard Best is a 75 y.o. female.     Hypertension  Associated symptoms: shortness of breath    Associated symptoms: no blurred vision    Hyperlipidemia  Associated symptoms include shortness of breath.   Osteoarthritis  Her past medical history is significant for osteoarthritis.   Chronic Kidney Disease  Symptoms: no diaphoresis        Since the last visit, she has overall felt fairly well.  She has Impaired fasting glucose and will monitor labs to watch for DMII and primary hypertension blood pressures not controlled this been out of amlodipine and adding back in nephrology can follow-up on blood pressure Thursday also mixed hyperlipidemia need to update labs to know if goals are met.  Postsurgical hypothyroidism remains on levothyroxine will update labs for thyroid also nephrology monitors vitamin D level.  she has been compliant with current medications have reviewed them.  The patient denies medication side effects.  Will refill medications. BP (!) 186/100   Pulse 56   Temp 97.4 °F (36.3 °C) (Oral)   Resp 16   Ht 167.6 cm (65.98\")   Wt 81.6 kg (180 lb)   LMP  (LMP Unknown)   SpO2 95%   BMI 29.07 kg/m² .  BMI is >= 25 and <30. (Overweight) The following options were offered after discussion;: weight loss educational material (shared in after visit summary), exercise counseling/recommendations, and nutrition counseling/recommendations  X-Ray  Interpretation report in house X-rays that I personally viewed    Relevant Clinical Issues/Diagnoses/Indications: Cough since December and history of COPD some exertional wheezing        Clinical Findings: The prior infiltrate that was on the 12/2/2020 right lower lobe area lung did resolve  Heart size normal, aortic vascular calcifications noted and no definite infiltrate          Comparative Data:  yes          Date of Previous X-ray:  12-2-2020  Change on current X-ray:  yes    Must see me Q 6mos  Results for orders placed or performed in visit on " 10/16/23   Comprehensive metabolic panel    Collection Time: 10/16/23  2:21 PM    Specimen: Blood   Result Value Ref Range    Glucose 84 70 - 99 mg/dL    BUN 18 8 - 27 mg/dL    Creatinine 1.42 (H) 0.57 - 1.00 mg/dL    EGFR Result 39 (L) >59 mL/min/1.73    BUN/Creatinine Ratio 13 12 - 28    Sodium 142 134 - 144 mmol/L    Potassium 5.3 (H) 3.5 - 5.2 mmol/L    Chloride 105 96 - 106 mmol/L    Total CO2 20 20 - 29 mmol/L    Calcium 9.8 8.7 - 10.3 mg/dL    Total Protein 7.0 6.0 - 8.5 g/dL    Albumin 4.6 3.8 - 4.8 g/dL    Globulin 2.4 1.5 - 4.5 g/dL    A/G Ratio 1.9 1.2 - 2.2    Total Bilirubin 0.2 0.0 - 1.2 mg/dL    Alkaline Phosphatase 110 44 - 121 IU/L    AST (SGOT) 20 0 - 40 IU/L    ALT (SGPT) 17 0 - 32 IU/L   Lipid panel    Collection Time: 10/16/23  2:21 PM    Specimen: Blood   Result Value Ref Range    Total Cholesterol 162 100 - 199 mg/dL    Triglycerides 193 (H) 0 - 149 mg/dL    HDL Cholesterol 63 >39 mg/dL    VLDL Cholesterol Mark 32 5 - 40 mg/dL    LDL Chol Calc (NIH) 67 0 - 99 mg/dL   TSH    Collection Time: 10/16/23  2:21 PM    Specimen: Blood   Result Value Ref Range    TSH 0.259 (L) 0.450 - 4.500 uIU/mL   Hemoglobin A1c    Collection Time: 10/16/23  2:21 PM    Specimen: Blood   Result Value Ref Range    Hemoglobin A1C 5.6 4.8 - 5.6 %   T4, Free    Collection Time: 10/16/23  2:21 PM    Specimen: Blood   Result Value Ref Range    Free T4 1.20 0.82 - 1.77 ng/dL   T3, Free    Collection Time: 10/16/23  2:21 PM    Specimen: Blood   Result Value Ref Range    T3, Free 3.0 2.0 - 4.4 pg/mL   Vitamin D,25-Hydroxy    Collection Time: 10/16/23  2:21 PM    Specimen: Blood   Result Value Ref Range    25 Hydroxy, Vitamin D 30.8 30.0 - 100.0 ng/mL   Vitamin B12    Collection Time: 10/16/23  2:21 PM    Specimen: Blood   Result Value Ref Range    Vitamin B-12 1,215 232 - 1,245 pg/mL   Folate    Collection Time: 10/16/23  2:21 PM    Specimen: Blood   Result Value Ref Range    Folate 13.7 >3.0 ng/mL   CBC and Differential     Collection Time: 10/16/23  2:21 PM    Specimen: Blood   Result Value Ref Range    WBC 8.7 3.4 - 10.8 x10E3/uL    RBC 4.69 3.77 - 5.28 x10E6/uL    Hemoglobin 15.0 11.1 - 15.9 g/dL    Hematocrit 44.6 34.0 - 46.6 %    MCV 95 79 - 97 fL    MCH 32.0 26.6 - 33.0 pg    MCHC 33.6 31.5 - 35.7 g/dL    RDW 12.4 11.7 - 15.4 %    Platelets 224 150 - 450 x10E3/uL    Neutrophil Rel % 56 Not Estab. %    Lymphocyte Rel % 29 Not Estab. %    Monocyte Rel % 8 Not Estab. %    Eosinophil Rel % 6 Not Estab. %    Basophil Rel % 1 Not Estab. %    Neutrophils Absolute 4.9 1.4 - 7.0 x10E3/uL    Lymphocytes Absolute 2.5 0.7 - 3.1 x10E3/uL    Monocytes Absolute 0.7 0.1 - 0.9 x10E3/uL    Eosinophils Absolute 0.5 (H) 0.0 - 0.4 x10E3/uL    Basophils Absolute 0.1 0.0 - 0.2 x10E3/uL    Immature Granulocyte Rel % 0 Not Estab. %    Immature Grans Absolute 0.0 0.0 - 0.1 x10E3/uL     Sees derm; Dr Srivastava  Last nephology saw MANOLO Dwyer 1-16-25----CKD 3a    She has white coat  No she has been out of the amlodipine because I have not seen her blood pressure is elevated and I am restarting that and nephrology can follow-up on her blood pressure with Dr. Mackenzie Manley, MANOLO    Has albuterol MDI as needed for COPD related shortness of breath and/or wheezing.   She has been using her albuterol more regularly daily and has postnasal drainage  Drainage is clear no sinus pain    Also note prior imaging from urology from CT scans does show coronary artery calcifications and aortic iliac  atherosclerosis  Saw her urologist 5-29-24, Dr Nix.  He noted her KUB x-ray was negative no current stones and follow-up 1 year  Was seen by nephrology following up on chronic kidney disease stage IIIa on 7-11-24 and noted her most recent lab with EGFR at 38----also noting her history of acute renal failure in the past from hydronephrosis from renal stones and has required urologic surgery including cystoscopy with stent and lithotripsy in past.  Has postsurgical  hypothyroidism and no history of thyroid cancer   Had follow-up with cardiology Dr. Woods 2-6-24 for history of PSVT.  And noted her history of chronic diastolic CHF.  Blood pressure was noted controlled.  Encouraged her to exercise due to complaint of shortness of breath felt to be deconditioning.  Follow-up 1 to 2 years with cardiology.     Some chronic cough---  She has pnd---onset Dec-----clear drg  Some wheezing  Last urology appointment following up with history renal stones with 7/1/2025--Dr Soares--no stones on KUB    Not much exercise    The following portions of the patient's history were reviewed and updated as appropriate: allergies, current medications, past family history, past medical history, past social history, past surgical history, and problem list.    Review of Systems   Constitutional:  Negative for diaphoresis.   HENT:  Positive for postnasal drip. Negative for nosebleeds and trouble swallowing.    Eyes:  Negative for blurred vision and visual disturbance.   Respiratory:  Positive for shortness of breath and wheezing. Negative for choking.    Gastrointestinal:  Negative for blood in stool.   Allergic/Immunologic: Negative for immunocompromised state.   Neurological:  Negative for facial asymmetry and speech difficulty.   Psychiatric/Behavioral:  Negative for self-injury and suicidal ideas.        Objective   Physical Exam  Vitals and nursing note reviewed.   Constitutional:       General: She is not in acute distress.     Appearance: She is well-developed. She is not toxic-appearing or diaphoretic.   HENT:      Head: Normocephalic and atraumatic. Hair is normal.      Right Ear: External ear normal. No drainage, swelling or tenderness. Tympanic membrane is retracted.      Left Ear: External ear normal. No drainage, swelling or tenderness. Tympanic membrane is retracted.      Nose: Mucosal edema present.      Mouth/Throat:      Mouth: No oral lesions.      Pharynx: Uvula midline. Posterior  oropharyngeal erythema present. No oropharyngeal exudate or uvula swelling.   Eyes:      General: No scleral icterus.        Right eye: No discharge.         Left eye: No discharge.      Conjunctiva/sclera: Conjunctivae normal.      Pupils: Pupils are equal, round, and reactive to light.   Cardiovascular:      Rate and Rhythm: Normal rate and regular rhythm.      Pulses: Normal pulses.      Heart sounds: Normal heart sounds. No murmur heard.     No gallop.   Pulmonary:      Effort: No respiratory distress.      Breath sounds: No stridor. Wheezing present. No rales.   Chest:      Chest wall: No tenderness.   Abdominal:      Palpations: Abdomen is soft.      Tenderness: There is no abdominal tenderness.   Musculoskeletal:      Cervical back: Normal range of motion and neck supple.      Right lower leg: No edema.      Left lower leg: No edema.   Lymphadenopathy:      Cervical: No cervical adenopathy.   Skin:     General: Skin is warm and dry.      Findings: No rash.   Neurological:      Mental Status: She is alert and oriented to person, place, and time.      Motor: No abnormal muscle tone.   Psychiatric:         Mood and Affect: Mood normal.         Behavior: Behavior normal.         Thought Content: Thought content normal.         Judgment: Judgment normal.           Assessment & Plan   Diagnoses and all orders for this visit:    1. Acute bronchitis due to infection (Primary)  -     Comprehensive metabolic panel  -     Lipid panel  -     CBC and Differential  -     TSH  -     T4, Free  -     T3, Free  -     Vitamin D,25-Hydroxy  -     Vitamin B12  -     Folate  -     Magnesium  -     Hemoglobin A1c    2. Chronic obstructive pulmonary disease with acute exacerbation  -     XR Chest PA & Lateral  -     Comprehensive metabolic panel  -     Lipid panel  -     CBC and Differential  -     TSH  -     T4, Free  -     T3, Free  -     Vitamin D,25-Hydroxy  -     Vitamin B12  -     Folate  -     Magnesium  -     Hemoglobin  A1c    3. Primary hypertension  -     Comprehensive metabolic panel  -     Lipid panel  -     CBC and Differential  -     TSH  -     T4, Free  -     T3, Free  -     Vitamin D,25-Hydroxy  -     Vitamin B12  -     Folate  -     Magnesium  -     Hemoglobin A1c    4. Vitamin D deficiency  -     Comprehensive metabolic panel  -     Lipid panel  -     CBC and Differential  -     TSH  -     T4, Free  -     T3, Free  -     Vitamin D,25-Hydroxy  -     Vitamin B12  -     Folate  -     Magnesium  -     Hemoglobin A1c    5. Stage 3a chronic kidney disease  -     Comprehensive metabolic panel  -     Lipid panel  -     CBC and Differential  -     TSH  -     T4, Free  -     T3, Free  -     Vitamin D,25-Hydroxy  -     Vitamin B12  -     Folate  -     Magnesium  -     Hemoglobin A1c    6. Kidney stone  -     Comprehensive metabolic panel  -     Lipid panel  -     CBC and Differential  -     TSH  -     T4, Free  -     T3, Free  -     Vitamin D,25-Hydroxy  -     Vitamin B12  -     Folate  -     Magnesium  -     Hemoglobin A1c    7. Mixed hyperlipidemia  -     Comprehensive metabolic panel  -     Lipid panel  -     CBC and Differential  -     TSH  -     T4, Free  -     T3, Free  -     Vitamin D,25-Hydroxy  -     Vitamin B12  -     Folate  -     Magnesium  -     Hemoglobin A1c    8. PSVT (paroxysmal supraventricular tachycardia)  -     Comprehensive metabolic panel  -     Lipid panel  -     CBC and Differential  -     TSH  -     T4, Free  -     T3, Free  -     Vitamin D,25-Hydroxy  -     Vitamin B12  -     Folate  -     Magnesium  -     Hemoglobin A1c    9. Impaired fasting glucose  -     Comprehensive metabolic panel  -     Lipid panel  -     CBC and Differential  -     TSH  -     T4, Free  -     T3, Free  -     Vitamin D,25-Hydroxy  -     Vitamin B12  -     Folate  -     Magnesium  -     Hemoglobin A1c    10. Low serum vitamin B12  -     Comprehensive metabolic panel  -     Lipid panel  -     CBC and Differential  -     TSH  -     T4,  Free  -     T3, Free  -     Vitamin D,25-Hydroxy  -     Vitamin B12  -     Folate  -     Magnesium  -     Hemoglobin A1c    11. Tobacco dependence in remission  -      CT Chest Low Dose Cancer Screening WO; Future  -     Comprehensive metabolic panel  -     Lipid panel  -     CBC and Differential  -     TSH  -     T4, Free  -     T3, Free  -     Vitamin D,25-Hydroxy  -     Vitamin B12  -     Folate  -     Magnesium  -     Hemoglobin A1c    12. Personal history of nicotine dependence  -      CT Chest Low Dose Cancer Screening WO; Future  -     Comprehensive metabolic panel  -     Lipid panel  -     CBC and Differential  -     TSH  -     T4, Free  -     T3, Free  -     Vitamin D,25-Hydroxy  -     Vitamin B12  -     Folate  -     Magnesium  -     Hemoglobin A1c    13. Post-surgical hypothyroidism    Other orders  -     Budeson-Glycopyrrol-Formoterol (BREZTRI) 160-9-4.8 MCG/ACT aerosol inhaler; Inhale 2 puffs 2 (Two) Times a Day. For COPD and rinse mouth after use  Dispense: 10.7 g; Refill: 11  -     azithromycin (ZITHROMAX) 250 MG tablet; Take 2 tablets the first day, then 1 tablet daily for 4 days for infection  Dispense: 6 tablet; Refill: 1  -     methylPREDNISolone (MEDROL) 4 MG dose pack; follow package directions  Dispense: 21 tablet; Refill: 0          Blood pressure is elevated she has been out of the amlodipine I am restarting it today... Has appointment with nephrology Thursday and we will recheck blood pressure goal was not met  Concerned that she is using her inhaler almost daily the last several months will get a chest x-ray she has had more postnasal drainage and cough some exertional wheezing since December we will treat with Z-Chung and Medrol Dosepak and start Breztri  I will also order low-dose CT of the chest to screen for lung cancer quit smoking 10 years ago has [id number] history  If the inhaler does not help we will need pulmonary function testing  She has appointment with nephrology this Thursday  blood pressure is high today she has been out of amlodipine for the last 2 weeks and restarting it today will make sure blood pressure is back at target range  Sees urology now once a year no renal stones on visit from earlier this month  Will be due to see Dr. Woods cardiology in February history of PSVT and chronic diastolic heart failure  Plan, Lucianolavern Best, was seen today.  she was seen for Hyperlipidemia and will continue current medication, Hypothyroidism and will need to update labs for continued treatment, Vitamin D deficiency and supplemented, and primary hypertension adding back amlodipine today blood pressure to be checked on Thursday with nephrology they also monitor her vitamin D level..  She has chronic kidney disease stage IIIa and has been stable.  On folic acid, D, B12     Declines mammogram and bone density and colon cancer screening  Will update to Prevnar 21 vaccine also recommend Shingrix and RSV vaccine

## 2025-07-14 NOTE — LETTER
Leonard Munozruder  [address] [facility] [address]    July 15, 2025     Dear Ms. Best:    Below are the results from your recent visit:    Resulted Orders   XR Chest PA & Lateral    Narrative    2 VIEW CHEST     HISTORY: Cough and congestion. COPD.     FINDINGS: The lungs are well expanded and clear. There is a moderately  large hiatus hernia measuring 10 cm and appearing slightly larger since  12/2/2020. The heart size is normal.     This report was finalized on 7/14/2025 3:06 PM by Dr. Jignesh Bro M.D  on Workstation: [id number]        Lungs are clear.  You have a very large hiatal hernia.... Your stomach is sitting above your diaphragm.... I you having any heartburn or any trouble with swallowing?  Large-size can also affect your breathing and lungs... Above 7 cm is concerning you are at 10 cm... The thoracic surgeon is the appropriate provider to refer you to.... Is it okay for me to make that referral?     I sent Trelegy to replace Breztri ----if this is $$$and not formulary I need you to find out what is formulary?  I do not have access to the look    If you have any questions or concerns, please don't hesitate to call.         Sincerely,        Felicia Gloria PA-C

## 2025-07-14 NOTE — TELEPHONE ENCOUNTER
I will send Trelegy 100 mg inhaler 1 puff daily and rinse mouth after use and if that is not covered patient needs to find out what is formulary I do not know

## 2025-07-14 NOTE — LETTER
[facility]  Vaccine Consent Form    Patient Name:  Leonard Best  Patient :  1950     Vaccine(s) Ordered    Pneumococcal Conjugate Vaccine 21-Valent All        Screening Checklist  The following questions should be completed prior to vaccination. If you answer “yes” to any question, it does not necessarily mean you should not be vaccinated. It just means we may need to clarify or ask more questions. If a question is unclear, please ask your healthcare provider to explain it.    Yes No   Any fever or moderate to severe illness today (mild illness and/or antibiotic treatment are not contraindications)?     Do you have a history of a serious reaction to any previous vaccinations, such as anaphylaxis, encephalopathy within 7 days, Guillain-Canton syndrome within 6 weeks, seizure?     Have you received any live vaccine(s) (e.g MMR, EDWIGE) or any other vaccines in the last month (to ensure duplicate doses aren't given)?     Do you have an anaphylactic allergy to latex (DTaP, DTaP-IPV, Hep A, Hep B, MenB, RV, Td, Tdap), baker’s yeast (Hep B, HPV), polysorbates (RSV, nirsevimab, PCV 20 and 21, Rotavirrus, Tdap, Shingrix), or gelatin (EDWIGE, MMR)?     Do you have an anaphylactic allergy to neomycin (Rabies, EDWIGE, MMR, IPV, Hep A), polymyxin B (IPV), or streptomycin (IPV)?      Any cancer, leukemia, AIDS, or other immune system disorder? (EDWIGE, MMR, RV)     Do you have a parent, brother, or sister with an immune system problem (if immune competence of vaccine recipient clinically verified, can proceed)? (MMR, EDWIGE)     Any recent steroid treatments for >2 weeks, chemotherapy, or radiation treatment? (EDWIGE, MMR)     Have you received antibody-containing blood transfusions or IVIG in the past 11 months (recommended interval is dependent on product)? (MMR, EDWIGE)     Have you taken antiviral drugs (acyclovir, famciclovir, valacyclovir for EDWIGE) in the last 24 or 48 hours, respectively?      Are you pregnant or planning to  "become pregnant within 1 month? (EDWIGE, MMR, HPV, IPV, MenB, Abrexvy; For Hep B- refer to Engerix-B; For RSV - Abrysvo is indicated for 32-36 weeks of pregnancy from September to January)     For infants, have you ever been told your child has had intussusception or a medical emergency involving obstruction of the intestine (Rotavirus)? If not for an infant, can skip this question.         *Ordering Physicians/APC should be consulted if \"yes\" is checked by the patient or guardian above.  I have received, read, and understand the Vaccine Information Statement (VIS) for each vaccine ordered.  I have considered my or my child's health status as well as the health status of my close contacts.  I have taken the opportunity to discuss my vaccine questions with my or my child's health care provider.   I have requested that the ordered vaccine(s) be given to me or my child.  I understand the benefits and risks of the vaccines.  I understand that I should remain in the clinic for 15 minutes after receiving the vaccine(s).  _________________________________________________________  Signature of Patient or Parent/Legal Guardian ____________________  Date   "

## 2025-07-15 LAB
25(OH)D3+25(OH)D2 SERPL-MCNC: 43.3 NG/ML (ref 30–100)
ALBUMIN SERPL-MCNC: 4.4 G/DL (ref 3.8–4.8)
ALP SERPL-CCNC: 98 IU/L (ref 44–121)
ALT SERPL-CCNC: 13 IU/L (ref 0–32)
AST SERPL-CCNC: 16 IU/L (ref 0–40)
BASOPHILS # BLD AUTO: 0.1 X10E3/UL (ref 0–0.2)
BASOPHILS NFR BLD AUTO: 1 %
BILIRUB SERPL-MCNC: 0.3 MG/DL (ref 0–1.2)
BUN SERPL-MCNC: 24 MG/DL (ref 8–27)
BUN/CREAT SERPL: 19 (ref 12–28)
CALCIUM SERPL-MCNC: 10.2 MG/DL (ref 8.7–10.3)
CHLORIDE SERPL-SCNC: 105 MMOL/L (ref 96–106)
CHOLEST SERPL-MCNC: 174 MG/DL (ref 100–199)
CO2 SERPL-SCNC: 21 MMOL/L (ref 20–29)
CREAT SERPL-MCNC: 1.28 MG/DL (ref 0.57–1)
EGFRCR SERPLBLD CKD-EPI 2021: 44 ML/MIN/1.73
EOSINOPHIL # BLD AUTO: 0.5 X10E3/UL (ref 0–0.4)
EOSINOPHIL NFR BLD AUTO: 6 %
ERYTHROCYTE [DISTWIDTH] IN BLOOD BY AUTOMATED COUNT: 13.6 % (ref 11.7–15.4)
FOLATE SERPL-MCNC: >20 NG/ML
GLOBULIN SER CALC-MCNC: 2.5 G/DL (ref 1.5–4.5)
GLUCOSE SERPL-MCNC: 89 MG/DL (ref 70–99)
HBA1C MFR BLD: 5.5 % (ref 4.8–5.6)
HCT VFR BLD AUTO: 48.1 % (ref 34–46.6)
HDLC SERPL-MCNC: 67 MG/DL
HGB BLD-MCNC: 15.4 G/DL (ref 11.1–15.9)
IMM GRANULOCYTES # BLD AUTO: 0 X10E3/UL (ref 0–0.1)
IMM GRANULOCYTES NFR BLD AUTO: 0 %
LDLC SERPL CALC-MCNC: 85 MG/DL (ref 0–99)
LYMPHOCYTES # BLD AUTO: 2.5 X10E3/UL (ref 0.7–3.1)
LYMPHOCYTES NFR BLD AUTO: 26 %
MAGNESIUM SERPL-MCNC: 2.1 MG/DL (ref 1.6–2.3)
MCH RBC QN AUTO: 33 PG (ref 26.6–33)
MCHC RBC AUTO-ENTMCNC: 32 G/DL (ref 31.5–35.7)
MCV RBC AUTO: 103 FL (ref 79–97)
MONOCYTES # BLD AUTO: 0.7 X10E3/UL (ref 0.1–0.9)
MONOCYTES NFR BLD AUTO: 7 %
NEUTROPHILS # BLD AUTO: 6 X10E3/UL (ref 1.4–7)
NEUTROPHILS NFR BLD AUTO: 60 %
PLATELET # BLD AUTO: 233 X10E3/UL (ref 150–450)
POTASSIUM SERPL-SCNC: 4.8 MMOL/L (ref 3.5–5.2)
PROT SERPL-MCNC: 6.9 G/DL (ref 6–8.5)
RBC # BLD AUTO: 4.66 X10E6/UL (ref 3.77–5.28)
SODIUM SERPL-SCNC: 142 MMOL/L (ref 134–144)
T3FREE SERPL-MCNC: 2.7 PG/ML (ref 2–4.4)
T4 FREE SERPL-MCNC: 1.14 NG/DL (ref 0.82–1.77)
TRIGL SERPL-MCNC: 129 MG/DL (ref 0–149)
TSH SERPL DL<=0.005 MIU/L-ACNC: 1.14 UIU/ML (ref 0.45–4.5)
VIT B12 SERPL-MCNC: 1473 PG/ML (ref 232–1245)
VLDLC SERPL CALC-MCNC: 22 MG/DL (ref 5–40)
WBC # BLD AUTO: 9.9 X10E3/UL (ref 3.4–10.8)

## 2025-07-15 NOTE — TELEPHONE ENCOUNTER
"Called and spoke with patient to inform:    \"I will send Trelegy 100 mg inhaler 1 puff daily and rinse mouth after use and if that is not covered patient needs to find out what is formulary I do not know\"    Patient verbalized understanding  "

## 2025-08-03 ENCOUNTER — HOSPITAL ENCOUNTER (OUTPATIENT)
Facility: HOSPITAL | Age: 75
Discharge: HOME | End: 2025-08-03
Admitting: PHYSICIAN ASSISTANT
Payer: MEDICARE

## 2025-08-03 DIAGNOSIS — Z87.891 PERSONAL HISTORY OF NICOTINE DEPENDENCE: ICD-10-CM

## 2025-08-03 DIAGNOSIS — F17.201 TOBACCO DEPENDENCE IN REMISSION: ICD-10-CM

## 2025-08-03 PROCEDURE — 71271 CT THORAX LUNG CANCER SCR C-: CPT
